# Patient Record
Sex: MALE | Race: WHITE | NOT HISPANIC OR LATINO | URBAN - METROPOLITAN AREA
[De-identification: names, ages, dates, MRNs, and addresses within clinical notes are randomized per-mention and may not be internally consistent; named-entity substitution may affect disease eponyms.]

---

## 2018-03-01 RX ORDER — METOPROLOL SUCCINATE 50 MG/1
50 TABLET, EXTENDED RELEASE ORAL NIGHTLY
COMMUNITY
Start: 2017-12-20

## 2018-03-01 RX ORDER — BUMETANIDE 0.5 MG/1
0.5 TABLET ORAL EVERY MORNING
COMMUNITY
Start: 2017-12-20 | End: 2025-03-11

## 2018-03-01 RX ORDER — LOPERAMIDE HCL 2 MG
2 TABLET ORAL AS NEEDED
COMMUNITY
Start: 2017-05-16 | End: 2019-12-17 | Stop reason: ALTCHOICE

## 2018-03-01 RX ORDER — IPRATROPIUM BROMIDE 21 UG/1
0.03 SPRAY, METERED NASAL AS NEEDED
COMMUNITY
Start: 2014-05-20

## 2018-03-01 RX ORDER — EZETIMIBE 10 MG/1
10 TABLET ORAL DAILY
COMMUNITY
Start: 2017-12-20 | End: 2018-11-26 | Stop reason: SDUPTHER

## 2018-03-01 RX ORDER — METFORMIN HYDROCHLORIDE 500 MG/1
500 TABLET ORAL 2 TIMES DAILY
COMMUNITY
Start: 2017-12-20 | End: 2018-04-16 | Stop reason: SDUPTHER

## 2018-03-01 RX ORDER — SILODOSIN 8 MG/1
8 CAPSULE ORAL NIGHTLY
COMMUNITY
Start: 2017-12-20

## 2018-03-01 RX ORDER — MUPIROCIN 20 MG/G
2 OINTMENT TOPICAL AS NEEDED
COMMUNITY
Start: 2017-03-21 | End: 2019-12-17 | Stop reason: ALTCHOICE

## 2018-03-01 RX ORDER — CHOLECALCIFEROL (VITAMIN D3) 25 MCG
1000 TABLET ORAL DAILY
COMMUNITY
Start: 2017-05-31

## 2018-03-01 RX ORDER — LISINOPRIL 10 MG/1
10 TABLET ORAL DAILY
COMMUNITY
Start: 2017-12-20 | End: 2019-02-11 | Stop reason: SDUPTHER

## 2018-03-01 RX ORDER — POTASSIUM CHLORIDE 750 MG/1
10 CAPSULE, EXTENDED RELEASE ORAL EVERY OTHER DAY
COMMUNITY
Start: 2017-12-20 | End: 2024-04-02 | Stop reason: ALTCHOICE

## 2018-03-20 ENCOUNTER — OFFICE VISIT (OUTPATIENT)
Dept: CARDIOLOGY | Facility: CLINIC | Age: 83
End: 2018-03-20
Attending: INTERNAL MEDICINE
Payer: MEDICARE

## 2018-03-20 VITALS
HEIGHT: 74 IN | DIASTOLIC BLOOD PRESSURE: 66 MMHG | SYSTOLIC BLOOD PRESSURE: 136 MMHG | WEIGHT: 220 LBS | BODY MASS INDEX: 28.23 KG/M2

## 2018-03-20 DIAGNOSIS — I48.19 PERSISTENT ATRIAL FIBRILLATION (CMS/HCC): Primary | ICD-10-CM

## 2018-03-20 DIAGNOSIS — I25.10 CORONARY ARTERY DISEASE INVOLVING NATIVE CORONARY ARTERY WITHOUT ANGINA PECTORIS, UNSPECIFIED WHETHER NATIVE OR TRANSPLANTED HEART: ICD-10-CM

## 2018-03-20 DIAGNOSIS — E78.5 DYSLIPIDEMIA: ICD-10-CM

## 2018-03-20 PROCEDURE — 99214 OFFICE O/P EST MOD 30 MIN: CPT | Performed by: INTERNAL MEDICINE

## 2018-03-20 RX ORDER — TAMSULOSIN HYDROCHLORIDE 0.4 MG/1
0.4 CAPSULE ORAL DAILY
COMMUNITY
End: 2019-07-12

## 2018-03-20 RX ORDER — FERROUS SULFATE 325(65) MG
65 TABLET ORAL EVERY OTHER DAY
COMMUNITY

## 2018-03-20 NOTE — PROGRESS NOTES
I had the privilege of seeing your patient today for his multiple cardio metabolic problems which are reviewed in previous correspondence most specifically my December 20 letter.    Since that visit Mr. Pruitt has done well and expresses no change in his status he become short of breath if he goes too quickly but at rest she is comfortable with no chest pain PND orthopnea dizziness syncope or edema.  His weight has been stable and he has not fallen although he is certainly at risk.  Pacemaker is in place and is being followed.    The past family social history reviewed in the EMR and in previous correspondence.    Laboratory values from December the creatinine is stable at 1.4, potassium 5.0 hemoglobin 12.1.    Lipid profile total cholesterol 183 LDL 91 HDL 42 triglycerides 248    Physical examination elderly gentleman he was in no distress walking carefully with his cane the blood pressure is 136/66 the pulse is regular rate and rhythm at 70 the weight is 220 which is stable eyes are normal no JVD carotid pulses normal upstroke no bruits lungs are clear to auscultation and percussion there is a grade 3 systolic murmur at the base radiating to the neck abdomen nontender extremities show trace edema skin is warm and dry with multiple sun damaged areas.  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

## 2018-03-20 NOTE — LETTER
March 20, 2018     Aguilar Phan [place]  [place] [id number]      Patient: Cain Pruitt   YOB: 1932   Date of Visit: 3/20/2018       Dear Dr. Argueta Recipients:    Thank you for referring Cain Pruitt to me for evaluation. Below are my notes for this consultation.    If you have questions, please do not hesitate to call me. I look forward to following your patient along with you.         Sincerely,        Chandrakant Bahena MD        CC: No Recipients  Chandrakant Bahena MD  3/20/2018  2:49 PM  Sign at close encounter  I had the privilege of seeing your patient today for his multiple cardio metabolic problems which are reviewed in previous correspondence most specifically my December 20 letter.    Since that visit Mr. Pruitt has done well and expresses no change in his status he become short of breath if he goes too quickly but at rest she is comfortable with no chest pain PND orthopnea dizziness syncope or edema.  His weight has been stable and he has not fallen although he is certainly at risk.  Pacemaker is in place and is being followed.    The past family social history reviewed in the EMR and in previous correspondence.    Laboratory values from December the creatinine is stable at 1.4, potassium 5.0 hemoglobin 12.1.    Lipid profile total cholesterol 183 LDL 91 HDL 42 triglycerides 248    Physical examination elderly gentleman he was in no distress walking carefully with his cane the blood pressure is 136/66 the pulse is regular rate and rhythm at 70 the weight is 220 which is stable eyes are normal no JVD carotid pulses normal upstroke no bruits lungs are clear to auscultation and percussion there is a grade 3 systolic murmur at the base radiating to the neck abdomen nontender extremities show trace edema skin is warm and dry with multiple sun damaged areas.  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 Coronary heart disease with previous dyspnea and angina with bypass surgery, Dr. Lei, , four way including two mammaries with a small non-Q wave myocardial infarction, 2011, with GI bleeding and cardiac catheterization showing patent bypasses and stents.  2. Chronic left ventricular systolic dysfunction with septal moderate global hypokinesis.  3. Hypertension.  4. Hyperlipidemia with statin intolerance because of myalgias, on Zetia.  5. Abdominal obesity.  6. Erectile dysfunction with failure to respond to PDE-5 inhibition.  7. Calcific aortic stenosis, echocardiogram, moderate to severe, 2016.  8. Family history of stroke, brother, late onset.  9. New onset of effort angina, 2007, cardiac cath catheterization demonstrating stenosis involving two sites of a vein graft to a small circumflex marginal with patent mammary grafts to LAD and distal right coronary artery with distal coronary artery stenoses, treated with three bare metal stents in vein graft, Dr. Gutierrez.  10. Carotid atherosclerosis, moderate, bilateral, asymptomatic.  11. Permanent atrial fibrillation, onset 2000, with initial cardioversion 2007  with early recurrence with episode of bradycardia during GI bleeding.  12. Chronic anticoagulation management with Eliquis.  13. 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  14. Syncope, 2008, 2011, with bradyarrhythmia and pacemaker placed, Dr. Meyer, September 2013.  15. Possible obstructive sleep apnea with history of snoring and daytime somnolence with reportedly negative workup.  16. Possible claudication but with normal EDEL's.  17. Peripheral neuropathy.  18. Tobacco use history - never.  19. History of orthostatic hypotension.  20. Vitamin-D deficiency.  21. Weakness and collapse secondary to GI bleeding, November 2011, and episode of syncope, November 2016, possibly secondary to dehydration or his aortic stenosis.  22. 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 Sher Denis, complicated by postoperative bleeding, requiring reoperation for control.  Postoperative bronchitis noted, treated with antibiotic.  23. Colonic polyps.  24. History of upper GI bleeding with AV malformations causing melena secondary to severe bleeding, June 2016, requiring GI intervention endoscopically.  25. Edema secondary to chronic diastolic heart failure, worsening in October 2016, hospitalized for decompensated CHF.  26. Osteomyelitis of the right great toe, requiring amputation for non-healing, 2017.  27. BPH symptoms requiring self-catheterization.    Current Medications:  Lisinopril 10 mg daily, Eliquis 5 mg b.i.d., Zetia 10 mg, metformin 500 mg once daily, vitamin-D3 2000 units daily, metoprolol succinate ER 50 mg once daily, Bumex 0.5 mg once daily, Rapaflo 8 mg daily.    History:  Since his last visit of May, Mr. Pruitt states that he is feeling somewhat more short of breath with ordinary activity.  He also has some tightness in his upper chest when he overdoes it.  At rest and with low levels of activity, he is comfortable.  He has complex valvular and  "coronary disease as described above and underwent an echo today.    Current Lab Studies:  The total cholesterol is 187, LDL 96, HDL 44, triglycerides 236.  Creatinine is 1.6, potassium 4.9, fasting sugar 146, A1C 6.8.  Liver function tests are normal.  Ferritin level is low at 28.  Hemoglobin is 12.2 with normal red blood cell indices.    Past, family and social histories are reviewed and updated.    Physical Examination:  Overweight, no distress.  The blood pressure is 130/66.  The pulse is 48 and regular.  The weight is 221, down eight pounds, at 6'2\".  The eyes are normal.  There is no jugular venous distention.  The carotid pulses are sclerotic with a normal upstroke and no bruits.  Lungs are clear.  LV apical impulse is indistinct.  Heart sounds are distant, Grade 3 systolic murmur at the base without radiation. Abdomen is obese, nontender, no masses or organomegaly.  Extremities show trace edema at each ankle.  There is a boot on his right foot.    Echocardiogram was supervised and interpreted with the patient and family.  This demonstrates mild decreased LV systolic function, EF in the 45-50% range, with distal septal hypokinesis.  The left atrium is mildly dilated to 4.8.  There is mild left ventricular hypertrophy, mitral annular calcification.  The aortic valve is calcified and reduced mobility.  The peak systolic gradient is 64 with a mean gradient is 34 and the calculated valve area is 0.7 cm sq.  Moderate aortic regurgitation is also present.    Assessment and Plan:    1. 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    2. Chronic left ventricular systolic and diastolic dysfunction with mildly reduced overall systolic function.    3. Hypertension.  Controlled.    4. Hyperlipidemia.  Relatively well controlled with some difficulty with statin therapy.    5. Calcific aortic stenosis which is now severe and mildly symptomatic.  We have discussed the timing of intervention.  At this point, we have " decided to defer pending worsening symptoms given his other comorbidities.    6. Carotid atherosclerosis which in the past was moderate, bilateral.  Currently asymptomatic.    7. History of atrial fibrillation.    8. Anticoagulation with Eliquis with no bleeding.    9. History of syncope with a pacemaker in place.    10. Possible obstructive sleep apnea.    11. Chronic kidney disease, Stage 3.  Creatinine is stable at 1.6.    12. History of GI bleeding in the past.  Hemoglobin is now normal at 12.2.  Ferritin level is low, suggesting ongoing iron deficiency.    13. Adenocarcinoma of the cecum.  That has been removed and with no recurrence.    14. 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  No recurrence.    15. BPH symptoms, requiring self-catheterization.  Ordinarily, he would be having surgery for this at this point but with his other problems, we have elected to see how Mr. Pruitt does over the next month or two and decide whether intervention on his valve would be wise at this point or not.    cc:

## 2018-03-20 NOTE — PROGRESS NOTES
2017    Re:  STEPHANI CHRISTIANSON  :  1932    1. Coronary heart disease with previous dyspnea and angina with bypass surgery, Dr. Lei, , four way including two mammaries with a small non-Q wave myocardial infarction, 2011, with GI bleeding and cardiac catheterization showing patent bypasses and stents.  2. Chronic left ventricular systolic dysfunction with septal moderate global hypokinesis.  3. Hypertension.  4. Hyperlipidemia with statin intolerance because of myalgias, on Zetia.  5. Abdominal obesity.  6. Erectile dysfunction with failure to respond to PDE-5 inhibition.  7. Calcific aortic stenosis, echocardiogram, moderate to severe, 2016.  8. Family history of stroke, brother, late onset.  9. New onset of effort angina, 2007, cardiac cath catheterization demonstrating stenosis involving two sites of a vein graft to a small circumflex marginal with patent mammary grafts to LAD and distal right coronary artery with distal coronary artery stenoses, treated with three bare metal stents in vein graft, Dr. Gutierrez.  10. Carotid atherosclerosis, moderate, bilateral, asymptomatic.  11. Permanent atrial fibrillation, onset 2000, with initial cardioversion 2007 with early recurrence with episode of bradycardia during GI bleeding.  12. Chronic anticoagulation management with Eliquis.  13. 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  14. Syncope, 2011, with bradyarrhythmia and pacemaker placed, Dr. Meyer, 2013.  15. Possible obstructive sleep apnea with history of snoring and daytime somnolence with reportedly negative workup.  16. Possible claudication but with normal EDEL's.  17. Peripheral neuropathy.  18. Tobacco use history - never.  19. History of orthostatic hypotension.  20. Vitamin-D deficiency.  21. Weakness and collapse secondary to GI bleeding, 2011, and episode of syncope, 2016,  "possibly secondary to dehydration or his aortic stenosis.  22. 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 Sher Denis, complicated by postoperative bleeding, requiring reoperation for control.  Postoperative bronchitis noted, treated with antibiotic.  23. Colonic polyps.  24. History of upper GI bleeding with AV malformations causing melena secondary to severe bleeding, June 2016, requiring GI intervention endoscopically.  25. Edema secondary to chronic diastolic heart failure, worsening in October 2016, hospitalized for decompensated CHF.  26. Osteomyelitis of the right great toe, requiring amputation for non-healing, 2017.  27. BPH symptoms requiring self-catheterization.    Current Medications:  Lisinopril 10 mg daily, Eliquis 5 mg b.i.d., Zetia 10 mg, metformin 500 mg once daily, vitamin-D3 2000 units daily, metoprolol succinate ER 50 mg once daily, Bumex 0.5 mg once daily, Rapaflo 8 mg daily.    History:  Since his last visit of May, Mr. Pruitt states that he is feeling somewhat more short of breath with ordinary activity.  He also has some tightness in his upper chest when he overdoes it.  At rest and with low levels of activity, he is comfortable.  He has complex valvular and coronary disease as described above and underwent an echo today.    Current Lab Studies:  The total cholesterol is 187, LDL 96, HDL 44, triglycerides 236.  Creatinine is 1.6, potassium 4.9, fasting sugar 146, A1C 6.8.  Liver function tests are normal.  Ferritin level is low at 28.  Hemoglobin is 12.2 with normal red blood cell indices.    Past, family and social histories are reviewed and updated.    Physical Examination:  Overweight, no distress.  The blood pressure is 130/66.  The pulse is 48 and regular.  The weight is 221, down eight pounds, at 6'2\".  The eyes are normal.  There is no jugular venous distention.  The carotid pulses are sclerotic with a normal upstroke and no bruits.  Lungs are " clear.  LV apical impulse is indistinct.  Heart sounds are distant, Grade 3 systolic murmur at the base without radiation. Abdomen is obese, nontender, no masses or organomegaly.  Extremities show trace edema at each ankle.  There is a boot on his right foot.    Echocardiogram was supervised and interpreted with the patient and family.  This demonstrates mild decreased LV systolic function, EF in the 45-50% range, with distal septal hypokinesis.  The left atrium is mildly dilated to 4.8.  There is mild left ventricular hypertrophy, mitral annular calcification.  The aortic valve is calcified and reduced mobility.  The peak systolic gradient is 64 with a mean gradient is 34 and the calculated valve area is 0.7 cm sq.  Moderate aortic regurgitation is also present.    Assessment and Plan:    1. 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    2. Chronic left ventricular systolic and diastolic dysfunction with mildly reduced overall systolic function.    3. Hypertension.  Controlled.    4. Hyperlipidemia.  Relatively well controlled with some difficulty with statin therapy.    5. Calcific aortic stenosis which is now severe and mildly symptomatic.  We have discussed the timing of intervention.  At this point, we have decided to defer pending worsening symptoms given his other comorbidities.    6. Carotid atherosclerosis which in the past was moderate, bilateral.  Currently asymptomatic.    7. History of atrial fibrillation.    8. Anticoagulation with Eliquis with no bleeding.    9. History of syncope with a pacemaker in place.    10. Possible obstructive sleep apnea.    11. Chronic kidney disease, Stage 3.  Creatinine is stable at 1.6.    12. History of GI bleeding in the past.  Hemoglobin is now normal at 12.2.  Ferritin level is low, suggesting ongoing iron deficiency.    13. Adenocarcinoma of the cecum.  That has been removed and with no recurrence.    14. 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  No recurrence.    15. BPH symptoms, requiring self-catheterization.  Ordinarily, he would be having surgery for this at this point but with his other problems, we have elected to see how Mr. Pruitt does over the next month or two and decide whether intervention on his valve would be wise at this point or not.    cc:

## 2018-04-13 ENCOUNTER — TELEPHONE (OUTPATIENT)
Dept: CARDIOLOGY | Facility: CLINIC | Age: 83
End: 2018-04-13

## 2018-04-16 RX ORDER — METFORMIN HYDROCHLORIDE 500 MG/1
500 TABLET ORAL 2 TIMES DAILY
Qty: 180 TABLET | Refills: 3 | Status: SHIPPED | OUTPATIENT
Start: 2018-04-16 | End: 2019-10-14

## 2018-06-18 ENCOUNTER — OFFICE VISIT (OUTPATIENT)
Dept: CARDIOLOGY | Facility: CLINIC | Age: 83
End: 2018-06-18
Payer: MEDICARE

## 2018-06-18 VITALS
BODY MASS INDEX: 28.04 KG/M2 | SYSTOLIC BLOOD PRESSURE: 140 MMHG | DIASTOLIC BLOOD PRESSURE: 72 MMHG | WEIGHT: 218.5 LBS | HEIGHT: 74 IN

## 2018-06-18 DIAGNOSIS — I48.21 PERMANENT ATRIAL FIBRILLATION (CMS/HCC): Primary | ICD-10-CM

## 2018-06-18 PROCEDURE — 99214 OFFICE O/P EST MOD 30 MIN: CPT | Performed by: INTERNAL MEDICINE

## 2018-06-18 RX ORDER — DUTASTERIDE 0.5 MG/1
0.5 CAPSULE, LIQUID FILLED ORAL NIGHTLY
Refills: 0 | COMMUNITY
Start: 2018-05-15

## 2018-06-18 NOTE — LETTER
2018     Aguilar Phan MD  [address] [facility] [place] [address]    Patient: Stephani Pruitt   YOB: 1932   Date of Visit: 2018       Dear Dr. Phan:    Thank you for referring Stephani Pruitt to me for evaluation. Below are my notes for this consultation.    If you have questions, please do not hesitate to call me. I look forward to following your patient along with you.         Sincerely,        Chandrakant Bahena MD        CC: No Recipients  Chandrakant Bahena MD  2018  3:08 PM  Sign at close encounter                 Chandrakant Bahena MD  2018  3:18 PM  Sign at close encounter  Patient MRN: [id number]  Date: 2017  Description: Prog Notes ([facility]) Cardiology  Category: Progress Notes ([facility])  Provider ID: [id number]  Practice ID: [id number]  [place] ID: [id number]  2017  Aguilar Phan M.D.  [address] [place] [address]  Re: STEPHNAI PRUITT  : 1932  Dear Dr. Phan:  I saw Mr. Pruitt in follow-up for his chronic medical problems includin. Coronary heart disease with previous dyspnea and angina with bypass surgery, Dr. Lei,  , four way including two mammaries with a small non-Q wave myocardial infarction,  2011, with GI bleeding and cardiac catheterization showing patent bypasses and  stents.  2. Chronic left ventricular systolic dysfunction with septal moderate global hypokinesis.  3. Hypertension.  4. Hyperlipidemia with statin intolerance because of myalgias, on Zetia.  5. Abdominal obesity.  6. Erectile dysfunction with failure to respond to PDE-5 inhibition.  7. Calcific aortic stenosis, echocardiogram, moderate to severe, 2016.  8. Family history of stroke, brother, late onset.  9. New onset of effort angina, 2007, cardiac cath catheterization demonstrating stenosis  involving two sites of a vein graft to a small circumflex marginal with patent mammary grafts to  LAD and distal  right coronary artery with distal coronary artery stenoses, treated with three bare  metal stents in vein graft, Dr. Gutierrez.  10. Carotid atherosclerosis, moderate, bilateral, asymptomatic.  11. Permanent atrial fibrillation, onset October 2000, with initial cardioversion December 2007  with early recurrence with episode of bradycardia during GI bleeding.  12. Chronic anticoagulation management with Eliquis.  13. 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  14. Syncope, 2008, 2011, with bradyarrhythmia and pacemaker placed, Dr. Meyer, September 2013.  15. Possible obstructive sleep apnea with history of snoring and daytime somnolence with  reportedly negative workup.  16. Possible claudication but with normal EDEL's.  17. Peripheral neuropathy.  18. Tobacco use history - never.  19. History of orthostatic hypotension.  20. Vitamin-D deficiency.  21. Weakness and collapse secondary to GI bleeding, November 2011, and episode of syncope,  November 2016, possibly secondary to dehydration or his aortic stenosis.  22. 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 Sher Denis, complicated by postoperative bleeding, requiring reoperation for control. Postoperative  bronchitis noted, treated with antibiotic.  23. Colonic polyps.  24. History of upper GI bleeding with AV malformations causing melena secondary to severe  bleeding, June 2016, requiring GI intervention endoscopically.  25. Edema secondary to chronic diastolic heart failure, worsening in October 2016, hospitalized for  decompensated CHF.  26. Osteomyelitis of the right great toe, requiring amputation for non-healing, 2017.  27. BPH symptoms requiring self-catheterization.    Current Medications: Lisinopril 10 mg daily, Eliquis 5 mg b.i.d., Zetia 10 mg, metformin 500 mg once  daily, vitamin-D3 2000 units daily, metoprolol succinate ER 50 mg once daily, Bumex 0.5 mg once daily,  Rapaflo 8 mg  daily    Since his last visit of March 20 he reports doing well is no longer short of breath not edematous weight is stable no chest pain PND orthopnea palpitations or bleeding.    His multiple medical problems are reviewed above and in the EMR    Physical examination walking with a cane mildly overweight no distress blood pressure 140/70 pulse 70 and regular weight 218 down 1 pound eyes are normal no JVD lungs are clear grade 3 systolic murmur at the base radiating to the neck extremities with stasis changes and a slight shallow ulceration at the right ankle.  No edema.  Balance and gait are supported adequately with a cane    Assessment and plan    1 chronic ischemic heart disease asymptomatic on medical therapy.  Continue    2 chronic left ventricular systolic dysfunction CHF is compensated continue present medication.  Currently he is euvolemic    3 hyperlipidemia difficulty with high intensity statin last LDL 91    4 permanent atrial fibrillation asymptomatic with history of bradycardia arrhythmia and pacemaker in place    5 anticoagulation with Eliquis with good compliance and no bleeding.  I told him there is a reversal agent available and trauma centers if necessary.    6 edema which is resolved there is a very slight skin breakdown at his right ankle and he is treating this with local topical medications and dressings that should be watched.

## 2018-06-18 NOTE — PROGRESS NOTES
Patient MRN: [id number]  Date: 2017  Description: Prog Notes ([facility]) Cardiology  Category: Progress Notes ([facility])  Provider ID: [id number]  Practice ID: [id number]  [place] ID: [id number]  2017  Aguilar Phan M.D.  [address] [place] [address]  Re: STEPHANI PRUITT  : 1932  Dear Dr. Phan:  I saw Mr. Pruitt in follow-up for his chronic medical problems includin. Coronary heart disease with previous dyspnea and angina with bypass surgery, Dr. Lei,  , four way including two mammaries with a small non-Q wave myocardial infarction,  2011, with GI bleeding and cardiac catheterization showing patent bypasses and  stents.  2. Chronic left ventricular systolic dysfunction with septal moderate global hypokinesis.  3. Hypertension.  4. Hyperlipidemia with statin intolerance because of myalgias, on Zetia.  5. Abdominal obesity.  6. Erectile dysfunction with failure to respond to PDE-5 inhibition.  7. Calcific aortic stenosis, echocardiogram, moderate to severe, 2016.  8. Family history of stroke, brother, late onset.  9. New onset of effort angina, 2007, cardiac cath catheterization demonstrating stenosis  involving two sites of a vein graft to a small circumflex marginal with patent mammary grafts to  LAD and distal right coronary artery with distal coronary artery stenoses, treated with three bare  metal stents in vein graft, Dr. Gutierrez.  10. Carotid atherosclerosis, moderate, bilateral, asymptomatic.  11. Permanent atrial fibrillation, onset 2000, with initial cardioversion 2007  with early recurrence with episode of bradycardia during GI bleeding.  12. Chronic anticoagulation management with Eliquis.  13. 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  14. Syncope, , , with bradyarrhythmia and pacemaker placed, Dr. Meyer, 2013.  15. Possible obstructive sleep apnea with history  of snoring and daytime somnolence with  reportedly negative workup.  16. Possible claudication but with normal EDEL's.  17. Peripheral neuropathy.  18. Tobacco use history - never.  19. History of orthostatic hypotension.  20. Vitamin-D deficiency.  21. Weakness and collapse secondary to GI bleeding, November 2011, and episode of syncope,  November 2016, possibly secondary to dehydration or his aortic stenosis.  22. 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 Sher Denis, complicated by postoperative bleeding, requiring reoperation for control. Postoperative  bronchitis noted, treated with antibiotic.  23. Colonic polyps.  24. History of upper GI bleeding with AV malformations causing melena secondary to severe  bleeding, June 2016, requiring GI intervention endoscopically.  25. Edema secondary to chronic diastolic heart failure, worsening in October 2016, hospitalized for  decompensated CHF.  26. Osteomyelitis of the right great toe, requiring amputation for non-healing, 2017.  27. BPH symptoms requiring self-catheterization.    Current Medications: Lisinopril 10 mg daily, Eliquis 5 mg b.i.d., Zetia 10 mg, metformin 500 mg once  daily, vitamin-D3 2000 units daily, metoprolol succinate ER 50 mg once daily, Bumex 0.5 mg once daily,  Rapaflo 8 mg daily    Since his last visit of March 20 he reports doing well is no longer short of breath not edematous weight is stable no chest pain PND orthopnea palpitations or bleeding.    His multiple medical problems are reviewed above and in the EMR    Physical examination walking with a cane mildly overweight no distress blood pressure 140/70 pulse 70 and regular weight 218 down 1 pound eyes are normal no JVD lungs are clear grade 3 systolic murmur at the base radiating to the neck extremities with stasis changes and a slight shallow ulceration at the right ankle.  No edema.  Balance and gait are supported adequately with a cane    Recent  lab studies total cholesterol 186 triglycerides 217 HDL 40  urine AC ratio 92 normal 30.  Hemoglobin 13.3 creatinine 1.3 potassium 4.8 glucose 151 liver function test normal.  A1c 6.3    Assessment and plan    1 chronic ischemic heart disease asymptomatic on medical therapy.  Continue    2 chronic left ventricular systolic dysfunction CHF is compensated continue present medication.  Currently he is euvolemic    3 hyperlipidemia difficulty with high intensity statin.  Lipids are under fair control triglycerides a bit elevated for which hygienic lifestyle continues to be encouraged    4 permanent atrial fibrillation asymptomatic with history of bradycardia arrhythmia and pacemaker in place    5 anticoagulation with Eliquis with good compliance and no bleeding.  I told him there is a reversal agent available and trauma centers if necessary.    6 edema which is resolved there is a very slight skin breakdown at his right ankle and he is treating this with local topical medications and dressings that should be watched.    7 type 2 diabetes A1c relatively well controlled with no episodes of hypoglycemia.

## 2018-11-14 ENCOUNTER — OFFICE VISIT (OUTPATIENT)
Dept: CARDIOLOGY | Facility: CLINIC | Age: 83
End: 2018-11-14
Attending: INTERNAL MEDICINE
Payer: MEDICARE

## 2018-11-14 VITALS
WEIGHT: 225 LBS | DIASTOLIC BLOOD PRESSURE: 66 MMHG | SYSTOLIC BLOOD PRESSURE: 126 MMHG | HEIGHT: 74 IN | BODY MASS INDEX: 28.88 KG/M2

## 2018-11-14 DIAGNOSIS — I25.10 CORONARY ARTERY DISEASE INVOLVING NATIVE CORONARY ARTERY OF NATIVE HEART WITHOUT ANGINA PECTORIS: ICD-10-CM

## 2018-11-14 DIAGNOSIS — I10 ESSENTIAL HYPERTENSION: ICD-10-CM

## 2018-11-14 DIAGNOSIS — I35.0 NONRHEUMATIC AORTIC VALVE STENOSIS: Primary | ICD-10-CM

## 2018-11-14 DIAGNOSIS — I48.20 CHRONIC ATRIAL FIBRILLATION (CMS/HCC): ICD-10-CM

## 2018-11-14 DIAGNOSIS — E78.5 DYSLIPIDEMIA: ICD-10-CM

## 2018-11-14 PROCEDURE — 93000 ELECTROCARDIOGRAM COMPLETE: CPT | Performed by: INTERNAL MEDICINE

## 2018-11-14 PROCEDURE — 99215 OFFICE O/P EST HI 40 MIN: CPT | Performed by: INTERNAL MEDICINE

## 2018-11-14 RX ORDER — LANOLIN ALCOHOL/MO/W.PET/CERES
1000 CREAM (GRAM) TOPICAL DAILY
COMMUNITY

## 2018-11-14 NOTE — PROGRESS NOTES
1. Coronary heart disease with previous dyspnea and angina with bypass surgery, Dr. Lei,  1991, four way including two mammaries with a small non-Q wave myocardial infarction,  November 2011, with GI bleeding and cardiac catheterization showing patent bypasses and  stents.  2. Chronic left ventricular systolic dysfunction with septal moderate global hypokinesis.  3. Hypertension.  4. Hyperlipidemia with statin intolerance because of myalgias, on ezetimibe  5. Abdominal obesity.  6. Erectile dysfunction with failure to respond to PDE-5 inhibition.  7. Calcific aortic stenosis, echocardiogram, moderate to severe, November 2016.  8. Family history of stroke, brother, late onset.  9. New onset of effort angina, October 2007, cardiac cath catheterization demonstrating stenosis  involving two sites of a vein graft to a small circumflex marginal with patent mammary grafts to  LAD and distal right coronary artery with distal coronary artery stenoses, treated with three bare  metal stents in vein graft, Dr. Gutierrez.  10. Carotid atherosclerosis, moderate, bilateral, asymptomatic.  11. Permanent atrial fibrillation, onset October 2000, with initial cardioversion December 2007  with early recurrence with episode of bradycardia during GI bleeding.  12. Chronic anticoagulation management with Eliquis.  13. 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 Syncope, 2008, 2011, with bradyarrhythmia and pacemaker placed, Dr. Meyer, September 2013.  15. Possible obstructive sleep apnea with history of snoring and daytime somnolence with  reportedly negative workup.  16. Possible claudication but with normal EDEL's.  17. Peripheral neuropathy.  With paresthesias  18. Tobacco use history - never.  19. History of orthostatic hypotension.  20. Vitamin-D deficiency.  21. Weakness and collapse secondary to GI bleeding, November 2011, and episode of syncope,  November 2016, possibly secondary to dehydration or his aortic  stenosis.  22. 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 Sher Denis, complicated by postoperative bleeding, requiring reoperation for control. Postoperative  bronchitis noted, treated with antibiotic.  23. Colonic polyps.  24. History of upper GI bleeding with AV malformations causing melena secondary to severe  bleeding, June 2016, requiring GI intervention endoscopically.  25. Edema secondary to chronic diastolic heart failure, worsening in October 2016, hospitalized for  decompensated CHF.  26. Osteomyelitis of the right great toe, requiring amputation for non-healing, 2017.  27. BPH symptoms requiring self-catheterization.     Current Medications: Lisinopril 10 mg daily, Eliquis 5 mg b.i.d., Zetia 10 mg, metformin 500 mg once  daily, vitamin-D3 2000 units daily, metoprolol succinate ER 50 mg once daily, Bumex 0.5 mg once daily,  Rapaflo 8 mg daily    Interim history: Since his last visit of June your patient reports being about the same and states he feels actually fine most days.  Some days he seems to lose power is not able to exercise as well as before other days he can cruise around [employer] without much difficulty.  No angina dizziness syncope.  A pacemaker is in place with normal function.    His medical history is reviewed above.    Physical examination he is normal-appearing walking carefully with a cane blood pressure is 126/66 pulse is 70 and regular the weight is 225 up 7pounds.  The eyes show pink conjunctiva no JVD normal carotid pulses lungs are clear there is a grade 3 systolic murmur at the base.  Abdomen no masses organomegaly extremities no edema skin warm and dry.  Balance and gait are supported appropriately with his cane.    EKG is atrial fibrillation with ventricular pacemaker.    Current labs the total cholesterol is 169 LDL cholesterol 82 HDL 42 triglycerides 224 fasting blood sugar 142 and A1c 7.1.  The remainder of the chemistry profile is  normal.      Assessment    1.  Coronary artery disease asymptomatic path of anatomy treatments outlined above.  Continue present program.    2.  Chronic left ventricular systolic dysfunction with compensated CHF on present therapy.  Continue.    3.  Hypertension controlled continue present therapy.    4.  Hyperlipidemia statin intolerant.  On present program his cholesterol is relatively well controlled.  The triglycerides are modestly elevated and therapeutic lifestyle changes are recommended.    5.  Calcific aortic stenosis moderate to severe by last echocardiogram December 2017.  His symptoms now are fatigue which is multifactorial.  There is no angina no unusual shortness of breath no congestive heart failure no dizziness or syncope.  We will repeat an echocardiogram in 6 months.    7.  Permanent atrial fibrillation asymptomatic with a history of bradycardia and a pacemaker is in place.    8 chronic anticoagulation management with Eliquis with no bleeding and a normal CBC.    The only issue is whether he might be a candidate someday for a transaortic valve replacement.  He would be at higher risk of complications given the above comorbidities at this point I do not think he is symptomatic enough

## 2018-11-14 NOTE — LETTER
November 14, 2018     Aguilar Phan MD  [address] [facility] [place] [address]    Patient: Cain Pruitt   YOB: 1932   Date of Visit: 11/14/2018       Dear Dr. Phan:    Thank you for referring Cain Pruitt to me for evaluation. Below are my notes for this consultation.    If you have questions, please do not hesitate to call me. I look forward to following your patient along with you.         Sincerely,        Chandrakant Bahena MD        CC: No Recipients  Chandrakant Bahena MD  11/14/2018  2:28 PM  Sign at close encounter     1. Coronary heart disease with previous dyspnea and angina with bypass surgery, Dr. Lei,  1991, four way including two mammaries with a small non-Q wave myocardial infarction,  November 2011, with GI bleeding and cardiac catheterization showing patent bypasses and  stents.  2. Chronic left ventricular systolic dysfunction with septal moderate global hypokinesis.  3. Hypertension.  4. Hyperlipidemia with statin intolerance because of myalgias, on ezetimibe  5. Abdominal obesity.  6. Erectile dysfunction with failure to respond to PDE-5 inhibition.  7. Calcific aortic stenosis, echocardiogram, moderate to severe, November 2016.  8. Family history of stroke, brother, late onset.  9. New onset of effort angina, October 2007, cardiac cath catheterization demonstrating stenosis  involving two sites of a vein graft to a small circumflex marginal with patent mammary grafts to  LAD and distal right coronary artery with distal coronary artery stenoses, treated with three bare  metal stents in vein graft, Dr. Gutierrez.  10. Carotid atherosclerosis, moderate, bilateral, asymptomatic.  11. Permanent atrial fibrillation, onset October 2000, with initial cardioversion December 2007  with early recurrence with episode of bradycardia during GI bleeding.  12. Chronic anticoagulation management with Eliquis.  13. 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 Syncope,  2008, 2011, with bradyarrhythmia and pacemaker placed, Dr. Meyer, September 2013.  15. Possible obstructive sleep apnea with history of snoring and daytime somnolence with  reportedly negative workup.  16. Possible claudication but with normal EDEL's.  17. Peripheral neuropathy.  With paresthesias  18. Tobacco use history - never.  19. History of orthostatic hypotension.  20. Vitamin-D deficiency.  21. Weakness and collapse secondary to GI bleeding, November 2011, and episode of syncope,  November 2016, possibly secondary to dehydration or his aortic stenosis.  22. 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 Sher Denis, complicated by postoperative bleeding, requiring reoperation for control. Postoperative  bronchitis noted, treated with antibiotic.  23. Colonic polyps.  24. History of upper GI bleeding with AV malformations causing melena secondary to severe  bleeding, June 2016, requiring GI intervention endoscopically.  25. Edema secondary to chronic diastolic heart failure, worsening in October 2016, hospitalized for  decompensated CHF.  26. Osteomyelitis of the right great toe, requiring amputation for non-healing, 2017.  27. BPH symptoms requiring self-catheterization.     Current Medications: Lisinopril 10 mg daily, Eliquis 5 mg b.i.d., Zetia 10 mg, metformin 500 mg once  daily, vitamin-D3 2000 units daily, metoprolol succinate ER 50 mg once daily, Bumex 0.5 mg once daily,  Rapaflo 8 mg daily    Interim history: Since his last visit of June your patient reports being about the same and states he feels actually fine most days.  Some days he seems to lose power is not able to exercise as well as before other days he can cruise around [employer] without much difficulty.  No angina dizziness syncope.  A pacemaker is in place with normal function.    His medical history is reviewed above.    Physical examination he is normal-appearing walking carefully with a cane blood  pressure is 126/66 pulse is 70 and regular the weight is 225 up 7pounds.  The eyes show pink conjunctiva no JVD normal carotid pulses lungs are clear there is a grade 3 systolic murmur at the base.  Abdomen no masses organomegaly extremities no edema skin warm and dry.  Balance and gait are supported appropriately with his cane.    EKG is atrial fibrillation with ventricular pacemaker.    Current labs the total cholesterol is 169 LDL cholesterol 82 HDL 42 triglycerides 224 fasting blood sugar 142 and A1c 7.1.  The remainder of the chemistry profile is normal.      Assessment    1.  Coronary artery disease asymptomatic path of anatomy treatments outlined above.  Continue present program.    2.  Chronic left ventricular systolic dysfunction with compensated CHF on present therapy.  Continue.    3.  Hypertension controlled continue present therapy.    4.  Hyperlipidemia statin intolerant.  On present program his cholesterol is relatively well controlled.  The triglycerides are modestly elevated and therapeutic lifestyle changes are recommended.    5.  Calcific aortic stenosis moderate to severe by last echocardiogram December 2017.  His symptoms now are fatigue which is multifactorial.  There is no angina no unusual shortness of breath no congestive heart failure no dizziness or syncope.  We will repeat an echocardiogram in 6 months.    7.  Permanent atrial fibrillation asymptomatic with a history of bradycardia and a pacemaker is in place.    8 chronic anticoagulation management with Eliquis with no bleeding and a normal CBC.    The only issue is whether he might be a candidate someday for a transaortic valve replacement.  He would be at higher risk of complications given the above comorbidities at this point I do not think he is symptomatic enough

## 2018-11-26 RX ORDER — EZETIMIBE 10 MG/1
10 TABLET ORAL DAILY
Qty: 90 TABLET | Refills: 3 | Status: SHIPPED | OUTPATIENT
Start: 2018-11-26 | End: 2019-11-26 | Stop reason: SDUPTHER

## 2019-01-22 NOTE — PROGRESS NOTES
Electrophysiology  Outpatient Progress Note       Reason for visit:   Chief Complaint   Patient presents with   • Follow-up       HPI   Cain Pruitt is a 86 y.o. male who is seen today for follow up of chronic permanent atrial fibrillation. The patient's arrhythmia is asymptomatic. The patient is currently on Eliquis 5 mg tablet. The current primary treatment strategy for his arrhythmia is RATE CONTROL. For rate control, he is on the following: metoprolol succinate ER 50 mg table. He is currently not in sinus rhythm.     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   • Colon polyps    • Coronary artery disease    • Decreased cardiac ejection fraction 1/24/2019   • Edema    • Erectile dysfunction    • GI (gastrointestinal bleed)     6/2016   • Heart block 1/24/2019   • History of BPH    • Hyperlipidemia    • Hypertension    • Hypotension    • Lipid disorder    • Osteomyelitis of toe of right foot (CMS/[facility]) ([facility])    • Pacemaker 1/24/2019   • Peripheral neuropathy    • Sleep apnea    • Syncope     2008,2011   • Type 2 diabetes mellitus (CMS/[facility]) ([facility])    • Vitamin D deficiency      Past Surgical History:   Procedure Laterality Date   • AMPUTATION FOOT / TOE Right    • BOWEL RESECTION     • CARDIAC PACEMAKER PLACEMENT      2013   • CARDIAC SURGERY      1991   • CORONARY STENT PLACEMENT      3 stents   • KNEE SURGERY      1951       Social History   Substance Use Topics   • Smoking status: Never Smoker   • Smokeless tobacco: Never Used   • Alcohol use Yes      Comment: occassionally/social     Family History   Problem Relation Age of Onset   • Clotting disorder Mother    • Leukemia Father        ALLERGY:  No known allergies    Current Outpatient Prescriptions   Medication Sig Dispense Refill   • apixaban (ELIQUIS) 5 mg tablet Take 5 mg by mouth 2 (two) times a day.     • bumetanide (BUMEX) 0.5 mg tablet Take 0.5 mg by mouth daily.     •  cholecalciferol, vitamin D3, (cholecalciferol) 1,000 unit tablet Take by mouth daily.     • cyanocobalamin (vitamin B-12) 1,000 mcg tablet Take 1,000 mcg by mouth daily.     • ezetimibe (ZETIA) 10 mg tablet Take 1 tablet (10 mg total) by mouth daily. 90 tablet 3   • ferrous sulfate 325 mg (65 mg iron) tablet Take 65 mg by mouth daily with breakfast.     • LACTOBACILLUS ACIDOPHILUS (PROBIOTIC ORAL) Take by mouth daily.     • lisinopril (PRINIVIL) 10 mg tablet Take 10 mg by mouth daily.     • loperamide (IMODIUM A-D) 2 mg tablet Take 2 mg by mouth as needed.     • metFORMIN (GLUCOPHAGE) 500 mg tablet Take 1 tablet (500 mg total) by mouth 2 (two) times a day. (Patient taking differently: Take 500 mg by mouth daily with breakfast.  ) 180 tablet 3   • metoprolol succinate XL (TOPROL-XL) 50 mg 24 hr tablet Take 50 mg by mouth daily.     • potassium chloride (MICRO-K) 10 mEq CR capsule Take 10 mEq by mouth 2 (two) times a day.     • silodosin (RAPAFLO) 8 mg capsule Take 8 mg by mouth daily.     • tamsulosin (FLOMAX) 0.4 mg capsule Take 0.4 mg by mouth daily.       • dutasteride (AVODART) 0.5 mg capsule   0   • ipratropium (ATROVENT) 0.03 % nasal spray Administer 0.03 % into affected nostril(s). 2 sprays by intranasal route 2-3 times every day in each nostril     • mupirocin (BACTROBAN) 2 % ointment Apply 2 % topically 3 (three) times a day.       No current facility-administered medications for this visit.        Review of Systems   Constitution: Negative for weakness and malaise/fatigue.   HENT: Negative for hearing loss.    Eyes: Negative for visual disturbance.   Cardiovascular: Negative for chest pain, dyspnea on exertion, irregular heartbeat, leg swelling, near-syncope, orthopnea, palpitations, paroxysmal nocturnal dyspnea and syncope.   Respiratory: Negative for cough and shortness of breath.    Hematologic/Lymphatic: Negative for bleeding problem.   Skin: Negative for rash.   Musculoskeletal: Negative for joint pain  and muscle weakness.   Gastrointestinal: Negative for abdominal pain.   Genitourinary: Negative for hematuria.   Neurological: Negative for focal weakness, paresthesias and tremors.   Psychiatric/Behavioral: Negative for altered mental status.       Objective   Vitals:    01/24/19 1351   BP: (!) 154/80   Pulse: 79   Resp: 16   SpO2: 99%     BP Readings from Last 3 Encounters:   01/24/19 (!) 154/80   11/14/18 126/66   06/18/18 140/72       Physical Exam   Constitutional: He is oriented to person, place, and time. He appears well-developed and well-nourished.   HENT:   Head: Normocephalic and atraumatic.   Eyes: Conjunctivae are normal. Pupils are equal, round, and reactive to light.   Neck: Normal range of motion.   Cardiovascular: Normal rate and regular rhythm.    Murmur heard.   Harsh midsystolic murmur is present with a grade of 3/6  at the upper right sternal border radiating to the neck  Pulmonary/Chest: Breath sounds normal. He has no wheezes. He has no rales.   Abdominal: Soft. Bowel sounds are normal. He exhibits no mass. There is no tenderness.   Musculoskeletal: Normal range of motion. He exhibits no edema or deformity.   Neurological: He is alert and oriented to person, place, and time. No cranial nerve deficit.   Skin: Skin is warm and dry. No rash noted.   Psychiatric: He has a normal mood and affect. His behavior is normal.       Lab Results   Component Value Date    WBC 9.51 12/20/2017    HGB 12.1 (L) 12/20/2017     12/20/2017    CHOL 183 12/20/2017    TRIG 248 (H) 12/20/2017    HDL 42 (L) 12/20/2017    ALT 20 12/20/2017    AST 23 12/20/2017     12/20/2017    K 5.0 12/20/2017    CREATININE 1.4 (H) 12/20/2017    TSH 2.00 07/23/2016    INR 1.2 07/25/2016       Cardiovascular Procedures:    CATH LAB:  Cath - 11/1/2007    ECHO/MUGA:  Echo - 7/15/2010  Echo (Scanned Transthoracic Echo. EF 50%.) - 11/1/2016  Echo (scanned Transthoracic Echocardiography Report EF 45 %) - 12/20/2017.Technically  difficult and suboptimal echocardiogram secondary to patient characteristics.Mild concentric left ventricular hypertrophy with normal cavity size and mildly decreased systolic function. Ejection fraction 45%.Unable to evaluate for wall motion abnormalities given difficulty visualizing endocardial borders.Severe aortic stenosis with peak velocity of 4.1 m/s (Peak instantaneous gradient of 66mmHg) and mean gradient of 34 mmHg.Calculated aortic valve area 0.77cm2. Mild aortic insufficiency.Mild mitral regurgitation. Moderately dilated left atrium.Moderately dilated right ventricular size with preserved systolic function. Mild tricuspid regurgitation with estimated right ventricular systolic pressure of 46 mmHg. Compared to the prior study from 11/2/16, left ventricular function has mildly decreased frrom 50% to 45%. Aortic valve peak gradient has increased from 54mmHg to 66mHg and mean gradient has increased from 24mmHg to 34 mmHg.    ELECTROPHYSIOLOGY:  Devices (Single Chamber PPM (Biotronik-Evia SRT), serial # [id number], ventricular lead(9/16/13)biotronik S60) -9/16/2013    STRESS TESTS:  SEH - 5/20/2009    VASCULAR:  Carotid Duplex (scanned Vascular Carotid) - 12/22/2014    CT/MRI:  Abd/Pelvis CT - 2/7/2012    OTHER:  CXR - 12/3/2011  Other (ABDOMINAL AORTIC US) - 1/20/2011  Other (CAROTID US) - 1/20/2011      ECG Electronic ventricular pacemaker   Pacemaker ECG,      Assessment   Problem List Items Addressed This Visit        Circulatory    Atrial fibrillation (CMS/HCC) - Primary     Chronic asymptomatic. Anticoagulated.          Relevant Orders    ECG 12 LEAD-OFFICE PERFORMED (Completed)    Coronary artery disease involving native coronary artery without angina pectoris     Followed by Dr. Bahena.  No anginal type symptoms.         Relevant Orders    ECG 12 LEAD-OFFICE PERFORMED (Completed)    Nonrheumatic aortic valve stenosis     December 20, 2017 Aortic valve peak gradient has increased from 54mmHg to 66mHg and  mean gradient has increased from 24mmHg to 34 mmHg.  He is scheduled for repeat echocardiogram in April.  He will see Dr. Bahena in follow-up for recommendations in regards to TAVR.           Heart block     Chronic atrial fibrillation with heart block.          Decreased cardiac ejection fraction     12/20/2017 ECHO-Technically difficult and suboptimal echocardiogram secondary to patient characteristics.Mild concentric left ventricular hypertrophy with normal cavity size and mildly decreased systolic function. Ejection fraction 45%.Unable to evaluate for wall motion abnormalities given difficulty visualizing endocardial borders.Severe aortic stenosis with peak velocity of 4.1 m/s (Peak instantaneous gradient of 66mmHg) and mean gradient of 34 mmHg.Calculated aortic valve area 0.77cm2. Mild aortic insufficiency.Mild mitral regurgitation. Moderately dilated left atrium.Moderately dilated right ventricular size with preserved systolic function. Mild tricuspid regurgitation with estimated right ventricular systolic pressure of 46 mmHg. Compared to the prior study from 11/2/16, left ventricular function has mildly decreased frrom 50% to 45%.   Repeat echo scheduled for April. We discussed addition of LV lead if needed.             Endocrine/Metabolic    Dyslipidemia     On Zetia.  Lipids are managed by another physician.            Other    Pacemaker     Biotronik E via single-chamber pacemaker implanted September 16, 2013.  Remaining battery longevity is 5 years 11 months.  Parameter summary is VVI-CLS 60 bpm.  R wave is 25.9 mV, threshold 1.3 V at 0.4 ms and lead impedance 546 ohms.  90% ventricular pacing.  The patient complained of a rapid heartbeat 1 day after walking up a flight of steps that registered 150 bpm on his apple watch.  He was able to sit down and rest and this gradually went away.  A small adjustment was made to the detection of arrhythmias in the pacemaker in order to account for this episode.  We  will continue to monitor him closely.                    Stevo Meyer MD  2/3/2019

## 2019-01-24 ENCOUNTER — OFFICE VISIT (OUTPATIENT)
Dept: CARDIOLOGY | Facility: CLINIC | Age: 84
End: 2019-01-24
Attending: INTERNAL MEDICINE
Payer: MEDICARE

## 2019-01-24 VITALS
HEART RATE: 79 BPM | BODY MASS INDEX: 28.83 KG/M2 | HEIGHT: 74 IN | OXYGEN SATURATION: 99 % | DIASTOLIC BLOOD PRESSURE: 80 MMHG | RESPIRATION RATE: 16 BRPM | SYSTOLIC BLOOD PRESSURE: 154 MMHG | WEIGHT: 224.6 LBS

## 2019-01-24 DIAGNOSIS — I25.10 CORONARY ARTERY DISEASE INVOLVING NATIVE CORONARY ARTERY OF NATIVE HEART WITHOUT ANGINA PECTORIS: ICD-10-CM

## 2019-01-24 DIAGNOSIS — E78.5 DYSLIPIDEMIA: ICD-10-CM

## 2019-01-24 DIAGNOSIS — I48.91 ATRIAL FIBRILLATION, UNSPECIFIED TYPE (CMS/HCC): Primary | ICD-10-CM

## 2019-01-24 DIAGNOSIS — I35.0 NONRHEUMATIC AORTIC VALVE STENOSIS: ICD-10-CM

## 2019-01-24 DIAGNOSIS — Z95.0 PACEMAKER: ICD-10-CM

## 2019-01-24 DIAGNOSIS — I45.9 HEART BLOCK: ICD-10-CM

## 2019-01-24 DIAGNOSIS — R93.1 DECREASED CARDIAC EJECTION FRACTION: ICD-10-CM

## 2019-01-24 PROCEDURE — 99214 OFFICE O/P EST MOD 30 MIN: CPT | Performed by: INTERNAL MEDICINE

## 2019-01-24 PROCEDURE — 93000 ELECTROCARDIOGRAM COMPLETE: CPT | Performed by: INTERNAL MEDICINE

## 2019-01-24 ASSESSMENT — PAIN SCALES - GENERAL: PAINLEVEL: 0-NO PAIN

## 2019-01-24 NOTE — ASSESSMENT & PLAN NOTE
Biotronik E via single-chamber pacemaker implanted September 16, 2013.  Remaining battery longevity is 5 years 11 months.  Parameter summary is VVI-CLS 60 bpm.  R wave is 25.9 mV, threshold 1.3 V at 0.4 ms and lead impedance 546 ohms.  90% ventricular pacing.  The patient complained of a rapid heartbeat 1 day after walking up a flight of steps that registered 150 bpm on his apple watch.  He was able to sit down and rest and this gradually went away.  A small adjustment was made to the detection of arrhythmias in the pacemaker in order to account for this episode.  We will continue to monitor him closely.

## 2019-01-24 NOTE — ASSESSMENT & PLAN NOTE
12/20/2017 ECHO-Technically difficult and suboptimal echocardiogram secondary to patient characteristics.Mild concentric left ventricular hypertrophy with normal cavity size and mildly decreased systolic function. Ejection fraction 45%.Unable to evaluate for wall motion abnormalities given difficulty visualizing endocardial borders.Severe aortic stenosis with peak velocity of 4.1 m/s (Peak instantaneous gradient of 66mmHg) and mean gradient of 34 mmHg.Calculated aortic valve area 0.77cm2. Mild aortic insufficiency.Mild mitral regurgitation. Moderately dilated left atrium.Moderately dilated right ventricular size with preserved systolic function. Mild tricuspid regurgitation with estimated right ventricular systolic pressure of 46 mmHg. Compared to the prior study from 11/2/16, left ventricular function has mildly decreased frrom 50% to 45%.   Repeat echo scheduled for April. We discussed addition of LV lead if needed.

## 2019-01-24 NOTE — LETTER
February 3, 2019     Aguilar Phan MD  [address] [facility] [id number]    Patient: Cain Pruitt   YOB: 1932   Date of Visit: 1/24/2019       Dear Dr. Phan:    Thank you for referring Cain Pruitt to me for evaluation. Below are my notes for this consultation.    If you have questions, please do not hesitate to call me. I look forward to following your patient along with you.         Sincerely,        Stevo Meyer MD        CC: No Recipients  Stevo Meyer MD  2/3/2019  8:22 AM  Signed       Electrophysiology  Outpatient Progress Note       Reason for visit:   Chief Complaint   Patient presents with   • Follow-up       HPI   Cain Pruitt is a 86 y.o. male who is seen today for follow up of chronic permanent atrial fibrillation. The patient's arrhythmia is asymptomatic. The patient is currently on Eliquis 5 mg tablet. The current primary treatment strategy for his arrhythmia is RATE CONTROL. For rate control, he is on the following: metoprolol succinate ER 50 mg table. He is currently not in sinus rhythm.     Past Medical History:   Diagnosis Date   • A-fib (CMS/[facility]) ([facility])    • Cecum cancer (CMS/[facility]CC) ([facility])     12/2/2011   • Colon polyps    • Coronary artery disease    • Decreased cardiac ejection fraction 1/24/2019   • Edema    • Erectile dysfunction    • GI (gastrointestinal bleed)     6/2016   • Heart block 1/24/2019   • History of BPH    • Hyperlipidemia    • Hypertension    • Hypotension    • Lipid disorder    • Osteomyelitis of toe of right foot (CMS/HCC) ([facility])    • Pacemaker 1/24/2019   • Peripheral neuropathy    • Sleep apnea    • Syncope     2008,2011   • Type 2 diabetes mellitus (CMS/HCC) ([facility])    • Vitamin D deficiency      Past Surgical History:   Procedure Laterality Date   • AMPUTATION FOOT / TOE Right    • BOWEL RESECTION     • CARDIAC PACEMAKER PLACEMENT      2013   • CARDIAC SURGERY      1991   • CORONARY STENT PLACEMENT      3 stents   • KNEE SURGERY       1951       Social History   Substance Use Topics   • Smoking status: Never Smoker   • Smokeless tobacco: Never Used   • Alcohol use Yes      Comment: occassionally/social     Family History   Problem Relation Age of Onset   • Clotting disorder Mother    • Leukemia Father        ALLERGY:  No known allergies    Current Outpatient Prescriptions   Medication Sig Dispense Refill   • apixaban (ELIQUIS) 5 mg tablet Take 5 mg by mouth 2 (two) times a day.     • bumetanide (BUMEX) 0.5 mg tablet Take 0.5 mg by mouth daily.     • cholecalciferol, vitamin D3, (cholecalciferol) 1,000 unit tablet Take by mouth daily.     • cyanocobalamin (vitamin B-12) 1,000 mcg tablet Take 1,000 mcg by mouth daily.     • ezetimibe (ZETIA) 10 mg tablet Take 1 tablet (10 mg total) by mouth daily. 90 tablet 3   • ferrous sulfate 325 mg (65 mg iron) tablet Take 65 mg by mouth daily with breakfast.     • LACTOBACILLUS ACIDOPHILUS (PROBIOTIC ORAL) Take by mouth daily.     • lisinopril (PRINIVIL) 10 mg tablet Take 10 mg by mouth daily.     • loperamide (IMODIUM A-D) 2 mg tablet Take 2 mg by mouth as needed.     • metFORMIN (GLUCOPHAGE) 500 mg tablet Take 1 tablet (500 mg total) by mouth 2 (two) times a day. (Patient taking differently: Take 500 mg by mouth daily with breakfast.  ) 180 tablet 3   • metoprolol succinate XL (TOPROL-XL) 50 mg 24 hr tablet Take 50 mg by mouth daily.     • potassium chloride (MICRO-K) 10 mEq CR capsule Take 10 mEq by mouth 2 (two) times a day.     • silodosin (RAPAFLO) 8 mg capsule Take 8 mg by mouth daily.     • tamsulosin (FLOMAX) 0.4 mg capsule Take 0.4 mg by mouth daily.       • dutasteride (AVODART) 0.5 mg capsule   0   • ipratropium (ATROVENT) 0.03 % nasal spray Administer 0.03 % into affected nostril(s). 2 sprays by intranasal route 2-3 times every day in each nostril     • mupirocin (BACTROBAN) 2 % ointment Apply 2 % topically 3 (three) times a day.       No current facility-administered medications for this  visit.        Review of Systems   Constitution: Negative for weakness and malaise/fatigue.   HENT: Negative for hearing loss.    Eyes: Negative for visual disturbance.   Cardiovascular: Negative for chest pain, dyspnea on exertion, irregular heartbeat, leg swelling, near-syncope, orthopnea, palpitations, paroxysmal nocturnal dyspnea and syncope.   Respiratory: Negative for cough and shortness of breath.    Hematologic/Lymphatic: Negative for bleeding problem.   Skin: Negative for rash.   Musculoskeletal: Negative for joint pain and muscle weakness.   Gastrointestinal: Negative for abdominal pain.   Genitourinary: Negative for hematuria.   Neurological: Negative for focal weakness, paresthesias and tremors.   Psychiatric/Behavioral: Negative for altered mental status.       Objective   Vitals:    01/24/19 1351   BP: (!) 154/80   Pulse: 79   Resp: 16   SpO2: 99%     BP Readings from Last 3 Encounters:   01/24/19 (!) 154/80   11/14/18 126/66   06/18/18 140/72       Physical Exam   Constitutional: He is oriented to person, place, and time. He appears well-developed and well-nourished.   HENT:   Head: Normocephalic and atraumatic.   Eyes: Conjunctivae are normal. Pupils are equal, round, and reactive to light.   Neck: Normal range of motion.   Cardiovascular: Normal rate and regular rhythm.    Murmur heard.   Harsh midsystolic murmur is present with a grade of 3/6  at the upper right sternal border radiating to the neck  Pulmonary/Chest: Breath sounds normal. He has no wheezes. He has no rales.   Abdominal: Soft. Bowel sounds are normal. He exhibits no mass. There is no tenderness.   Musculoskeletal: Normal range of motion. He exhibits no edema or deformity.   Neurological: He is alert and oriented to person, place, and time. No cranial nerve deficit.   Skin: Skin is warm and dry. No rash noted.   Psychiatric: He has a normal mood and affect. His behavior is normal.       Lab Results   Component Value Date    WBC 9.51  12/20/2017    HGB 12.1 (L) 12/20/2017     12/20/2017    CHOL 183 12/20/2017    TRIG 248 (H) 12/20/2017    HDL 42 (L) 12/20/2017    ALT 20 12/20/2017    AST 23 12/20/2017     12/20/2017    K 5.0 12/20/2017    CREATININE 1.4 (H) 12/20/2017    TSH 2.00 07/23/2016    INR 1.2 07/25/2016       Cardiovascular Procedures:    CATH LAB:  Cath - 11/1/2007    ECHO/MUGA:  Echo - 7/15/2010  Echo (Scanned Transthoracic Echo. EF 50%.) - 11/1/2016  Echo (scanned Transthoracic Echocardiography Report EF 45 %) - 12/20/2017.Technically difficult and suboptimal echocardiogram secondary to patient characteristics.Mild concentric left ventricular hypertrophy with normal cavity size and mildly decreased systolic function. Ejection fraction 45%.Unable to evaluate for wall motion abnormalities given difficulty visualizing endocardial borders.Severe aortic stenosis with peak velocity of 4.1 m/s (Peak instantaneous gradient of 66mmHg) and mean gradient of 34 mmHg.Calculated aortic valve area 0.77cm2. Mild aortic insufficiency.Mild mitral regurgitation. Moderately dilated left atrium.Moderately dilated right ventricular size with preserved systolic function. Mild tricuspid regurgitation with estimated right ventricular systolic pressure of 46 mmHg. Compared to the prior study from 11/2/16, left ventricular function has mildly decreased frrom 50% to 45%. Aortic valve peak gradient has increased from 54mmHg to 66mHg and mean gradient has increased from 24mmHg to 34 mmHg.    ELECTROPHYSIOLOGY:  Devices (Single Chamber PPM (Biotronik-Evia SRT), serial # [id number], ventricular lead(9/16/13)biotronik S60) -9/16/2013    STRESS TESTS:  SEH - 5/20/2009    VASCULAR:  Carotid Duplex (scanned Vascular Carotid) - 12/22/2014    CT/MRI:  Abd/Pelvis CT - 2/7/2012    OTHER:  CXR - 12/3/2011  Other (ABDOMINAL AORTIC US) - 1/20/2011  Other (CAROTID US) - 1/20/2011      ECG Electronic ventricular pacemaker   Pacemaker ECG,      Assessment   Problem  List Items Addressed This Visit        Circulatory    Atrial fibrillation (CMS/HCC) - Primary     Chronic asymptomatic. Anticoagulated.          Relevant Orders    ECG 12 LEAD-OFFICE PERFORMED (Completed)    Coronary artery disease involving native coronary artery without angina pectoris     Followed by Dr. Bahena.  No anginal type symptoms.         Relevant Orders    ECG 12 LEAD-OFFICE PERFORMED (Completed)    Nonrheumatic aortic valve stenosis     December 20, 2017 Aortic valve peak gradient has increased from 54mmHg to 66mHg and mean gradient has increased from 24mmHg to 34 mmHg.  He is scheduled for repeat echocardiogram in April.  He will see Dr. Bahena in follow-up for recommendations in regards to TAVR.           Heart block     Chronic atrial fibrillation with heart block.          Decreased cardiac ejection fraction     12/20/2017 ECHO-Technically difficult and suboptimal echocardiogram secondary to patient characteristics.Mild concentric left ventricular hypertrophy with normal cavity size and mildly decreased systolic function. Ejection fraction 45%.Unable to evaluate for wall motion abnormalities given difficulty visualizing endocardial borders.Severe aortic stenosis with peak velocity of 4.1 m/s (Peak instantaneous gradient of 66mmHg) and mean gradient of 34 mmHg.Calculated aortic valve area 0.77cm2. Mild aortic insufficiency.Mild mitral regurgitation. Moderately dilated left atrium.Moderately dilated right ventricular size with preserved systolic function. Mild tricuspid regurgitation with estimated right ventricular systolic pressure of 46 mmHg. Compared to the prior study from 11/2/16, left ventricular function has mildly decreased frrom 50% to 45%.   Repeat echo scheduled for April. We discussed addition of LV lead if needed.             Endocrine/Metabolic    Dyslipidemia     On Zetia.  Lipids are managed by another physician.            Other    Pacemaker     Biotronik E via single-chamber pacemaker  implanted September 16, 2013.  Remaining battery longevity is 5 years 11 months.  Parameter summary is VVI-CLS 60 bpm.  R wave is 25.9 mV, threshold 1.3 V at 0.4 ms and lead impedance 546 ohms.  90% ventricular pacing.  The patient complained of a rapid heartbeat 1 day after walking up a flight of steps that registered 150 bpm on his apple watch.  He was able to sit down and rest and this gradually went away.  A small adjustment was made to the detection of arrhythmias in the pacemaker in order to account for this episode.  We will continue to monitor him closely.                    Stevo Meyer MD  2/3/2019

## 2019-01-24 NOTE — ASSESSMENT & PLAN NOTE
December 20, 2017 Aortic valve peak gradient has increased from 54mmHg to 66mHg and mean gradient has increased from 24mmHg to 34 mmHg.  He is scheduled for repeat echocardiogram in April.  He will see Dr. Bahena in follow-up for recommendations in regards to TAVR.

## 2019-02-03 ASSESSMENT — ENCOUNTER SYMPTOMS
PARESTHESIAS: 0
DYSPNEA ON EXERTION: 0
WEAKNESS: 0
SHORTNESS OF BREATH: 0
ORTHOPNEA: 0
PALPITATIONS: 0
IRREGULAR HEARTBEAT: 0
ABDOMINAL PAIN: 0
COUGH: 0
ALTERED MENTAL STATUS: 0
HEMATURIA: 0
PND: 0
FOCAL WEAKNESS: 0
SYNCOPE: 0
NEAR-SYNCOPE: 0
TREMORS: 0

## 2019-02-11 RX ORDER — LISINOPRIL 10 MG/1
10 TABLET ORAL DAILY
Qty: 90 TABLET | Refills: 3 | Status: SHIPPED | OUTPATIENT
Start: 2019-02-11 | End: 2020-02-12 | Stop reason: SDUPTHER

## 2019-04-23 NOTE — PROGRESS NOTES
1. Coronary heart disease with previous dyspnea and angina with bypass surgery, Dr. Lei,  1991, four way including two mammaries , small non-Q wave myocardial infarction,  November 2011, with GI bleeding and cardiac catheterization showing patent bypasses and  stents.  2. Chronic left ventricular systolic dysfunction with septal  And moderate global hypokinesis.  3. Hypertension.  4. Hyperlipidemia combined type with statin intolerance because of myalgias  5. Abdominal obesity.  6. Erectile dysfunction  7. Calcific aortic stenosis, echocardiogram severe, November 2016.  Last echo April 2019 aortic valve area estimated 0.6 with pulmonary hypertension approximately 70  8. Family history of stroke, brother, late onset.  9. New onset of effort angina, October 2007, cardiac cath catheterization demonstrating stenosis  involving two sites of a vein graft to a small circumflex marginal with patent mammary grafts to  LAD and distal right coronary artery with distal coronary artery stenoses, treated with three bare  metal stents in vein graft, Dr. Gutierrez.  10. Carotid atherosclerosis, moderate, bilateral  11. Permanent atrial fibrillation, onset October 2000, with initial cardioversion December 2007  with early recurrence with episode of bradycardia during GI bleeding.   12. Chronic anticoagulation management with Eliquis.  13. 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 Syncope, 2008, 2011, with bradyarrhythmia , pacemaker placed, Dr. Meyer 2013.  15. Possible obstructive sleep apnea with history of snoring and daytime somnolence with  reportedly negative workup.  16. Possible claudication but with normal EDEL's.  17. Peripheral neuropathy.  With paresthesias  18. Tobacco use history - never.  19. History of orthostatic hypotension.  20. Vitamin-D deficiency.  21. Weakness and collapse secondary to GI bleeding, November 2011, and episode of syncope,  November 2016, possibly secondary to  dehydration or his aortic stenosis.  22. 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 Sher Denis, complicated by postoperative bleeding, requiring reoperation for control. Postoperative  bronchitis.  23. Colonic polyps.  24. History of upper GI bleeding with AV malformations causing melena secondary to severe  bleeding, June 2016, requiring GI intervention endoscopically.  25. Edema secondary to chronic diastolic heart failure, worsening in October 2016, hospitalized for  decompensated CHF.  26. Osteomyelitis of the right great toe, requiring amputation, 2017.  27. BPH symptoms requiring self-catheterization.  28.  Chronic kidney disease stage III          Interim history since his last visit Mr. Pruitt reports being about the same.  At rest and with slow activity he is getting along fairly well.  When he goes too quickly he is short of breath and is legs tire easily.  He has occasional chest pressure with overactivity.  At rest and with light activity he is asymptomatic.  No PND orthopnea no dizziness syncope.  The edema has been controlled with a diuretic    Mr. Pruitt's multiple medical problems, pertinent past family and social history are reviewed and updated above.  He has an permanent atrial fibrillation the pacemaker is in place.  He has had no bleeding on Eliquis but has had bleeding in the past from AV malformations..  There is been no recurrence of colon cancer now 8 years since resection.    The examination show him to be a bit overweight but otherwise normal appearing blood pressure 140/72 pulse 70 and regular the weight is 226 which is stable.  His eyes show pink conjunctiva but there is no neck vein distention the carotid pulses are low.  Lungs are clear there is a grade 3 systolic murmur at the base radiating to the neck.  Extremities no edema skin is warm and dry normal color.  Mood and medical engagement are appropriate and normal.    An echocardiogram was  performed which shows mild to moderate decreased LV systolic function, ejection fraction approximately 40 to 45% as before.  There is global hypokinesis.  The aortic valve is heavily calcified with restricted leaflet motion.  The peak gradient is approximately 65-70.  And the calculated aortic valve area is reduced estimated 0.6 cm².  The mitral valve shows some calcification of the posterior leaflet.  There is no gradient and there is mild mitral and tricuspid regurgitation.  There is pulmonary hypertension estimated at approximately 70.    Assessment and plan    1.  Coronary artery disease as described above now 27 years since his bypass surgery and 8 years since his stents were placed.  He has mild angina if he does more than light activity which appears to be stable.  For now we will continue the above medical program and consider coronary angiography only if things deteriorate and become refractory to medical therapy.    2.  History of hypertension which is borderline today.  We need to avoid hypotension given his aortic stenosis so that additional medical therapies will not be given at this point.    3.  Chronic left ventricular systolic dysfunction with global hypokinesis which is stable and no periods of cardiac decompensation have occurred on his present medical therapy.  He will continue cautiously with daily Bumex.  Volume status appears to be optimal.  There is however moderate pulmonary hypertension.    4.  Type 2 diabetes on metformin his creatinine is in the range of 1.4 so metformin can be used cautiously.  He tells me his blood sugars have been running in the normal range.    5.  Permanent atrial fibrillation asymptomatic.  Previous bradycardia arrhythmia was treated with a pacemaker as being monitored with satisfactory pacemaker function with Dr. Onel Meyer.    6.  Severe calcific aortic stenosis which is moderately symptomatic.  At this point we have discussed percutaneous intervention.  At  this point at age 87 with diabetes some kidney problem coronary issues and decreased LV systolic function I believe that we should wait at this point.  If he becomes more symptomatic we would considerTAVR..    7.  Anticoagulation management with Eliquis 5 mg twice daily is the proper dose.'s been no clinical bleeding.  He has had upper GI bleeding in the past but not currently.  His last hemoglobin was stable.    At his next visit Mr. Pruitt will meet with TAVR group and make a decision whether now is the right time to proceed or not.  He knows if he becomes more symptomatic we would proceed

## 2019-04-24 ENCOUNTER — OFFICE VISIT (OUTPATIENT)
Dept: CARDIOLOGY | Facility: CLINIC | Age: 84
End: 2019-04-24
Payer: MEDICARE

## 2019-04-24 ENCOUNTER — HOSPITAL ENCOUNTER (OUTPATIENT)
Dept: CARDIOLOGY | Facility: CLINIC | Age: 84
Discharge: HOME | End: 2019-04-24
Attending: INTERNAL MEDICINE
Payer: MEDICARE

## 2019-04-24 VITALS
WEIGHT: 226 LBS | HEIGHT: 74 IN | DIASTOLIC BLOOD PRESSURE: 72 MMHG | SYSTOLIC BLOOD PRESSURE: 140 MMHG | BODY MASS INDEX: 29 KG/M2

## 2019-04-24 VITALS — BODY MASS INDEX: 28.49 KG/M2 | HEIGHT: 74 IN | WEIGHT: 222 LBS

## 2019-04-24 DIAGNOSIS — I48.20 CHRONIC ATRIAL FIBRILLATION (CMS/HCC): ICD-10-CM

## 2019-04-24 DIAGNOSIS — I35.0 NONRHEUMATIC AORTIC VALVE STENOSIS: ICD-10-CM

## 2019-04-24 DIAGNOSIS — R93.1 DECREASED CARDIAC EJECTION FRACTION: ICD-10-CM

## 2019-04-24 DIAGNOSIS — I25.10 CORONARY ARTERY DISEASE INVOLVING NATIVE CORONARY ARTERY OF NATIVE HEART WITHOUT ANGINA PECTORIS: Primary | ICD-10-CM

## 2019-04-24 PROCEDURE — 93306 TTE W/DOPPLER COMPLETE: CPT | Mod: 26 | Performed by: INTERNAL MEDICINE

## 2019-04-24 PROCEDURE — 93306 TTE W/DOPPLER COMPLETE: CPT

## 2019-04-24 PROCEDURE — 99214 OFFICE O/P EST MOD 30 MIN: CPT | Performed by: INTERNAL MEDICINE

## 2019-04-24 NOTE — LETTER
April 24, 2019     Aguilar Phan MD  [address]  [facility] [address]    Patient: Cain Pruitt   YOB: 1932   Date of Visit: 4/24/2019       Dear Dr. Phan:    Thank you for referring Cain Pruitt to me for evaluation. Below are my notes for this consultation.    If you have questions, please do not hesitate to call me. I look forward to following your patient along with you.         Sincerely,        Chandrakant Bahena MD        CC: No Recipients  Chandrakant Bahena MD  4/24/2019  2:41 PM  Sign at close encounter         1. Coronary heart disease with previous dyspnea and angina with bypass surgery, Dr. Lei,  1991, four way including two mammaries , small non-Q wave myocardial infarction,  November 2011, with GI bleeding and cardiac catheterization showing patent bypasses and  stents.  2. Chronic left ventricular systolic dysfunction with septal  And moderate global hypokinesis.  3. Hypertension.  4. Hyperlipidemia combined type with statin intolerance because of myalgias  5. Abdominal obesity.  6. Erectile dysfunction  7. Calcific aortic stenosis, echocardiogram severe, November 2016.  Last echo April 2019 aortic valve area estimated 0.6 with pulmonary hypertension approximately 70  8. Family history of stroke, brother, late onset.  9. New onset of effort angina, October 2007, cardiac cath catheterization demonstrating stenosis  involving two sites of a vein graft to a small circumflex marginal with patent mammary grafts to  LAD and distal right coronary artery with distal coronary artery stenoses, treated with three bare  metal stents in vein graft, Dr. Gutierrez.  10. Carotid atherosclerosis, moderate, bilateral  11. Permanent atrial fibrillation, onset October 2000, with initial cardioversion December 2007  with early recurrence with episode of bradycardia during GI bleeding.   12. Chronic anticoagulation management with Eliquis.  13. Type 2 diabetes with metabolic syndrome and  obesity,  14. Syncope, 2008, 2011, with bradyarrhythmia , pacemaker placed, Dr. Meyer 2013.  15. Possible obstructive sleep apnea with history of snoring and daytime somnolence with  reportedly negative workup.  16. Possible claudication but with normal EDEL's.  17. Peripheral neuropathy.  With paresthesias  18. Tobacco use history - never.  19. History of orthostatic hypotension.  20. Vitamin-D deficiency.  21. Weakness and collapse secondary to GI bleeding, November 2011, and episode of syncope,  November 2016, possibly secondary to dehydration or his aortic stenosis.  22. 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 Sher Denis, complicated by postoperative bleeding, requiring reoperation for control. Postoperative  bronchitis.  23. Colonic polyps.  24. History of upper GI bleeding with AV malformations causing melena secondary to severe  bleeding, June 2016, requiring GI intervention endoscopically.  25. Edema secondary to chronic diastolic heart failure, worsening in October 2016, hospitalized for  decompensated CHF.  26. Osteomyelitis of the right great toe, requiring amputation, 2017.  27. BPH symptoms requiring self-catheterization.  28.  Chronic kidney disease stage III          Interim history since his last visit Mr. Pruitt reports being about the same.  At rest and with slow activity he is getting along fairly well.  When he goes too quickly he is short of breath and is legs tire easily.  He has occasional chest pressure with overactivity.  At rest and with light activity he is asymptomatic.  No PND orthopnea no dizziness syncope.  The edema has been controlled with a diuretic    Mr. Pruitt's multiple medical problems, pertinent past family and social history are reviewed and updated above.  He has an permanent atrial fibrillation the pacemaker is in place.  He has had no bleeding on Eliquis but has had bleeding in the past from AV malformations..  There is been no  recurrence of colon cancer now 8 years since resection.    The examination show him to be a bit overweight but otherwise normal appearing blood pressure 140/72 pulse 70 and regular the weight is 226 which is stable.  His eyes show pink conjunctiva but there is no neck vein distention the carotid pulses are low.  Lungs are clear there is a grade 3 systolic murmur at the base radiating to the neck.  Extremities no edema skin is warm and dry normal color.  Mood and medical engagement are appropriate and normal.    An echocardiogram was performed which shows mild to moderate decreased LV systolic function, ejection fraction approximately 40 to 45% as before.  There is global hypokinesis.  The aortic valve is heavily calcified with restricted leaflet motion.  The peak gradient is approximately 65-70.  And the calculated aortic valve area is reduced estimated 0.6 cm².  The mitral valve shows some calcification of the posterior leaflet.  There is no gradient and there is mild mitral and tricuspid regurgitation.  There is pulmonary hypertension estimated at approximately 70.    Assessment and plan    1.  Coronary artery disease as described above now 27 years since his bypass surgery and 8 years since his stents were placed.  He has mild angina if he does more than light activity which appears to be stable.  For now we will continue the above medical program and consider coronary angiography only if things deteriorate and become refractory to medical therapy.    2.  History of hypertension which is borderline today.  We need to avoid hypotension given his aortic stenosis so that additional medical therapies will not be given at this point.    3.  Chronic left ventricular systolic dysfunction with global hypokinesis which is stable and no periods of cardiac decompensation have occurred on his present medical therapy.  He will continue cautiously with daily Bumex.  Volume status appears to be optimal.  There is however  moderate pulmonary hypertension.    4.  Type 2 diabetes on metformin his creatinine is in the range of 1.4 so metformin can be used cautiously.  He tells me his blood sugars have been running in the normal range.    5.  Permanent atrial fibrillation asymptomatic.  Previous bradycardia arrhythmia was treated with a pacemaker as being monitored with satisfactory pacemaker function with Dr. Onel Meyer.    6.  Severe calcific aortic stenosis which is moderately symptomatic.  At this point we have discussed percutaneous intervention.  At this point at age 87 with diabetes some kidney problem coronary issues and decreased LV systolic function I believe that we should wait at this point.  If he becomes more symptomatic we would considerTAVR..    7.  Anticoagulation management with Eliquis 5 mg twice daily is the proper dose.'s been no clinical bleeding.  He has had upper GI bleeding in the past but not currently.  His last hemoglobin was stable.    At his next visit Mr. Pruitt will meet with TAVR group and make a decision whether now is the right time to proceed or not.  He knows if he becomes more symptomatic we would proceed

## 2019-04-26 LAB
AORTIC ROOT ANNULUS - M-MODE: 4.1 CM
AORTIC VALVE MEAN VELOCITY: 2.71 M/S
AORTIC VALVE VELOCITY TIME INTEGRAL: 94.7 CM
AV MEAN GRADIENT: 40 MMHG
AV PEAK GRADIENT: 72 MMHG
AV PEAK VELOCITY-S: 4.23 M/S
AV REG PEAK VEL: 3.78 M/S
AV REGURGITATION PRESSURE HALF TIME: 795 MS
AV VALVE AREA: 0.73 CM2
BSA FOR ECHO PROCEDURE: 2.29 M2
DOP CALC LVOT STROKE VOLUME: 69.39 ML
EDV (MM-TEICH): 177 CM3
EF (MM-TEICH): 73 %
EST RIGHT VENT SYSTOLIC PRESSURE BY TRICUSPID REGURGITATION JET: 63 MMHG
ESV (MM-TEICH): 47.8 CM3
LA/AORTA RATIO: 1.2
LAD 2D - M-MODE: 4.9 CM
LVOT 2D: 2 CM
LVOT A: 3.14 CM2
LVOT MG: 2 MMHG
LVOT MV: 0.62 M/S
LVOT PEAK VELOCITY: 0.91 M/S
LVOT PG: 3 MMHG
LVOT VTI: 22.1 CM
M MODE - INTERVENTRICULAR SEPTUM IN END DIASTOLE: 1.38 CM
M MODE - LEFT INTERNAL DIMENSION IN SYSTOLE: 3.41 CM
M MODE - LEFT VENTRICULAR INTERNAL DIMENSION IN DIASTOLE: 5.95 CM
M MODE - LEFT VENTRICULAR POSTERIOR WALL IN END DIASTOLE: 1.47 CM
MV PEAK E VEL: 1.18 M/S
TR MAX PG: 53 MMHG
TRICUSPID VALVE PEAK REGURGITATION VELOCITY: 3.65 M/S

## 2019-07-12 ENCOUNTER — OFFICE VISIT (OUTPATIENT)
Dept: CARDIOLOGY | Facility: CLINIC | Age: 84
End: 2019-07-12
Payer: MEDICARE

## 2019-07-12 VITALS
BODY MASS INDEX: 28.36 KG/M2 | HEART RATE: 68 BPM | DIASTOLIC BLOOD PRESSURE: 70 MMHG | HEIGHT: 74 IN | OXYGEN SATURATION: 98 % | WEIGHT: 221 LBS | SYSTOLIC BLOOD PRESSURE: 140 MMHG

## 2019-07-12 DIAGNOSIS — E78.5 DYSLIPIDEMIA: ICD-10-CM

## 2019-07-12 DIAGNOSIS — I25.10 CORONARY ARTERY DISEASE INVOLVING NATIVE CORONARY ARTERY OF NATIVE HEART WITHOUT ANGINA PECTORIS: ICD-10-CM

## 2019-07-12 DIAGNOSIS — I48.20 CHRONIC ATRIAL FIBRILLATION (CMS/HCC): ICD-10-CM

## 2019-07-12 DIAGNOSIS — I35.0 NONRHEUMATIC AORTIC VALVE STENOSIS: Primary | ICD-10-CM

## 2019-07-12 PROCEDURE — 99205 OFFICE O/P NEW HI 60 MIN: CPT | Performed by: INTERNAL MEDICINE

## 2019-07-12 ASSESSMENT — ENCOUNTER SYMPTOMS
ABDOMINAL PAIN: 0
SYNCOPE: 0
HEMATOLOGIC/LYMPHATIC NEGATIVE: 1
DYSPNEA ON EXERTION: 1
IRREGULAR HEARTBEAT: 1
DYSURIA: 0
DIARRHEA: 0
BACK PAIN: 1
PSYCHIATRIC NEGATIVE: 1
NEAR-SYNCOPE: 0
ENDOCRINE NEGATIVE: 1
HEADACHES: 0
WEIGHT GAIN: 0
ALLERGIC/IMMUNOLOGIC NEGATIVE: 1
DOUBLE VISION: 0
DECREASED APPETITE: 0
BLURRED VISION: 0
FLANK PAIN: 0
DIZZINESS: 0
CONSTIPATION: 0
POLYDIPSIA: 0
SHORTNESS OF BREATH: 1

## 2019-07-12 NOTE — PROGRESS NOTES
Cardiology Consult     Reason for visit: Consultation for aortic valve stenosis transcatheter therapy.    Chief Complaint   Patient presents with   • Atrial Fibrillation   • Hypertension   • Congestive Heart Failure   Ischemic cardiomyopathy  Status post coronary bypass graft surgery  Status post percutaneous coronary intervention  Status post permanent pacemaker implantation  Critical calcific aortic stenosis  Peripheral vascular disease status post lower extremity digit amputation.     QUENTIN Pruitt is a 87 y.o. male  with a history of coronary heart disease status post percutaneous coronary intervention and bypass graft surgery.  He has a long history of a cardiac murmur of known aortic stenosis, followed by clinical exam and echocardiography.  In addition he has a history of diminished left ventricular function, mild, with chronic congestive heart failure.    Recent echocardiographic assessment in April revealed ejection fraction of 40 to 45% with an aortic valve velocity exceeding 4 m/s.  Clinically, he has experienced a decline in his exercise tolerance with exertional dyspnea and fatigue.  He has not had symptoms of syncope, angina or paroxysmal nocturnal dyspnea or orthopnea.    He denies neurologic symptoms.  He has had no fever or chills.  Appetite is good weight stable.  Mild chronic low-grade ankle edema.  Bowel habits regular.  No recent changes in medications.  No recent hospitalizations.    Childhood history noncontributory.  Family history noncontributory.    All medications and allergies were reviewed in detail.    Full medical history reviewed in detail.  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   • Colon polyps    • Coronary artery disease    • Decreased cardiac ejection fraction 1/24/2019   • Edema    • Erectile dysfunction    • GI (gastrointestinal bleed)     6/2016   • Heart block 1/24/2019   • History of BPH     • Hyperlipidemia    • Hypertension    • Hypotension    • Lipid disorder    • Osteomyelitis of toe of right foot (CMS/HCC) ([facility])    • Pacemaker 1/24/2019   • Peripheral neuropathy    • Sleep apnea    • Syncope     2008,2011   • Type 2 diabetes mellitus (CMS/HCC) ([facility])    • Vitamin D deficiency      Past Surgical History:   Procedure Laterality Date   • AMPUTATION FOOT / TOE Right    • BOWEL RESECTION     • CARDIAC PACEMAKER PLACEMENT      2013   • CARDIAC SURGERY      1991   • CORONARY STENT PLACEMENT      3 stents   • KNEE SURGERY      1951     No known allergies  Current Outpatient Prescriptions   Medication Sig Dispense Refill   • apixaban (ELIQUIS) 5 mg tablet Take 5 mg by mouth 2 (two) times a day.     • bumetanide (BUMEX) 0.5 mg tablet Take 0.5 mg by mouth daily.     • cholecalciferol, vitamin D3, (cholecalciferol) 1,000 unit tablet Take by mouth daily.     • cyanocobalamin (vitamin B-12) 1,000 mcg tablet Take 1,000 mcg by mouth daily.     • dutasteride (AVODART) 0.5 mg capsule   0   • ezetimibe (ZETIA) 10 mg tablet Take 1 tablet (10 mg total) by mouth daily. 90 tablet 3   • ferrous sulfate 325 mg (65 mg iron) tablet Take 65 mg by mouth daily with breakfast.     • ipratropium (ATROVENT) 0.03 % nasal spray Administer 0.03 % into affected nostril(s). 2 sprays by intranasal route 2-3 times every day in each nostril     • LACTOBACILLUS ACIDOPHILUS (PROBIOTIC ORAL) Take by mouth daily.     • lisinopril (PRINIVIL) 10 mg tablet Take 1 tablet (10 mg total) by mouth daily. 90 tablet 3   • loperamide (IMODIUM A-D) 2 mg tablet Take 2 mg by mouth as needed.     • metFORMIN (GLUCOPHAGE) 500 mg tablet Take 1 tablet (500 mg total) by mouth 2 (two) times a day. (Patient taking differently: Take 500 mg by mouth daily with breakfast.  ) 180 tablet 3   • metoprolol succinate XL (TOPROL-XL) 50 mg 24 hr tablet Take 50 mg by mouth daily.     • mupirocin (BACTROBAN) 2 % ointment Apply 2 % topically 3 (three) times a day.     •  potassium chloride (MICRO-K) 10 mEq CR capsule Take 10 mEq by mouth 2 (two) times a day.     • silodosin (RAPAFLO) 8 mg capsule Take 8 mg by mouth daily.       No current facility-administered medications for this visit.      Social History     Social History   • Marital status:      Spouse name: N/A   • Number of children: N/A   • Years of education: N/A     Social History Main Topics   • Smoking status: Never Smoker   • Smokeless tobacco: Never Used   • Alcohol use Yes      Comment: occassionally/social   • Drug use: Unknown   • Sexual activity: Not Asked     Other Topics Concern   • None     Social History Narrative   • None     Family History   Problem Relation Age of Onset   • Clotting disorder Mother    • Leukemia Father         Review of Systems   Constitution: Negative for decreased appetite and weight gain.   Eyes: Negative for blurred vision and double vision.   Cardiovascular: Positive for dyspnea on exertion, irregular heartbeat and leg swelling. Negative for chest pain, near-syncope and syncope.   Respiratory: Positive for shortness of breath.    Endocrine: Negative.  Negative for polydipsia.   Hematologic/Lymphatic: Negative.    Skin: Negative for flushing and itching.   Musculoskeletal: Positive for back pain and muscle weakness.   Gastrointestinal: Negative for abdominal pain, constipation and diarrhea.   Genitourinary: Negative for dysuria and flank pain.   Neurological: Negative for dizziness and headaches.   Psychiatric/Behavioral: Negative.    Allergic/Immunologic: Negative.    All other systems reviewed and are negative.     Objective   Vitals:    07/12/19 1200   BP: 140/70   Pulse: 68   SpO2: 98%        Physical Exam   Constitutional: He is oriented to person, place, and time. He appears well-nourished. No distress.   Utilizes cane for stability of gait   HENT:   Head: Atraumatic.   Eyes: Conjunctivae are normal. No scleral icterus.   Neck: No JVD present.   Cardiovascular: Normal rate.     Murmur (Harsh high-pitched systolic murmur lower sternal border in the right to the clavicle.) heard.  Irregular rhythm   Pulmonary/Chest: Effort normal and breath sounds normal. He has no wheezes.   Abdominal: There is no tenderness. There is no rebound.   Musculoskeletal: He exhibits edema. He exhibits no tenderness.   Neurological: He is oriented to person, place, and time.   Skin: Skin is dry. No rash noted.   Psychiatric: He has a normal mood and affect. His behavior is normal. Judgment and thought content normal.   Nursing note and vitals reviewed.      Labs   Lab Results   Component Value Date    WBC 9.51 12/20/2017    HGB 12.1 (L) 12/20/2017    HCT 37.7 (L) 12/20/2017     12/20/2017    CHOL 183 12/20/2017    TRIG 248 (H) 12/20/2017    HDL 42 (L) 12/20/2017    ALT 20 12/20/2017    AST 23 12/20/2017     12/20/2017    K 5.0 12/20/2017     12/20/2017    CREATININE 1.4 (H) 12/20/2017    BUN 24 (H) 12/20/2017    CO2 24 12/20/2017    TSH 2.00 07/23/2016    INR 1.2 07/25/2016     Imaging: Echocardiogram of April 24, 2019  Interpretation Summary     · Critical calcific aortic valve stenosis, aortic valve area 0.66 cm².  · Mildly dilated left ventricular cavity. Moderate concentric left ventricular hypertrophy. Mildly decreased systolic function. Estimated EF = 40- 45%. Diffuse hypokinesis.  · Normal-sized right ventricular cavity with preserved systolic function.  · Mildly dilated right atrium.  · Moderately dilated left atrium.  · Severe mitral valve calcification of the posterior leaflet. Sclerotic mitral valve. Mild mitral valve regurgitation.  · Mild tricuspid valve regurgitation with RVSP of 63 mmHg.  · Aortic root calcificied. The sinuses of Valsalva calcified. Effacement of the sinotubular junction.  · IVC not well visualized.  · Normal pericardium. No evidence of pericardial effusion.      ECG   Electronic ventricular pacemaker        Assessment plan:    1.  Critical calcific aortic  stenosis with progressive symptoms of systolic and diastolic congestive heart failure.  I had an extended in-depth discussion lasting 60 minutes with the patient and family.  The etiology of aortic valve stenosis, the associated symptoms of concern and management of the condition were discussed in complete detail.  We discussed options of medical therapy, surgical aortic valve replacement and transcatheter therapy.  Risk and benefits pros and cons of all approaches were discussed in detail.  We discussed the process of evaluation and all of the testing required to properly  plan a valve implant.  I discussed the 2 commercially available devices and how they differ in their indications.  The risk of stroke the possible indication for cerebral protection device was reviewed.  All the patient's questions and those of his son were answered in detail.  He will consider all of his options and if he wishes to proceed with transcatheter therapy, which I think is an appropriate therapy and indicated in his circumstance based on his symptoms, exam and echo, we will initiate that process.    2.  Ischemic cardiomyopathy.  Status post percutaneous coronary intervention as well as coronary bypass graft surgery.  I discussed the concept of diminished LV function with patient and family.  My suspicion that his a depressed ejection fraction is a combination of ischemic heart disease as well as his valvular condition.  I reviewed with him all the symptoms to be mindful of both from a valvular and coronary perspective and to bring promptly to attention of physicians should they occur.    3.  Sick sinus syndrome.  History of paroxysmal atrial fibrillation status post pacemaker therapy.  Remains anticoagulated.  By history he has a Biotronix pacemaking device in place.  We will coordinate with his electrophysiologist management of the pacemaker at the time of his valve implantation.    4.  Systemic hypertension.  Blood pressure  adequately controlled at this time.    5.  History of colon resection for cecal cancer..  Additional history of colonic polyposis which is followed clinically.    6.  Diabetes mellitus.  Managed by his family physician.    7.  Peripheral vascular disease status post lower extremity digit amputation.    I indicated to patient and family that it is my impression that mechanical intervention into his valvular heart disease with transcatheter therapy is not indicated and potentially very safe approach to treatment of his current complaints referable to his valve stenosis.  I indicated to them that this is predominantly therapy aimed at quality of life improvement rather than any substantial prolongation of life or longevity.  Nonetheless it is my opinion that he is a good candidate for transcatheter aortic valve therapy and likely would be significantly benefited in terms of improved exercise tolerance and diminished risk for cardiac decompensation in the future.    He wishes to further discuss this option with additional members of the family.  He will contact me if he wishes to proceed.  For now, no changes were made medications and no further testing advised.      He and family were encouraged to call with any additional questions.     Efe Mejia MD  7/12/2019

## 2019-07-12 NOTE — LETTER
July 12, 2019     Chandrakant Bahena MD  [address]  [place] [id number]    Patient: Cain Pruitt  YOB: 1932  Date of Visit: 7/12/2019      Dear Dr. Bahena:    Thank you for referring Cani Pruitt to me for evaluation. Below are my notes for this consultation.    If you have questions, please do not hesitate to call me. I look forward to following your patient along with you.         Sincerely,        Efe Mejia MD        CC: MD Aguilar Kimble MD Coady, Paul M, MD  7/12/2019  1:03 PM  Sign at close encounter      Cardiology Consult     Reason for visit: Consultation for aortic valve stenosis transcatheter therapy.    Chief Complaint   Patient presents with   • Atrial Fibrillation   • Hypertension   • Congestive Heart Failure   Ischemic cardiomyopathy  Status post coronary bypass graft surgery  Status post percutaneous coronary intervention  Status post permanent pacemaker implantation  Critical calcific aortic stenosis  Peripheral vascular disease status post lower extremity digit amputation.     HPI     Cain Pruitt is a 87 y.o. male  with a history of coronary heart disease status post percutaneous coronary intervention and bypass graft surgery.  He has a long history of a cardiac murmur of known aortic stenosis, followed by clinical exam and echocardiography.  In addition he has a history of diminished left ventricular function, mild, with chronic congestive heart failure.    Recent echocardiographic assessment in April revealed ejection fraction of 40 to 45% with an aortic valve velocity exceeding 4 m/s.  Clinically, he has experienced a decline in his exercise tolerance with exertional dyspnea and fatigue.  He has not had symptoms of syncope, angina or paroxysmal nocturnal dyspnea or orthopnea.    He denies neurologic symptoms.  He has had no fever or chills.  Appetite is good weight stable.  Mild chronic low-grade ankle edema.  Bowel  habits regular.  No recent changes in medications.  No recent hospitalizations.    Childhood history noncontributory.  Family history noncontributory.    All medications and allergies were reviewed in detail.    Full medical history reviewed in detail.  Past Medical History:   Diagnosis Date   • A-fib (CMS/HCC) ([facility])    • Cecum cancer (CMS/[facility]) ([facility])     12/2/2011   • Colon polyps    • Coronary artery disease    • Decreased cardiac ejection fraction 1/24/2019   • Edema    • Erectile dysfunction    • GI (gastrointestinal bleed)     6/2016   • Heart block 1/24/2019   • History of BPH    • Hyperlipidemia    • Hypertension    • Hypotension    • Lipid disorder    • Osteomyelitis of toe of right foot (CMS/[facility]) ([facility])    • Pacemaker 1/24/2019   • Peripheral neuropathy    • Sleep apnea    • Syncope     2008,2011   • Type 2 diabetes mellitus (CMS/[facility]) ([facility])    • Vitamin D deficiency      Past Surgical History:   Procedure Laterality Date   • AMPUTATION FOOT / TOE Right    • BOWEL RESECTION     • CARDIAC PACEMAKER PLACEMENT      2013   • CARDIAC SURGERY      1991   • CORONARY STENT PLACEMENT      3 stents   • KNEE SURGERY      1951     No known allergies  Current Outpatient Prescriptions   Medication Sig Dispense Refill   • apixaban (ELIQUIS) 5 mg tablet Take 5 mg by mouth 2 (two) times a day.     • bumetanide (BUMEX) 0.5 mg tablet Take 0.5 mg by mouth daily.     • cholecalciferol, vitamin D3, (cholecalciferol) 1,000 unit tablet Take by mouth daily.     • cyanocobalamin (vitamin B-12) 1,000 mcg tablet Take 1,000 mcg by mouth daily.     • dutasteride (AVODART) 0.5 mg capsule   0   • ezetimibe (ZETIA) 10 mg tablet Take 1 tablet (10 mg total) by mouth daily. 90 tablet 3   • ferrous sulfate 325 mg (65 mg iron) tablet Take 65 mg by mouth daily with breakfast.     • ipratropium (ATROVENT) 0.03 % nasal spray Administer 0.03 % into affected nostril(s). 2 sprays by intranasal route 2-3 times every day in each nostril     •  LACTOBACILLUS ACIDOPHILUS (PROBIOTIC ORAL) Take by mouth daily.     • lisinopril (PRINIVIL) 10 mg tablet Take 1 tablet (10 mg total) by mouth daily. 90 tablet 3   • loperamide (IMODIUM A-D) 2 mg tablet Take 2 mg by mouth as needed.     • metFORMIN (GLUCOPHAGE) 500 mg tablet Take 1 tablet (500 mg total) by mouth 2 (two) times a day. (Patient taking differently: Take 500 mg by mouth daily with breakfast.  ) 180 tablet 3   • metoprolol succinate XL (TOPROL-XL) 50 mg 24 hr tablet Take 50 mg by mouth daily.     • mupirocin (BACTROBAN) 2 % ointment Apply 2 % topically 3 (three) times a day.     • potassium chloride (MICRO-K) 10 mEq CR capsule Take 10 mEq by mouth 2 (two) times a day.     • silodosin (RAPAFLO) 8 mg capsule Take 8 mg by mouth daily.       No current facility-administered medications for this visit.      Social History     Social History   • Marital status:      Spouse name: N/A   • Number of children: N/A   • Years of education: N/A     Social History Main Topics   • Smoking status: Never Smoker   • Smokeless tobacco: Never Used   • Alcohol use Yes      Comment: occassionally/social   • Drug use: Unknown   • Sexual activity: Not Asked     Other Topics Concern   • None     Social History Narrative   • None     Family History   Problem Relation Age of Onset   • Clotting disorder Mother    • Leukemia Father         Review of Systems   Constitution: Negative for decreased appetite and weight gain.   Eyes: Negative for blurred vision and double vision.   Cardiovascular: Positive for dyspnea on exertion, irregular heartbeat and leg swelling. Negative for chest pain, near-syncope and syncope.   Respiratory: Positive for shortness of breath.    Endocrine: Negative.  Negative for polydipsia.   Hematologic/Lymphatic: Negative.    Skin: Negative for flushing and itching.   Musculoskeletal: Positive for back pain and muscle weakness.   Gastrointestinal: Negative for abdominal pain, constipation and diarrhea.    Genitourinary: Negative for dysuria and flank pain.   Neurological: Negative for dizziness and headaches.   Psychiatric/Behavioral: Negative.    Allergic/Immunologic: Negative.    All other systems reviewed and are negative.     Objective   Vitals:    07/12/19 1200   BP: 140/70   Pulse: 68   SpO2: 98%        Physical Exam   Constitutional: He is oriented to person, place, and time. He appears well-nourished. No distress.   Utilizes cane for stability of gait   HENT:   Head: Atraumatic.   Eyes: Conjunctivae are normal. No scleral icterus.   Neck: No JVD present.   Cardiovascular: Normal rate.    Murmur (Harsh high-pitched systolic murmur lower sternal border in the right to the clavicle.) heard.  Irregular rhythm   Pulmonary/Chest: Effort normal and breath sounds normal. He has no wheezes.   Abdominal: There is no tenderness. There is no rebound.   Musculoskeletal: He exhibits edema. He exhibits no tenderness.   Neurological: He is oriented to person, place, and time.   Skin: Skin is dry. No rash noted.   Psychiatric: He has a normal mood and affect. His behavior is normal. Judgment and thought content normal.   Nursing note and vitals reviewed.      Labs   Lab Results   Component Value Date    WBC 9.51 12/20/2017    HGB 12.1 (L) 12/20/2017    HCT 37.7 (L) 12/20/2017     12/20/2017    CHOL 183 12/20/2017    TRIG 248 (H) 12/20/2017    HDL 42 (L) 12/20/2017    ALT 20 12/20/2017    AST 23 12/20/2017     12/20/2017    K 5.0 12/20/2017     12/20/2017    CREATININE 1.4 (H) 12/20/2017    BUN 24 (H) 12/20/2017    CO2 24 12/20/2017    TSH 2.00 07/23/2016    INR 1.2 07/25/2016     Imaging: Echocardiogram of April 24, 2019  Interpretation Summary     · Critical calcific aortic valve stenosis, aortic valve area 0.66 cm².  · Mildly dilated left ventricular cavity. Moderate concentric left ventricular hypertrophy. Mildly decreased systolic function. Estimated EF = 40- 45%. Diffuse hypokinesis.  · Normal-sized  right ventricular cavity with preserved systolic function.  · Mildly dilated right atrium.  · Moderately dilated left atrium.  · Severe mitral valve calcification of the posterior leaflet. Sclerotic mitral valve. Mild mitral valve regurgitation.  · Mild tricuspid valve regurgitation with RVSP of 63 mmHg.  · Aortic root calcificied. The sinuses of Valsalva calcified. Effacement of the sinotubular junction.  · IVC not well visualized.  · Normal pericardium. No evidence of pericardial effusion.      ECG   Electronic ventricular pacemaker        Assessment plan:    1.  Critical calcific aortic stenosis with progressive symptoms of systolic and diastolic congestive heart failure.  I had an extended in-depth discussion lasting 60 minutes with the patient and family.  The etiology of aortic valve stenosis, the associated symptoms of concern and management of the condition were discussed in complete detail.  We discussed options of medical therapy, surgical aortic valve replacement and transcatheter therapy.  Risk and benefits pros and cons of all approaches were discussed in detail.  We discussed the process of evaluation and all of the testing required to properly  plan a valve implant.  I discussed the 2 commercially available devices and how they differ in their indications.  The risk of stroke the possible indication for cerebral protection device was reviewed.  All the patient's questions and those of his son were answered in detail.  He will consider all of his options and if he wishes to proceed with transcatheter therapy, which I think is an appropriate therapy and indicated in his circumstance based on his symptoms, exam and echo, we will initiate that process.    2.  Ischemic cardiomyopathy.  Status post percutaneous coronary intervention as well as coronary bypass graft surgery.  I discussed the concept of diminished LV function with patient and family.  My suspicion that his a depressed ejection fraction is a  combination of ischemic heart disease as well as his valvular condition.  I reviewed with him all the symptoms to be mindful of both from a valvular and coronary perspective and to bring promptly to attention of physicians should they occur.    3.  Sick sinus syndrome.  History of paroxysmal atrial fibrillation status post pacemaker therapy.  Remains anticoagulated.  By history he has a Biotronix pacemaking device in place.  We will coordinate with his electrophysiologist management of the pacemaker at the time of his valve implantation.    4.  Systemic hypertension.  Blood pressure adequately controlled at this time.    5.  History of colon resection for cecal cancer..  Additional history of colonic polyposis which is followed clinically.    6.  Diabetes mellitus.  Managed by his family physician.    7.  Peripheral vascular disease status post lower extremity digit amputation.    I indicated to patient and family that it is my impression that mechanical intervention into his valvular heart disease with transcatheter therapy is not indicated and potentially very safe approach to treatment of his current complaints referable to his valve stenosis.  I indicated to them that this is predominantly therapy aimed at quality of life improvement rather than any substantial prolongation of life or longevity.  Nonetheless it is my opinion that he is a good candidate for transcatheter aortic valve therapy and likely would be significantly benefited in terms of improved exercise tolerance and diminished risk for cardiac decompensation in the future.    He wishes to further discuss this option with additional members of the family.  He will contact me if he wishes to proceed.  For now, no changes were made medications and no further testing advised.      He and family were encouraged to call with any additional questions.     Efe Mejia MD  7/12/2019

## 2019-07-25 ENCOUNTER — OFFICE VISIT (OUTPATIENT)
Dept: CARDIOLOGY | Facility: CLINIC | Age: 84
End: 2019-07-25
Payer: MEDICARE

## 2019-07-25 VITALS
WEIGHT: 223 LBS | HEART RATE: 94 BPM | BODY MASS INDEX: 28.62 KG/M2 | HEIGHT: 74 IN | OXYGEN SATURATION: 98 % | DIASTOLIC BLOOD PRESSURE: 70 MMHG | SYSTOLIC BLOOD PRESSURE: 110 MMHG

## 2019-07-25 DIAGNOSIS — I25.10 CORONARY ARTERY DISEASE INVOLVING NATIVE CORONARY ARTERY OF NATIVE HEART WITHOUT ANGINA PECTORIS: ICD-10-CM

## 2019-07-25 DIAGNOSIS — I48.20 CHRONIC ATRIAL FIBRILLATION (CMS/HCC): ICD-10-CM

## 2019-07-25 DIAGNOSIS — Z95.0 PACEMAKER: Primary | ICD-10-CM

## 2019-07-25 DIAGNOSIS — I35.0 NONRHEUMATIC AORTIC VALVE STENOSIS: ICD-10-CM

## 2019-07-25 PROCEDURE — 99214 OFFICE O/P EST MOD 30 MIN: CPT | Performed by: INTERNAL MEDICINE

## 2019-07-25 PROCEDURE — 93000 ELECTROCARDIOGRAM COMPLETE: CPT | Performed by: INTERNAL MEDICINE

## 2019-07-25 ASSESSMENT — ENCOUNTER SYMPTOMS
ORTHOPNEA: 0
TREMORS: 0
DYSPNEA ON EXERTION: 0
ABDOMINAL PAIN: 0
PARESTHESIAS: 0
SYNCOPE: 0
PALPITATIONS: 0
SHORTNESS OF BREATH: 0
COUGH: 0
PND: 0
NEAR-SYNCOPE: 0
FOCAL WEAKNESS: 0
WEAKNESS: 0
IRREGULAR HEARTBEAT: 0
HEMATURIA: 0
ALTERED MENTAL STATUS: 0

## 2019-07-25 NOTE — ASSESSMENT & PLAN NOTE
[employer] Evia single-chamber pacemaker implanted September 16, 2013.  Remaining battery longevity is 6 years 3 months.  Parameter summary is VVI-CLS 60 bpm.  R wave is 25.2 mV, threshold 1.8 V at 0.4 ms and lead impedance 526 ohms.  95% ventricular pacing.  We will continue to monitor him closely.

## 2019-07-25 NOTE — ASSESSMENT & PLAN NOTE
He has a history of coronary heart disease status post percutaneous coronary intervention and bypass graft surgery.  Followed by Dr. Mejia and Dr. Bahena.

## 2019-07-25 NOTE — ASSESSMENT & PLAN NOTE
Chronic and asymptomatic.  He is anticoagulated on Eliquis 5 mg twice daily with no evidence of thromboembolic or hemorrhagic events.

## 2019-07-25 NOTE — ASSESSMENT & PLAN NOTE
Echo 4/24/2019 Critical calcific aortic valve stenosis, aortic valve area 0.66 cm².  He saw Dr. eMjia and they reviewed the TAVR procedure.  Patient has decided to go forth with the procedure.

## 2019-07-25 NOTE — PROGRESS NOTES
Electrophysiology  Outpatient Progress Note       Reason for visit:   No chief complaint on file.      HPI   Cain Pruitt is a 87 y.o. male who is seen today in this office for follow up of chronic permanent atrial fibrillation and sick sinus syndrome.  He has a permanent pacemaker.  He describes limitations in his exercise endurance with increasing dyspnea on exertion.    He has a history of coronary heart disease status post percutaneous coronary intervention and bypass graft surgery.    He recently saw Dr. Efe Barrett to discuss the need for TAVR in the setting of critical calcific aortic valve stenosis, aortic valve area 0.66 cm².     Past Medical History:   Diagnosis Date   • A-fib (CMS/[facility]) (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) ([facility])    • Pacemaker 1/24/2019   • Peripheral neuropathy    • Sleep apnea    • Syncope     2008,2011   • Type 2 diabetes mellitus (CMS/[facility]) ([facility])    • Vitamin D deficiency      Past Surgical History:   Procedure Laterality Date   • AMPUTATION FOOT / TOE Right    • BOWEL RESECTION     • CARDIAC PACEMAKER PLACEMENT      2013   • CARDIAC SURGERY      1991   • CORONARY STENT PLACEMENT      3 stents   • KNEE SURGERY      1951       Social History   Substance Use Topics   • Smoking status: Never Smoker   • Smokeless tobacco: Never Used   • Alcohol use Yes      Comment: occassionally/social     Family History   Problem Relation Age of Onset   • Clotting disorder Mother    • Leukemia Father        ALLERGY:  No known allergies    Current Outpatient Prescriptions   Medication Sig Dispense Refill   • apixaban (ELIQUIS) 5 mg tablet Take  5 mg by mouth 2 (two) times a day.     • bumetanide (BUMEX) 0.5 mg tablet Take 0.5 mg by mouth daily.     • cholecalciferol, vitamin D3, (cholecalciferol) 1,000 unit tablet Take by mouth daily.     • cyanocobalamin (vitamin B-12) 1,000 mcg tablet Take 1,000 mcg by mouth daily.     • dutasteride (AVODART) 0.5 mg capsule   0   • ezetimibe (ZETIA) 10 mg tablet Take 1 tablet (10 mg total) by mouth daily. 90 tablet 3   • ferrous sulfate 325 mg (65 mg iron) tablet Take 65 mg by mouth daily with breakfast.     • ipratropium (ATROVENT) 0.03 % nasal spray Administer 0.03 % into affected nostril(s). 2 sprays by intranasal route 2-3 times every day in each nostril     • LACTOBACILLUS ACIDOPHILUS (PROBIOTIC ORAL) Take by mouth daily.     • lisinopril (PRINIVIL) 10 mg tablet Take 1 tablet (10 mg total) by mouth daily. 90 tablet 3   • loperamide (IMODIUM A-D) 2 mg tablet Take 2 mg by mouth as needed.     • metoprolol succinate XL (TOPROL-XL) 50 mg 24 hr tablet Take 50 mg by mouth daily.     • mupirocin (BACTROBAN) 2 % ointment Apply 2 % topically 3 (three) times a day.     • potassium chloride (MICRO-K) 10 mEq CR capsule Take 10 mEq by mouth 2 (two) times a day.     • silodosin (RAPAFLO) 8 mg capsule Take 8 mg by mouth daily.       No current facility-administered medications for this visit.        Review of Systems   Constitution: Negative for weakness and malaise/fatigue.   HENT: Negative for hearing loss.    Eyes: Negative for visual disturbance.   Cardiovascular: Negative for chest pain, dyspnea on exertion, irregular heartbeat, leg swelling, near-syncope, orthopnea, palpitations, paroxysmal nocturnal dyspnea and syncope.   Respiratory: Negative for cough and shortness of breath.    Hematologic/Lymphatic: Negative for bleeding problem.   Skin: Negative for rash.   Musculoskeletal: Negative for joint pain and muscle weakness.   Gastrointestinal: Negative for abdominal pain.   Genitourinary: Negative for hematuria.    Neurological: Negative for focal weakness, paresthesias and tremors.   Psychiatric/Behavioral: Negative for altered mental status.       Objective   Vitals:    07/25/19 1322   BP: 110/70   Pulse: 94   SpO2: 98%     BP Readings from Last 3 Encounters:   07/25/19 110/70   07/12/19 140/70   04/24/19 140/72       Physical Exam   Constitutional: He is oriented to person, place, and time. He appears well-developed and well-nourished.   HENT:   Head: Normocephalic and atraumatic.   Eyes: Pupils are equal, round, and reactive to light. Conjunctivae are normal.   Neck: Normal range of motion.   Cardiovascular: Normal rate.  An irregular rhythm present.   Murmur heard.   Harsh midsystolic murmur is present with a grade of 3/6  at the upper right sternal border radiating to the neck  Pulmonary/Chest: Breath sounds normal. He has no wheezes. He has no rales.   Abdominal: Soft. Bowel sounds are normal. He exhibits no mass. There is no tenderness.   Musculoskeletal: Normal range of motion. He exhibits no edema or deformity.   Neurological: He is alert and oriented to person, place, and time. No cranial nerve deficit.   Skin: Skin is warm and dry. No rash noted.   Psychiatric: He has a normal mood and affect. His behavior is normal.       Lab Results   Component Value Date    WBC 9.51 12/20/2017    HGB 12.1 (L) 12/20/2017     12/20/2017    CHOL 183 12/20/2017    TRIG 248 (H) 12/20/2017    HDL 42 (L) 12/20/2017    ALT 20 12/20/2017    AST 23 12/20/2017     12/20/2017    K 5.0 12/20/2017    CREATININE 1.4 (H) 12/20/2017    TSH 2.00 07/23/2016    INR 1.2 07/25/2016       Cardiovascular Procedures:    CATH LAB:  Cath - 11/1/2007    ECHO/MUGA:  Echo - 7/15/2010  Echo (Scanned Transthoracic Echo. EF 50%.) - 11/1/2016  Echo (scanned Transthoracic Echocardiography Report EF 45 %) - 12/20/2017.Technically difficult and suboptimal echocardiogram secondary to patient characteristics.Mild concentric left ventricular hypertrophy  with normal cavity size and mildly decreased systolic function. Ejection fraction 45%.Unable to evaluate for wall motion abnormalities given difficulty visualizing endocardial borders.Severe aortic stenosis with peak velocity of 4.1 m/s (Peak instantaneous gradient of 66mmHg) and mean gradient of 34 mmHg.Calculated aortic valve area 0.77cm2. Mild aortic insufficiency.Mild mitral regurgitation. Moderately dilated left atrium.Moderately dilated right ventricular size with preserved systolic function. Mild tricuspid regurgitation with estimated right ventricular systolic pressure of 46 mmHg. Compared to the prior study from 11/2/16, left ventricular function has mildly decreased frrom 50% to 45%. Aortic valve peak gradient has increased from 54mmHg to 66mHg and mean gradient has increased from 24mmHg to 34 mmHg.  Echo 4/24/2019 Critical calcific aortic valve stenosis, aortic valve area 0.66 cm².  · Mildly dilated left ventricular cavity. Moderate concentric left ventricular hypertrophy. Mildly decreased systolic function. Estimated EF = 40- 45%. Diffuse hypokinesis.  · Normal-sized right ventricular cavity with preserved systolic function.  · Mildly dilated right atrium.  · Moderately dilated left atrium.  · Severe mitral valve calcification of the posterior leaflet. Sclerotic mitral valve. Mild mitral valve regurgitation.  · Mild tricuspid valve regurgitation with RVSP of 63 mmHg.  · Aortic root calcificied. The sinuses of Valsalva calcified. Effacement of the sinotubular junction.  · IVC not well visualized.  Normal pericardium. No evidence of pericardial effusion.    ELECTROPHYSIOLOGY:  Devices (Single Chamber PPM (Biotronik-Evia SRT), serial # [id number], ventricular lead(9/16/13)biotronik S60) -9/16/2013    STRESS TESTS:  SEH - 5/20/2009    VASCULAR:  Carotid Duplex (scanned Vascular Carotid) - 12/22/2014    CT/MRI:  Abd/Pelvis CT - 2/7/2012    OTHER:  CXR - 12/3/2011  Other (ABDOMINAL AORTIC US) - 1/20/2011  Other  (CAROTID US) - 1/20/2011    ECG Electronic ventricular pacemaker   Pacemaker ECG,    Assessment   Problem List Items Addressed This Visit        Circulatory    Atrial fibrillation (CMS/HCC)     Chronic and asymptomatic.  He is anticoagulated on Eliquis 5 mg twice daily with no evidence of thromboembolic or hemorrhagic events.         Relevant Orders    ECG 12 lead (Completed)    Coronary artery disease involving native coronary artery without angina pectoris     He has a history of coronary heart disease status post percutaneous coronary intervention and bypass graft surgery.  Followed by Dr. Mejia and Dr. Bahena.         Nonrheumatic aortic valve stenosis     Echo 4/24/2019 Critical calcific aortic valve stenosis, aortic valve area 0.66 cm².   He saw Dr. Mejia and they reviewed the TAVR procedure.  Patient has decided to go forth with the procedure.            Other    Pacemaker - Primary     Biotronik Evia single-chamber pacemaker implanted September 16, 2013.  Remaining battery longevity is 6 years 3 months.  Parameter summary is VVI-CLS 60 bpm.  R wave is 25.2 mV, threshold 1.8 V at 0.4 ms and lead impedance 526 ohms.  95% ventricular pacing.  We will continue to monitor him closely.                    Stevo Meyer MD  8/11/2019

## 2019-07-26 ENCOUNTER — TELEPHONE (OUTPATIENT)
Dept: CARDIOLOGY | Facility: CLINIC | Age: 84
End: 2019-07-26

## 2019-07-26 NOTE — TELEPHONE ENCOUNTER
Hospitalist Hospitalist Hospitalist Hospitalist Hospitalist Orthopedics Orthopedics Orthopedics Orthopedics Orthopedics Orthopedics Orthopedics Orthopedics Orthopedics Pain Medicine Rehab Medicine Will start the process   Hospitalist

## 2019-07-26 NOTE — TELEPHONE ENCOUNTER
Can you schedule for either Dr Lei or Dr Goldstein, who ever is first available. Echo is in PACS TY

## 2019-08-12 PROBLEM — K92.2 GI (GASTROINTESTINAL BLEED): Status: ACTIVE | Noted: 2019-08-12

## 2019-08-12 PROBLEM — I73.9 PAD (PERIPHERAL ARTERY DISEASE) (CMS/HCC): Status: ACTIVE | Noted: 2019-08-12

## 2019-08-12 PROBLEM — C18.0: Status: ACTIVE | Noted: 2019-08-12

## 2019-08-12 PROBLEM — Z98.61 HISTORY OF PERCUTANEOUS CORONARY INTERVENTION: Status: ACTIVE | Noted: 2019-08-12

## 2019-08-12 PROBLEM — E11.9 DIABETES MELLITUS (CMS/HCC): Status: ACTIVE | Noted: 2019-08-12

## 2019-08-12 PROBLEM — G47.33 OSA (OBSTRUCTIVE SLEEP APNEA): Status: ACTIVE | Noted: 2019-08-12

## 2019-08-12 PROBLEM — Z95.1 S/P CABG (CORONARY ARTERY BYPASS GRAFT): Status: ACTIVE | Noted: 2019-08-12

## 2019-08-27 ENCOUNTER — OFFICE VISIT (OUTPATIENT)
Dept: CARDIOTHORACIC SURGERY | Facility: CLINIC | Age: 84
End: 2019-08-27
Payer: MEDICARE

## 2019-08-27 VITALS
BODY MASS INDEX: 28.88 KG/M2 | HEIGHT: 74 IN | TEMPERATURE: 97.7 F | SYSTOLIC BLOOD PRESSURE: 112 MMHG | WEIGHT: 225 LBS | RESPIRATION RATE: 16 BRPM | HEART RATE: 67 BPM | DIASTOLIC BLOOD PRESSURE: 62 MMHG | OXYGEN SATURATION: 96 %

## 2019-08-27 DIAGNOSIS — I35.0 NONRHEUMATIC AORTIC VALVE STENOSIS: Primary | ICD-10-CM

## 2019-08-27 PROCEDURE — 99214 OFFICE O/P EST MOD 30 MIN: CPT | Performed by: THORACIC SURGERY (CARDIOTHORACIC VASCULAR SURGERY)

## 2019-08-27 ASSESSMENT — ENCOUNTER SYMPTOMS
SHORTNESS OF BREATH: 1
DIZZINESS: 0
BACK PAIN: 0
FATIGUE: 1
CONFUSION: 0
ARTHRALGIAS: 1
AGITATION: 0
LIGHT-HEADEDNESS: 0

## 2019-08-27 NOTE — LETTER
August 29, 2019     Efe Mejia MD  [address] [facility]  [address]  [place] [address]    Patient: Cain Pruitt  YOB: 1932  Date of Visit: 8/27/2019      Dear Dr. Mejia:    Thank you for referring Cain Pruitt to me for evaluation. Below are my notes for this consultation.    If you have questions, please do not hesitate to call me. I look forward to following your patient along with you.         Sincerely,        Bryant Lei MD        CC: MD Do Young Scott M, MD  8/29/2019 10:23 AM  Signed  I, Bryant Lei MD, personally performed the services described in this documentation as scribed by ROSALIO Ricci in my presence, and it is both accurate and complete.    8/29/2019 10:23 AM      Bryant Lei MD  8/29/2019 10:23 AM  Signed    Cardiac Surgery    Reason for consultation: Aortic stenosis    HPI  Patient is a 87 y.o. male here for a surgical evaluation of his aortic stenosis.  H ehas been followed by Dr. Bahena for aortic stenosis for years.  He was told he would know when he needed his valve replaced and he states now is that time. He currently complains of not being able to walk further than several feet in the AM and that increases to several yards through out the day.  When pressed he and his son state he experiences JENKINS, SOB with this.  He states he usually stops walking prior to having the symptoms occur.  He rates this as moderate and it is relieved by rest.  Associated symptoms of fatigue, LE edema.  Pertinent negatives include chest pain, dizziness.  PMH:  CABG 1991, Afib, PPM, s/p cecal resection for CA, osteomyelitis with amputated toes in 2017 & 2018, NIDDM    Medical History:   Past Medical History:   Diagnosis Date   • A-fib (CMS/HCC) (HCC)    • Cancer of cecum (CMS/HCC) (HCC) 8/12/2019    S/p resection 2011   • Cecum cancer (CMS/HCC) (HCC)     12/2/2011   • Colon polyps    • Coronary artery disease    • Decreased  cardiac ejection fraction 1/24/2019   • Diabetes mellitus (CMS/[facility]) ([facility]) 8/12/2019   • Edema    • Erectile dysfunction    • GI (gastrointestinal bleed)     6/2016   • GI (gastrointestinal bleed) 8/12/2019   • Heart block 1/24/2019   • History of BPH    • History of percutaneous coronary intervention 8/12/2019    BMS to SVG to OM 2007   • Hyperlipidemia    • Hypertension    • Hypotension    • Lipid disorder    • SIENA (obstructive sleep apnea) 8/12/2019   • Osteomyelitis of toe of right foot (CMS/HCC) ([facility])    • Pacemaker 1/24/2019   • PAD (peripheral artery disease) (CMS/[facility]) ([facility]) 8/12/2019   • Peripheral neuropathy    • S/P CABG (coronary artery bypass graft) 8/12/2019 1991 LIMA to LAD, ARTURO to RDA, SVG to OM   • Sleep apnea    • Syncope     2008,2011   • Type 2 diabetes mellitus (CMS/HCC) ([facility])    • Vitamin D deficiency        Surgical History:   Past Surgical History:   Procedure Laterality Date   • AMPUTATION FOOT / TOE Right    • BOWEL RESECTION     • CARDIAC PACEMAKER PLACEMENT      2013   • CARDIAC SURGERY      1991   • CORONARY STENT PLACEMENT      3 stents   • KNEE SURGERY      1951       Allergies: No known allergies    Current Outpatient Prescriptions   Medication Sig Dispense Refill   • apixaban (ELIQUIS) 5 mg tablet Take 5 mg by mouth 2 (two) times a day.     • bumetanide (BUMEX) 0.5 mg tablet Take 0.5 mg by mouth daily.     • cholecalciferol, vitamin D3, (cholecalciferol) 1,000 unit tablet Take by mouth daily.     • cyanocobalamin (vitamin B-12) 1,000 mcg tablet Take 1,000 mcg by mouth daily.     • dutasteride (AVODART) 0.5 mg capsule   0   • ezetimibe (ZETIA) 10 mg tablet Take 1 tablet (10 mg total) by mouth daily. 90 tablet 3   • ferrous sulfate 325 mg (65 mg iron) tablet Take 65 mg by mouth daily with breakfast.     • ipratropium (ATROVENT) 0.03 % nasal spray Administer 0.03 % into affected nostril(s) as needed. 2 sprays by intranasal route 2-3 times every day in each nostril      • LACTOBACILLUS  ACIDOPHILUS (PROBIOTIC ORAL) Take by mouth daily.     • lisinopril (PRINIVIL) 10 mg tablet Take 1 tablet (10 mg total) by mouth daily. 90 tablet 3   • loperamide (IMODIUM A-D) 2 mg tablet Take 2 mg by mouth as needed.     • metoprolol succinate XL (TOPROL-XL) 50 mg 24 hr tablet Take 50 mg by mouth daily.     • mupirocin (BACTROBAN) 2 % ointment Apply 2 % topically as needed.       • potassium chloride (MICRO-K) 10 mEq CR capsule Take 10 mEq by mouth daily.       • silodosin (RAPAFLO) 8 mg capsule Take 8 mg by mouth daily.       No current facility-administered medications for this visit.        Social History:   Social History     Social History   • Marital status:      Spouse name: N/A   • Number of children: N/A   • Years of education: N/A     Social History Main Topics   • Smoking status: Never Smoker   • Smokeless tobacco: Never Used   • Alcohol use Yes      Comment: occassionally/social   • Drug use: Unknown   • Sexual activity: Defer     Other Topics Concern   • None     Social History Narrative   • None       Family History:   Family History   Problem Relation Age of Onset   • Clotting disorder Mother    • Leukemia Father        Review of Systems  Review of Systems   Constitutional: Positive for fatigue.   Respiratory: Positive for shortness of breath.         No orthopnea   Cardiovascular: Positive for leg swelling. Negative for chest pain.   Genitourinary:        No nocturia.  Straight cath's 3 x's a day for the last year   Musculoskeletal: Positive for arthralgias and gait problem. Negative for back pain.   Neurological: Negative for dizziness and light-headedness.   Psychiatric/Behavioral: Negative for agitation, behavioral problems and confusion.   All other systems reviewed and are negative.      Objective     Vital Signs for the last 24 hours:  Temp:  [36.5 °C (97.7 °F)] 36.5 °C (97.7 °F)  Heart Rate:  [67] 67  Resp:  [16] 16  BP: (112)/(62) 112/62    Physical Exam   Constitutional: He is  oriented to person, place, and time. He appears well-developed and well-nourished.   HENT:   Head: Normocephalic and atraumatic.   Eyes: Pupils are equal, round, and reactive to light. EOM are normal.   Neck: Normal range of motion. Neck supple.   Cardiovascular: Regular rhythm, S1 normal, S2 normal and intact distal pulses.    Murmur heard.   Systolic murmur is present with a grade of 3/6   Pulmonary/Chest: Effort normal and breath sounds normal.   Abdominal: Soft. Bowel sounds are normal.   Musculoskeletal: He exhibits edema.   + 2 LE edema   Neurological: He is alert and oriented to person, place, and time.   Skin: Skin is warm and dry. Capillary refill takes 2 to 3 seconds.   Psychiatric: He has a normal mood and affect. His behavior is normal. Judgment and thought content normal.   Vitals reviewed.          Assessment/Plan     Assessment: Aortic stenosis  Echo reviewed by Dr. Lei.  Severe symptomatic calcified aortic stenosis with symptoms of JENKINS, LE edema and fatigue.  COURT 0.66, mean gradient 40mmHG, peak velocity 4.64m/s,  EF45%.  No recent hospitalization for CHF.   [facility] III.   Patient managed with daily diuretics.  Patient denies chest pain and syncope. They are on chronic medical therapy.  Plan:  STS risk for surgical intervention is 4%.  Patient is frail appearing and failed frailty exam.  Recommend proceeding with TAVR workup. Will need a cardiac catheterization, CTA chest abdomen and pelvis followed by a second surgical opinion with Dr. Goldstein.  Patient verbalized understanding and is agreeable to this plan    Afib   He is medicated with toprol and Eliquis.  Rhythm seems regular at time of exam.  Plan:  Will stop Eliquis 2 days before surgery    PPM  Placed in 2013.  Plan:  Will benefit him post operatively.     BPH  He has straight cath'd 3 x's a day for the last year.  He is on Rapaflo and Avodart.  Plan:  Expect he has a colonized UTI at this point.  Will treat once sensitivities are back if  so as he is having a valve replacement.      Left toe wound  Recently finished a course of Keflex for a infection of the second toe on the left.  He follows with Dr. Moore for his wound issues.    Plan:  Will donya a clearance letter stating no active infection prior to TAVR    I, ROSALIO Ricci am scribing for and in the presence of ROSALIO Cohen

## 2019-08-29 NOTE — PROGRESS NOTES
I, Bryant Lei MD, personally performed the services described in this documentation as scribed by ROSALIO Ricci in my presence, and it is both accurate and complete.    8/29/2019 10:23 AM

## 2019-09-16 ENCOUNTER — TELEPHONE (OUTPATIENT)
Dept: SCHEDULING | Facility: CLINIC | Age: 84
End: 2019-09-16

## 2019-09-24 ENCOUNTER — HOSPITAL ENCOUNTER (OUTPATIENT)
Facility: HOSPITAL | Age: 84
Setting detail: HOSPITAL OUTPATIENT SURGERY
Discharge: HOME | End: 2019-09-24
Attending: INTERNAL MEDICINE | Admitting: INTERNAL MEDICINE
Payer: MEDICARE

## 2019-09-24 VITALS
OXYGEN SATURATION: 99 % | DIASTOLIC BLOOD PRESSURE: 70 MMHG | BODY MASS INDEX: 28.23 KG/M2 | TEMPERATURE: 97.5 F | HEART RATE: 66 BPM | HEIGHT: 74 IN | WEIGHT: 220 LBS | SYSTOLIC BLOOD PRESSURE: 155 MMHG

## 2019-09-24 DIAGNOSIS — I35.0 NONRHEUMATIC AORTIC VALVE STENOSIS: ICD-10-CM

## 2019-09-24 LAB
GLUCOSE BLD-MCNC: 130 MG/DL (ref 70–99)
GLUCOSE BLD-MCNC: 138 MG/DL (ref 70–99)
POCT TEST: ABNORMAL
POCT TEST: ABNORMAL

## 2019-09-24 PROCEDURE — C1769 GUIDE WIRE: HCPCS | Performed by: INTERNAL MEDICINE

## 2019-09-24 PROCEDURE — 93454 CORONARY ARTERY ANGIO S&I: CPT | Performed by: INTERNAL MEDICINE

## 2019-09-24 PROCEDURE — C1887 CATHETER, GUIDING: HCPCS | Performed by: INTERNAL MEDICINE

## 2019-09-24 PROCEDURE — B2121ZZ FLUOROSCOPY OF SINGLE CORONARY ARTERY BYPASS GRAFT USING LOW OSMOLAR CONTRAST: ICD-10-PCS | Performed by: INTERNAL MEDICINE

## 2019-09-24 PROCEDURE — B2181ZZ FLUOROSCOPY OF LEFT INTERNAL MAMMARY BYPASS GRAFT USING LOW OSMOLAR CONTRAST: ICD-10-PCS | Performed by: INTERNAL MEDICINE

## 2019-09-24 PROCEDURE — 93455 CORONARY ART/GRFT ANGIO S&I: CPT

## 2019-09-24 PROCEDURE — 63700000 HC SELF-ADMINISTRABLE DRUG: Performed by: INTERNAL MEDICINE

## 2019-09-24 PROCEDURE — 93455 CORONARY ART/GRFT ANGIO S&I: CPT | Mod: 26 | Performed by: INTERNAL MEDICINE

## 2019-09-24 PROCEDURE — 63600000 HC DRUGS/DETAIL CODE: Performed by: INTERNAL MEDICINE

## 2019-09-24 PROCEDURE — 99153 MOD SED SAME PHYS/QHP EA: CPT | Performed by: INTERNAL MEDICINE

## 2019-09-24 PROCEDURE — 71000001 HC PACU PHASE 1 INITIAL 30MIN: Performed by: INTERNAL MEDICINE

## 2019-09-24 PROCEDURE — 99152 MOD SED SAME PHYS/QHP 5/>YRS: CPT | Performed by: INTERNAL MEDICINE

## 2019-09-24 PROCEDURE — B2171ZZ FLUOROSCOPY OF RIGHT INTERNAL MAMMARY BYPASS GRAFT USING LOW OSMOLAR CONTRAST: ICD-10-PCS | Performed by: INTERNAL MEDICINE

## 2019-09-24 PROCEDURE — 27200000 HC STERILE SUPPLY: Performed by: INTERNAL MEDICINE

## 2019-09-24 PROCEDURE — C1894 INTRO/SHEATH, NON-LASER: HCPCS | Performed by: INTERNAL MEDICINE

## 2019-09-24 PROCEDURE — B2111ZZ FLUOROSCOPY OF MULTIPLE CORONARY ARTERIES USING LOW OSMOLAR CONTRAST: ICD-10-PCS | Performed by: INTERNAL MEDICINE

## 2019-09-24 PROCEDURE — 71000011 HC PACU PHASE 1 EA ADDL MIN: Performed by: INTERNAL MEDICINE

## 2019-09-24 PROCEDURE — 63600105 HC IODINE BASED CONTRAST: Performed by: INTERNAL MEDICINE

## 2019-09-24 RX ORDER — FENTANYL CITRATE 50 UG/ML
INJECTION, SOLUTION INTRAMUSCULAR; INTRAVENOUS AS NEEDED
Status: DISCONTINUED | OUTPATIENT
Start: 2019-09-24 | End: 2019-09-24 | Stop reason: HOSPADM

## 2019-09-24 RX ORDER — NAPROXEN SODIUM 220 MG/1
81 TABLET, FILM COATED ORAL DAILY
Status: DISCONTINUED | OUTPATIENT
Start: 2019-09-24 | End: 2019-09-24 | Stop reason: HOSPADM

## 2019-09-24 RX ORDER — MIDAZOLAM HYDROCHLORIDE 2 MG/2ML
INJECTION, SOLUTION INTRAMUSCULAR; INTRAVENOUS AS NEEDED
Status: DISCONTINUED | OUTPATIENT
Start: 2019-09-24 | End: 2019-09-24 | Stop reason: HOSPADM

## 2019-09-24 RX ORDER — HEPARIN SODIUM 1000 [USP'U]/ML
INJECTION, SOLUTION INTRAVENOUS; SUBCUTANEOUS AS NEEDED
Status: DISCONTINUED | OUTPATIENT
Start: 2019-09-24 | End: 2019-09-24 | Stop reason: HOSPADM

## 2019-09-24 RX ADMIN — ASPIRIN 81 MG 81 MG: 81 TABLET ORAL at 09:57

## 2019-09-24 NOTE — Clinical Note
Patient placed on procedure table in prone position. Positioning devices: all pressure points padded.

## 2019-09-24 NOTE — POST-PROCEDURE NOTE
Patient ambulated in the nurses station with steady gait.  Getting dressed.  Med lock removed.  Watching wrists for 15 minutes then to send home with son driving.

## 2019-09-24 NOTE — POST-PROCEDURE NOTE
Interventional Cardiology    The patient was seen and examined post procedure.  Vital signs are stable.  No complaints to suggest periprocedural complication.  Bilateral radial artery access sites intact without hematoma.    All information reviewed with the patient in detail.  Stable findings of his coronary bypass graft circulation and previous coronary stents.  Minimal progression of atherosclerotic findings in the native circumflex without critical lesion.    Plan will be to continue pursuit of medical management of his coronary disease with further work-up for planned transcatheter aortic valve replacement.    Efe Mejia MD

## 2019-09-24 NOTE — PRE-PROCEDURE NOTE
Cardiac Cath Lab Pre-procedure Note    - Patient was seen and examined at bedside.  - The patient's chart and all data was reviewed.  - The procedure, treatment alternatives, risks and benefits were explained with specific risks discussed.  - Patient was consented for cardiac cath procedure and possible PCI.  - Patient's case was found appropriate for dual antiplatelet therapy.    Patient's clinical presentation to the cardiac cath lab: pre-TAVR.     Patient is at risk for renal failure potentially requiring dialysis due to the presence of pre-procedural abnormal renal function.   Patient appears to be vulnerable.         Total anti-anginal medications: 1.     chronic systolic heart failure.    Pre-sedation assessment  ASA 2   Mallampati class: III - soft palate, base of uvula visible.

## 2019-09-24 NOTE — DISCHARGE INSTRUCTIONS
· Post cardiac catheterization instructions:  · NO driving or operating heavy machinery for 24 hours.  This is because of sedation you received for your procedure.  · On day of discharge, limit activities.  · No heavy lifting greater than 10 lbs for the next 2 days after procedure.    · Remove dressing next day. Keep site clean and dry.  · May shower next day of procedure. Do not submerge catherization site in pool or tub baths for 5 days  · Call if drainage, swelling, bleeding, fever or drainage from catheterization site          Medications:  Please take medications as directed. Any questions or concerns, please contact your physician's office.    Do not take Metformin for 48 hours. May resume at usual dose on 9/27/19    Follow up:Dr. Goldstein  As scheduled  [phone]

## 2019-09-24 NOTE — Clinical Note
Closure device placed for the right radial artery. Closure device used: radial compression system. Closure pressure manually applied. Hemostasis achieved.

## 2019-09-24 NOTE — Clinical Note
The right groin and bilateral radials was clipped, marked  and prepped with ChloraPrep. The patient was draped in a sterile fashion after allowing for the recommended dry time.

## 2019-09-24 NOTE — H&P (VIEW-ONLY)
Cardiac Surgery    Reason for consultation: Aortic stenosis    HPI  Patient is a 87 y.o. male here for a surgical evaluation of his aortic stenosis.  H nalinias been followed by Dr. Bahena for aortic stenosis for years.  He was told he would know when he needed his valve replaced and he states now is that time. He currently complains of not being able to walk further than several feet in the AM and that increases to several yards through out the day.  When pressed he and his son state he experiences JENKINS, SOB with this.  He states he usually stops walking prior to having the symptoms occur.  He rates this as moderate and it is relieved by rest.  Associated symptoms of fatigue, LE edema.  Pertinent negatives include chest pain, dizziness.  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/2011   • Colon polyps    • Coronary artery disease    • Decreased cardiac ejection fraction 1/24/2019   • Diabetes mellitus (CMS/[facility]) ([facility]) 8/12/2019   • Edema    • Erectile dysfunction    • GI (gastrointestinal bleed)     6/2016   • GI (gastrointestinal bleed) 8/12/2019   • Heart block 1/24/2019   • History of BPH    • History of percutaneous coronary intervention 8/12/2019    BMS to SVG to OM 2007   • Hyperlipidemia    • Hypertension    • Hypotension    • Lipid disorder    • SIENA (obstructive sleep apnea) 8/12/2019   • Osteomyelitis of toe of right foot (CMS/[facility]) ([facility])    • Pacemaker 1/24/2019   • PAD (peripheral artery disease) (CMS/[facility]) ([facility]) 8/12/2019   • Peripheral neuropathy    • S/P CABG (coronary artery bypass graft) 8/12/2019    1991 LIMA to LAD, ARTURO to RDA, SVG to OM   • Sleep apnea    • Syncope     2008,2011   • Type 2 diabetes mellitus (CMS/[facility]) ([facility])    • Vitamin D  deficiency        Surgical History:   Past Surgical History:   Procedure Laterality Date   • AMPUTATION FOOT / TOE Right    • BOWEL RESECTION     • CARDIAC PACEMAKER PLACEMENT      2013   • CARDIAC SURGERY      1991   • CORONARY STENT PLACEMENT      3 stents   • KNEE SURGERY      1951       Allergies: No known allergies    Current Outpatient Prescriptions   Medication Sig Dispense Refill   • apixaban (ELIQUIS) 5 mg tablet Take 5 mg by mouth 2 (two) times a day.     • bumetanide (BUMEX) 0.5 mg tablet Take 0.5 mg by mouth daily.     • cholecalciferol, vitamin D3, (cholecalciferol) 1,000 unit tablet Take by mouth daily.     • cyanocobalamin (vitamin B-12) 1,000 mcg tablet Take 1,000 mcg by mouth daily.     • dutasteride (AVODART) 0.5 mg capsule   0   • ezetimibe (ZETIA) 10 mg tablet Take 1 tablet (10 mg total) by mouth daily. 90 tablet 3   • ferrous sulfate 325 mg (65 mg iron) tablet Take 65 mg by mouth daily with breakfast.     • ipratropium (ATROVENT) 0.03 % nasal spray Administer 0.03 % into affected nostril(s) as needed. 2 sprays by intranasal route 2-3 times every day in each nostril      • LACTOBACILLUS ACIDOPHILUS (PROBIOTIC ORAL) Take by mouth daily.     • lisinopril (PRINIVIL) 10 mg tablet Take 1 tablet (10 mg total) by mouth daily. 90 tablet 3   • loperamide (IMODIUM A-D) 2 mg tablet Take 2 mg by mouth as needed.     • metoprolol succinate XL (TOPROL-XL) 50 mg 24 hr tablet Take 50 mg by mouth daily.     • mupirocin (BACTROBAN) 2 % ointment Apply 2 % topically as needed.       • potassium chloride (MICRO-K) 10 mEq CR capsule Take 10 mEq by mouth daily.       • silodosin (RAPAFLO) 8 mg capsule Take 8 mg by mouth daily.       No current facility-administered medications for this visit.        Social History:   Social History     Social History   • Marital status:      Spouse name: N/A   • Number of children: N/A   • Years of education: N/A     Social History Main Topics   • Smoking status: Never Smoker    • Smokeless tobacco: Never Used   • Alcohol use Yes      Comment: occassionally/social   • Drug use: Unknown   • Sexual activity: Defer     Other Topics Concern   • None     Social History Narrative   • None       Family History:   Family History   Problem Relation Age of Onset   • Clotting disorder Mother    • Leukemia Father        Review of Systems  Review of Systems   Constitutional: Positive for fatigue.   Respiratory: Positive for shortness of breath.         No orthopnea   Cardiovascular: Positive for leg swelling. Negative for chest pain.   Genitourinary:        No nocturia.  Straight cath's 3 x's a day for the last year   Musculoskeletal: Positive for arthralgias and gait problem. Negative for back pain.   Neurological: Negative for dizziness and light-headedness.   Psychiatric/Behavioral: Negative for agitation, behavioral problems and confusion.   All other systems reviewed and are negative.      Objective     Vital Signs for the last 24 hours:  Temp:  [36.5 °C (97.7 °F)] 36.5 °C (97.7 °F)  Heart Rate:  [67] 67  Resp:  [16] 16  BP: (112)/(62) 112/62    Physical Exam   Constitutional: He is oriented to person, place, and time. He appears well-developed and well-nourished.   HENT:   Head: Normocephalic and atraumatic.   Eyes: Pupils are equal, round, and reactive to light. EOM are normal.   Neck: Normal range of motion. Neck supple.   Cardiovascular: Regular rhythm, S1 normal, S2 normal and intact distal pulses.    Murmur heard.   Systolic murmur is present with a grade of 3/6   Pulmonary/Chest: Effort normal and breath sounds normal.   Abdominal: Soft. Bowel sounds are normal.   Musculoskeletal: He exhibits edema.   + 2 LE edema   Neurological: He is alert and oriented to person, place, and time.   Skin: Skin is warm and dry. Capillary refill takes 2 to 3 seconds.   Psychiatric: He has a normal mood and affect. His behavior is normal. Judgment and thought content normal.   Vitals  reviewed.          Assessment/Plan     Assessment: Aortic stenosis  Echo reviewed by Dr. Lei.  Severe symptomatic calcified aortic stenosis with symptoms of JENKINS, LE edema and fatigue.  COURT 0.66, mean gradient 40mmHG, peak velocity 4.64m/s,  EF45%.  No recent hospitalization for CHF.   [facility] III.   Patient managed with daily diuretics.  Patient denies chest pain and syncope. They are on chronic medical therapy.  Plan:  STS risk for surgical intervention is 4%.  Patient is frail appearing and failed frailty exam.  Recommend proceeding with TAVR workup. Will need a cardiac catheterization, CTA chest abdomen and pelvis followed by a second surgical opinion with Dr. Goldstein.  Patient verbalized understanding and is agreeable to this plan    Afib   He is medicated with toprol and Eliquis.  Rhythm seems regular at time of exam.  Plan:  Will stop Eliquis 2 days before surgery    PPM  Placed in 2013.  Plan:  Will benefit him post operatively.     BPH  He has straight cath'd 3 x's a day for the last year.  He is on Rapaflo and Avodart.  Plan:  Expect he has a colonized UTI at this point.  Will treat once sensitivities are back if so as he is having a valve replacement.      Left toe wound  Recently finished a course of Keflex for a infection of the second toe on the left.  He follows with Dr. Moore for his wound issues.    Plan:  Will donya a clearance letter stating no active infection prior to TAVR    I, ROSALIO Ricci am scribing for and in the presence of ROSALIO Cohen

## 2019-09-24 NOTE — Clinical Note
clipped, marked The patient was draped in a sterile fashion after allowing for the recommended dry time.

## 2019-10-14 ENCOUNTER — HOSPITAL ENCOUNTER (OUTPATIENT)
Dept: RADIOLOGY | Facility: HOSPITAL | Age: 84
Discharge: HOME | End: 2019-10-14
Attending: NURSE PRACTITIONER
Payer: MEDICARE

## 2019-10-14 ENCOUNTER — OFFICE VISIT (OUTPATIENT)
Dept: CARDIOTHORACIC SURGERY | Facility: CLINIC | Age: 84
End: 2019-10-14
Payer: MEDICARE

## 2019-10-14 VITALS
SYSTOLIC BLOOD PRESSURE: 106 MMHG | RESPIRATION RATE: 16 BRPM | HEIGHT: 74 IN | HEART RATE: 88 BPM | OXYGEN SATURATION: 99 % | WEIGHT: 225 LBS | DIASTOLIC BLOOD PRESSURE: 60 MMHG | BODY MASS INDEX: 28.88 KG/M2 | TEMPERATURE: 97.4 F

## 2019-10-14 DIAGNOSIS — I35.0 NONRHEUMATIC AORTIC VALVE STENOSIS: Primary | ICD-10-CM

## 2019-10-14 DIAGNOSIS — I35.0 NONRHEUMATIC AORTIC VALVE STENOSIS: ICD-10-CM

## 2019-10-14 PROCEDURE — 99213 OFFICE O/P EST LOW 20 MIN: CPT | Performed by: THORACIC SURGERY (CARDIOTHORACIC VASCULAR SURGERY)

## 2019-10-14 PROCEDURE — 71275 CT ANGIOGRAPHY CHEST: CPT

## 2019-10-14 PROCEDURE — 63600105 HC IODINE BASED CONTRAST: Performed by: NURSE PRACTITIONER

## 2019-10-14 RX ORDER — MUPIROCIN 20 MG/G
1 OINTMENT TOPICAL 2 TIMES DAILY
Qty: 22 G | Refills: 0 | Status: SHIPPED | OUTPATIENT
Start: 2019-10-14 | End: 2019-12-17 | Stop reason: ALTCHOICE

## 2019-10-14 RX ADMIN — IOHEXOL 100 ML: 350 INJECTION, SOLUTION INTRAVENOUS at 09:54

## 2019-10-14 ASSESSMENT — ENCOUNTER SYMPTOMS
SHORTNESS OF BREATH: 1
CHEST TIGHTNESS: 1
AGITATION: 0
ARTHRALGIAS: 1
CONFUSION: 0
FATIGUE: 1
DIZZINESS: 0

## 2019-10-14 NOTE — PROGRESS NOTES
Cardiac Surgery    Reason for consultation: Aortic stenosis  HPI  Patient is a 87 y.o. malehere for a second surgical opinion of his aortic stenosis.   He has been followed by Dr. Bahena for aortic stenosis for years.  He now follows with Dr. Mejia.  He was told he would know when he needed his valve replaced and he states now is that time. He currently complains of not being able to walk further than several feet in the AM and that increases to several yards through out the day.  When pressed he states he does experience JENKINS, SOB with this.  He has had some chest tightness with exertion as well.  He states he usually stops walking prior to having these symptoms.  He rates this as moderate and it is relieved by rest.  Associated symptoms of fatigue.  Pertinent negatives include lLE edema and dizziness.  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[facility])     12/2/2011   • Colon polyps    • Coronary artery disease    • Decreased cardiac ejection fraction 1/24/2019   • Diabetes mellitus (CMS/[facility]) 8/12/2019   • Edema    • Erectile dysfunction    • GI (gastrointestinal bleed)     6/2016   • GI (gastrointestinal bleed) 8/12/2019   • Heart block 1/24/2019   • History of BPH    • History of percutaneous coronary intervention 8/12/2019    BMS to SVG to OM 2007   • Hyperlipidemia    • Hypertension    • Hypotension    • Lipid disorder    • SIENA (obstructive sleep apnea) 8/12/2019   • Osteomyelitis of toe of right foot (CMS/[facility])    • Pacemaker 1/24/2019   • PAD (peripheral artery disease) (CMS/[facility]) 8/12/2019   • Peripheral neuropathy    • S/P CABG (coronary artery bypass graft) 8/12/2019    1991 LIMA to LAD, ARTURO to RDA, SVG to OM   • Sleep apnea    • Syncope     2008,2011   • Type 2 diabetes  mellitus (CMS/[facility])    • Vitamin D deficiency        Surgical History:   Past Surgical History:   Procedure Laterality Date   • AMPUTATION FOOT / TOE Right    • BOWEL RESECTION     • CARDIAC PACEMAKER PLACEMENT      2013   • CARDIAC SURGERY      1991   • CORONARY STENT PLACEMENT      3 stents   • KNEE SURGERY      1951       Allergies: No known allergies    Current Outpatient Medications   Medication Sig Dispense Refill   • apixaban (ELIQUIS) 5 mg tablet Take 5 mg by mouth 2 (two) times a day.     • bumetanide (BUMEX) 0.5 mg tablet Take 0.5 mg by mouth daily.     • cholecalciferol, vitamin D3, (cholecalciferol) 1,000 unit tablet Take 1,000 Units by mouth daily.       • cyanocobalamin (vitamin B-12) 1,000 mcg tablet Take 1,000 mcg by mouth daily.     • dutasteride (AVODART) 0.5 mg capsule Take 0.5 mg by mouth daily.    0   • ezetimibe (ZETIA) 10 mg tablet Take 1 tablet (10 mg total) by mouth daily. 90 tablet 3   • ferrous sulfate 325 mg (65 mg iron) tablet Take 65 mg by mouth daily with breakfast.     • ipratropium (ATROVENT) 0.03 % nasal spray Administer 0.03 % into affected nostril(s) as needed. 2 sprays by intranasal route 2-3 times every day in each nostril      • LACTOBACILLUS ACIDOPHILUS (PROBIOTIC ORAL) Take by mouth daily.     • lisinopril (PRINIVIL) 10 mg tablet Take 1 tablet (10 mg total) by mouth daily. 90 tablet 3   • loperamide (IMODIUM A-D) 2 mg tablet Take 2 mg by mouth as needed.     • metFORMIN (GLUCOPHAGE) 500 mg tablet Take 500 mg by mouth daily.       • metoprolol succinate XL (TOPROL-XL) 50 mg 24 hr tablet Take 50 mg by mouth daily.     • mupirocin (BACTROBAN) 2 % ointment Apply 2 % topically as needed.       • potassium chloride (MICRO-K) 10 mEq CR capsule Take 10 mEq by mouth every other day.       • silodosin (RAPAFLO) 8 mg capsule Take 8 mg by mouth daily.     • mupirocin (BACTROBAN) 2 % ointment Apply 1 application topically 2 (two) times a day for 5 days. Nasal application, starting 5 days  before surgery 22 g 0     No current facility-administered medications for this visit.        Social History:   Social History     Socioeconomic History   • Marital status:      Spouse name: None   • Number of children: None   • Years of education: None   • Highest education level: None   Occupational History   • None   Social Needs   • Financial resource strain: None   • Food insecurity:     Worry: None     Inability: None   • Transportation needs:     Medical: None     Non-medical: None   Tobacco Use   • Smoking status: Never Smoker   • Smokeless tobacco: Never Used   Substance and Sexual Activity   • Alcohol use: Yes     Comment: occassionally/social   • Drug use: Defer   • Sexual activity: Defer   Lifestyle   • Physical activity:     Days per week: None     Minutes per session: None   • Stress: None   Relationships   • Social connections:     Talks on phone: None     Gets together: None     Attends Christianity service: None     Active member of club or organization: None     Attends meetings of clubs or organizations: None     Relationship status: None   • Intimate partner violence:     Fear of current or ex partner: None     Emotionally abused: None     Physically abused: None     Forced sexual activity: None   Other Topics Concern   • None   Social History Narrative   • None       Family History:   Family History   Problem Relation Age of Onset   • Clotting disorder Biological Mother    • Leukemia Biological Father        Review of Systems  Review of Systems   Constitutional: Positive for fatigue.   Respiratory: Positive for chest tightness and shortness of breath.         No orthopnea   Cardiovascular: Negative for chest pain and leg swelling.   Genitourinary:        + nocturia   Musculoskeletal: Positive for arthralgias and gait problem.   Neurological: Negative for dizziness.   Psychiatric/Behavioral: Negative for agitation, behavioral problems and confusion.   All other systems reviewed and are  "negative.      Objective     Vital Signs for the last 24 hours:  Visit Vitals  /60 (BP Location: Left upper arm, Patient Position: Sitting)   Pulse 88   Temp 36.3 °C (97.4 °F)   Resp 16   Ht 1.88 m (6' 2\")   Wt 102 kg (225 lb)   SpO2 99%   BMI 28.89 kg/m²       Physical Exam   Constitutional: He is oriented to person, place, and time. He appears well-developed and well-nourished.   HENT:   Head: Normocephalic and atraumatic.   Eyes: Pupils are equal, round, and reactive to light. EOM are normal.   Neck: Normal range of motion. Neck supple.   Cardiovascular: S1 normal and S2 normal.   Murmur heard.   Systolic murmur is present with a grade of 3/6.  Pulmonary/Chest: Effort normal and breath sounds normal.   Abdominal: Soft. Bowel sounds are normal.   Musculoskeletal: Normal range of motion. He exhibits no edema.   Neurological: He is alert and oriented to person, place, and time.   Skin: Skin is warm and dry. Capillary refill takes 2 to 3 seconds.   Psychiatric: He has a normal mood and affect. His behavior is normal. Thought content normal.   Vitals reviewed.          Assessment/Plan   Assessment: Aortic stenosis  Echo, cath and CTA reviewed by Dr. Goldstein.  Severe calcific aortic stenosis symptomatic with occasional lightheadedness, JENKINS.  COURT 0.66, mean gradient 4.64m/sec , velocity 40mmHg, LVOT 0.9m/sec, Mild MR/TR, Severe mitral annular calcification.  EF45%.  Symptoms not managed with daily diuretics.  [facility] II. No chest discomfort. No syncopal or presyncopal episodes.  The patient is tolerating medical therapy.  Nonobstructive CAD.  Patent femoral iliac vessels.  Plan:  STS risk for surgical intervention is 4%.  Patient is frail and failed frailty exam.  Recommend proceeding with a TF TAVR.  Will need to stop lisinopril 5 days prior to surgery.  Also needs carotid ultrasound and PATs prior to TAVR.  I have discussed with the patient and family the rationale for procedures as well as possible alternatives. "  We discussed potential risks and complications associated with this procedure. The risks include, but are not limited to: bleeding, infection, arrhythmias, myocardial infarction, annita-valvular insufficiency,  stroke, death, renal as well as pulmonary insufficiency and the possibility of blood transfusions,  permanent pacemaker placement intra-aortic balloon placement,  assist device placement, cardiac wire perforation, and requirement of CPB.    The patient and family understand and agree to proceed.    CAfib  Managed with toprol and Eliquis.  Plan: Will need to stop ELiquis 2 days before surgery.    Permanent pacemaker   Plan:  This will benefit him post op TAVR        IChelsy CRNP am scribing for and in the presence of MD MARIA ESTHER Huerta Roberto Rodriguez, MD, personally performed the services described in this documentation as scribed by ROSALIO Ricci in my presence, and it is both accurate and complete.    10/15/2019 1:47 PM

## 2019-10-14 NOTE — LETTER
October 15, 2019     Aguilar Phan MD  [address] [facility] [address]    Patient: Cain Pruitt  YOB: 1932  Date of Visit: 10/14/2019      Dear Dr. Phan:    Thank you for referring Cain Pruitt to me for evaluation. Below are my notes for this consultation.    If you have questions, please do not hesitate to call me. I look forward to following your patient along with you.         Sincerely,        Josue Goldstein MD        CC: MD Triston Wesley Roberto, MD  10/15/2019  1:48 PM  Signed    Cardiac Surgery    Reason for consultation: Aortic stenosis  HPI  Patient is a 87 y.o. malehere for a second surgical opinion of his aortic stenosis.   BROOKE charles has been followed by Dr. Bahena for aortic stenosis for years.   He now follows with Dr. Mejia.  He was told he would know when he needed his valve replaced and he states now is that time. He currently complains of not being able to walk further than several feet in the AM and that increases to several yards through out the day.  When pressed he states he does experience JENKINS, SOB with this.   He has had some chest tightness with exertion as well.  He states he usually stops walking prior to having these symptoms.  He rates this as moderate and it is relieved by rest.  Associated symptoms of fatigue.  Pertinent negatives include lLE edema and dizziness.  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/2011   • Colon polyps    • Coronary artery disease    • Decreased cardiac ejection fraction 1/24/2019   • Diabetes mellitus (CMS/HCC) 8/12/2019   • Edema    • Erectile dysfunction    • GI (gastrointestinal bleed)     6/2016   • GI (gastrointestinal bleed) 8/12/2019   • Heart block 1/24/2019    • History of BPH    • History of percutaneous coronary intervention 8/12/2019    BMS to SVG to OM 2007   • Hyperlipidemia    • Hypertension    • Hypotension    • Lipid disorder    • SIENA (obstructive sleep apnea) 8/12/2019   • Osteomyelitis of toe of right foot (CMS/HCC)    • Pacemaker 1/24/2019   • PAD (peripheral artery disease) (CMS/[facility]) 8/12/2019   • Peripheral neuropathy    • S/P CABG (coronary artery bypass graft) 8/12/2019    1991 LIMA to LAD, ARTURO to RDA, SVG to OM   • Sleep apnea    • Syncope     2008,2011   • Type 2 diabetes mellitus (CMS/[facility])    • Vitamin D deficiency        Surgical History:   Past Surgical History:   Procedure Laterality Date   • AMPUTATION FOOT / TOE Right    • BOWEL RESECTION     • CARDIAC PACEMAKER PLACEMENT      2013   • CARDIAC SURGERY      1991   • CORONARY STENT PLACEMENT      3 stents   • KNEE SURGERY      1951       Allergies: No known allergies    Current Outpatient Medications   Medication Sig Dispense Refill   • apixaban (ELIQUIS) 5 mg tablet Take 5 mg by mouth 2 (two) times a day.     • bumetanide (BUMEX) 0.5 mg tablet Take 0.5 mg by mouth daily.     • cholecalciferol, vitamin D3, (cholecalciferol) 1,000 unit tablet Take 1,000 Units by mouth daily.       • cyanocobalamin (vitamin B-12) 1,000 mcg tablet Take 1,000 mcg by mouth daily.     • dutasteride (AVODART) 0.5 mg capsule Take 0.5 mg by mouth daily.    0   • ezetimibe (ZETIA) 10 mg tablet Take 1 tablet (10 mg total) by mouth daily. 90 tablet 3   • ferrous sulfate 325 mg (65 mg iron) tablet Take 65 mg by mouth daily with breakfast.     • ipratropium (ATROVENT) 0.03 % nasal spray Administer 0.03 % into affected nostril(s) as needed. 2 sprays by intranasal route 2-3 times every day in each nostril      • LACTOBACILLUS ACIDOPHILUS (PROBIOTIC ORAL) Take by mouth daily.     • lisinopril (PRINIVIL) 10 mg tablet Take 1 tablet (10 mg total) by mouth daily. 90 tablet 3   • loperamide (IMODIUM A-D) 2 mg tablet Take 2 mg by mouth  as needed.     • metFORMIN (GLUCOPHAGE) 500 mg tablet Take 500 mg by mouth daily.       • metoprolol succinate XL (TOPROL-XL) 50 mg 24 hr tablet Take 50 mg by mouth daily.     • mupirocin (BACTROBAN) 2 % ointment Apply 2 % topically as needed.       • potassium chloride (MICRO-K) 10 mEq CR capsule Take 10 mEq by mouth every other day.       • silodosin (RAPAFLO) 8 mg capsule Take 8 mg by mouth daily.     • mupirocin (BACTROBAN) 2 % ointment Apply 1 application topically 2 (two) times a day for 5 days. Nasal application, starting 5 days before surgery 22 g 0     No current facility-administered medications for this visit.        Social History:   Social History     Socioeconomic History   • Marital status:      Spouse name: None   • Number of children: None   • Years of education: None   • Highest education level: None   Occupational History   • None   Social Needs   • Financial resource strain: None   • Food insecurity:     Worry: None     Inability: None   • Transportation needs:     Medical: None     Non-medical: None   Tobacco Use   • Smoking status: Never Smoker   • Smokeless tobacco: Never Used   Substance and Sexual Activity   • Alcohol use: Yes     Comment: occassionally/social   • Drug use: Defer   • Sexual activity: Defer   Lifestyle   • Physical activity:     Days per week: None     Minutes per session: None   • Stress: None   Relationships   • Social connections:     Talks on phone: None     Gets together: None     Attends Scientologist service: None     Active member of club or organization: None     Attends meetings of clubs or organizations: None     Relationship status: None   • Intimate partner violence:     Fear of current or ex partner: None     Emotionally abused: None     Physically abused: None     Forced sexual activity: None   Other Topics Concern   • None   Social History Narrative   • None       Family History:   Family History   Problem Relation Age of Onset   • Clotting disorder  "Biological Mother    • Leukemia Biological Father        Review of Systems  Review of Systems   Constitutional: Positive for fatigue.   Respiratory: Positive for chest tightness and shortness of breath.         No orthopnea   Cardiovascular: Negative for chest pain and leg swelling.   Genitourinary:        + nocturia   Musculoskeletal: Positive for arthralgias and gait problem.   Neurological: Negative for dizziness.   Psychiatric/Behavioral: Negative for agitation, behavioral problems and confusion.   All other systems reviewed and are negative.      Objective     Vital Signs for the last 24 hours:  Visit Vitals  /60 (BP Location: Left upper arm, Patient Position: Sitting)   Pulse 88   Temp 36.3 °C (97.4 °F)   Resp 16   Ht 1.88 m (6' 2\")   Wt 102 kg (225 lb)   SpO2 99%   BMI 28.89 kg/m²       Physical Exam   Constitutional: He is oriented to person, place, and time. He appears well-developed and well-nourished.   HENT:   Head: Normocephalic and atraumatic.   Eyes: Pupils are equal, round, and reactive to light. EOM are normal.   Neck: Normal range of motion. Neck supple.   Cardiovascular: S1 normal and S2 normal.   Murmur heard.   Systolic murmur is present with a grade of 3/6.  Pulmonary/Chest: Effort normal and breath sounds normal.   Abdominal: Soft. Bowel sounds are normal.   Musculoskeletal: Normal range of motion. He exhibits no edema.   Neurological: He is alert and oriented to person, place, and time.   Skin: Skin is warm and dry. Capillary refill takes 2 to 3 seconds.   Psychiatric: He has a normal mood and affect. His behavior is normal. Thought content normal.   Vitals reviewed.          Assessment/Plan   Assessment: Aortic stenosis  Echo, cath and CTA reviewed by Dr. Goldstein.  Severe calcific aortic stenosis symptomatic with occasional lightheadedness, JENKINS.  COURT 0.66, mean gradient 4.64m/sec , velocity 40mmHg, LVOT 0.9m/sec, Mild MR/TR, Severe mitral annular calcification.  EF45%.  Symptoms not " managed with daily diuretics.  [employer] II. No chest discomfort. No syncopal or presyncopal episodes.  The patient is tolerating medical therapy.  Nonobstructive CAD.  Patent femoral iliac vessels.  Plan:  STS risk for surgical intervention is 4%.  Patient is frail and failed frailty exam.  Recommend proceeding with a TF TAVR.  Will need to stop lisinopril 5 days prior to surgery.  Also needs carotid ultrasound and PATs prior to TAVR.  I have discussed with the patient and family the rationale for procedures as well as possible alternatives.  We discussed potential risks and complications associated with this procedure. The risks include, but are not limited to: bleeding, infection, arrhythmias, myocardial infarction, annita-valvular insufficiency,  stroke, death, renal as well as pulmonary insufficiency and the possibility of blood transfusions,  permanent pacemaker placement intra-aortic balloon placement,  assist device placement, cardiac wire perforation, and requirement of CPB.    The patient and family understand and agree to proceed.    CAfib  Managed with toprol and Eliquis.  Plan: Will need to stop ELiquis 2 days before surgery.    Permanent pacemaker   Plan:  This will benefit him post op TAVR        IChelsy CRNP am scribing for and in the presence of MD MARIA ESTHER Huerta Roberto Rodriguez, MD, personally performed the services described in this documentation as scribed by ROSALIO Ricci in my presence, and it is both accurate and complete.    10/15/2019 1:47 PM

## 2019-11-13 ENCOUNTER — ANESTHESIA EVENT (INPATIENT)
Dept: OPERATING ROOM | Facility: HOSPITAL | Age: 84
DRG: 267 | End: 2019-11-13
Payer: MEDICARE

## 2019-11-13 ENCOUNTER — HOSPITAL ENCOUNTER (OUTPATIENT)
Dept: RADIOLOGY | Facility: HOSPITAL | Age: 84
Discharge: HOME | End: 2019-11-13
Attending: NURSE PRACTITIONER
Payer: MEDICARE

## 2019-11-13 ENCOUNTER — APPOINTMENT (OUTPATIENT)
Dept: LAB | Facility: HOSPITAL | Age: 84
End: 2019-11-13
Attending: NURSE PRACTITIONER
Payer: MEDICARE

## 2019-11-13 ENCOUNTER — APPOINTMENT (OUTPATIENT)
Dept: PREADMISSION TESTING | Facility: HOSPITAL | Age: 84
End: 2019-11-13
Attending: THORACIC SURGERY (CARDIOTHORACIC VASCULAR SURGERY)
Payer: MEDICARE

## 2019-11-13 VITALS
HEART RATE: 68 BPM | BODY MASS INDEX: 28.75 KG/M2 | SYSTOLIC BLOOD PRESSURE: 157 MMHG | DIASTOLIC BLOOD PRESSURE: 73 MMHG | HEIGHT: 74 IN | WEIGHT: 224 LBS | RESPIRATION RATE: 20 BRPM | TEMPERATURE: 97.1 F | OXYGEN SATURATION: 96 %

## 2019-11-13 DIAGNOSIS — I35.0 NONRHEUMATIC AORTIC VALVE STENOSIS: ICD-10-CM

## 2019-11-13 DIAGNOSIS — Z01.818 ENCOUNTER FOR PREADMISSION TESTING: Primary | ICD-10-CM

## 2019-11-13 LAB
ABO + RH BLD: NORMAL
ALBUMIN SERPL-MCNC: 3.8 G/DL (ref 3.4–5)
ALP SERPL-CCNC: 68 IU/L (ref 35–126)
ALT SERPL-CCNC: 24 IU/L (ref 16–63)
ANION GAP SERPL CALC-SCNC: 10 MEQ/L (ref 3–15)
AST SERPL-CCNC: 20 IU/L (ref 15–41)
ATRIAL RATE: 70
BACTERIA URNS QL MICRO: 2 /HPF
BILIRUB SERPL-MCNC: 0.9 MG/DL (ref 0.3–1.2)
BILIRUB UR QL STRIP.AUTO: NEGATIVE MG/DL
BLD GP AB SCN SERPL QL: NEGATIVE
BLOOD BANK CMNT PATIENT-IMP: NORMAL
BNP SERPL-MCNC: 385 PG/ML
BUN SERPL-MCNC: 29 MG/DL (ref 8–20)
CALCIUM SERPL-MCNC: 9.4 MG/DL (ref 8.9–10.3)
CHLORIDE SERPL-SCNC: 102 MEQ/L (ref 98–109)
CLARITY UR REFRACT.AUTO: ABNORMAL
CO2 SERPL-SCNC: 25 MEQ/L (ref 22–32)
COLOR UR AUTO: YELLOW
CREAT SERPL-MCNC: 1.5 MG/DL
D AG BLD QL: POSITIVE
ERYTHROCYTE [DISTWIDTH] IN BLOOD BY AUTOMATED COUNT: 13.7 % (ref 11.6–14.4)
GFR SERPL CREATININE-BSD FRML MDRD: 44.4 ML/MIN/1.73M*2
GLUCOSE SERPL-MCNC: 141 MG/DL (ref 70–99)
GLUCOSE UR STRIP.AUTO-MCNC: NEGATIVE MG/DL
HCT VFR BLDCO AUTO: 42.4 % (ref 40.1–51)
HGB BLD-MCNC: 14.2 G/DL
HGB UR QL STRIP.AUTO: 2
HYALINE CASTS #/AREA URNS LPF: ABNORMAL /LPF
INR PPP: 1.2 INR
KETONES UR STRIP.AUTO-MCNC: NEGATIVE MG/DL
LABORATORY COMMENT REPORT: NORMAL
LEUKOCYTE ESTERASE UR QL STRIP.AUTO: 3
MCH RBC QN AUTO: 31.9 PG (ref 28–33.2)
MCHC RBC AUTO-ENTMCNC: 33.5 G/DL (ref 32.2–36.5)
MCV RBC AUTO: 95.3 FL (ref 83–98)
NITRITE UR QL STRIP.AUTO: POSITIVE
PDW BLD AUTO: 10.8 FL (ref 9.4–12.4)
PH UR STRIP.AUTO: 6 [PH]
PLATELET # BLD AUTO: 188 K/UL
POTASSIUM SERPL-SCNC: 5.1 MEQ/L (ref 3.6–5.1)
PREALB SERPL-MCNC: 28.1 MG/DL (ref 18–38)
PROT SERPL-MCNC: 6.3 G/DL (ref 6–8.2)
PROT UR QL STRIP.AUTO: NEGATIVE
PROTHROMBIN TIME: 14.8 SEC (ref 12.2–14.5)
QRS DURATION: 200
QT INTERVAL: 488
QTC CALCULATION(BAZETT): 527
R AXIS: -80
RBC # BLD AUTO: 4.45 M/UL (ref 4.5–5.8)
RBC #/AREA URNS HPF: ABNORMAL /HPF
SODIUM SERPL-SCNC: 137 MEQ/L (ref 136–144)
SP GR UR REFRACT.AUTO: 1.01
SQUAMOUS URNS QL MICRO: 1 /HPF
T WAVE AXIS: 90
UROBILINOGEN UR STRIP-ACNC: 0.2 EU/DL
VENTRICULAR RATE: 70
WBC # BLD AUTO: 8.67 K/UL
WBC #/AREA URNS HPF: ABNORMAL /HPF

## 2019-11-13 PROCEDURE — 83880 ASSAY OF NATRIURETIC PEPTIDE: CPT

## 2019-11-13 PROCEDURE — 85610 PROTHROMBIN TIME: CPT

## 2019-11-13 PROCEDURE — 86920 COMPATIBILITY TEST SPIN: CPT

## 2019-11-13 PROCEDURE — 87077 CULTURE AEROBIC IDENTIFY: CPT

## 2019-11-13 PROCEDURE — 81003 URINALYSIS AUTO W/O SCOPE: CPT

## 2019-11-13 PROCEDURE — 84134 ASSAY OF PREALBUMIN: CPT

## 2019-11-13 PROCEDURE — 87081 CULTURE SCREEN ONLY: CPT | Mod: 59

## 2019-11-13 PROCEDURE — 93010 ELECTROCARDIOGRAM REPORT: CPT | Performed by: INTERNAL MEDICINE

## 2019-11-13 PROCEDURE — 92100002 US CAROTID BILATERAL

## 2019-11-13 PROCEDURE — 93005 ELECTROCARDIOGRAM TRACING: CPT

## 2019-11-13 PROCEDURE — 85027 COMPLETE CBC AUTOMATED: CPT

## 2019-11-13 PROCEDURE — 36415 COLL VENOUS BLD VENIPUNCTURE: CPT

## 2019-11-13 PROCEDURE — 80053 COMPREHEN METABOLIC PANEL: CPT

## 2019-11-13 PROCEDURE — 86850 RBC ANTIBODY SCREEN: CPT

## 2019-11-13 SDOH — HEALTH STABILITY: MENTAL HEALTH: HOW OFTEN DO YOU HAVE A DRINK CONTAINING ALCOHOL?: 2-4 TIMES A MONTH

## 2019-11-13 ASSESSMENT — ENCOUNTER SYMPTOMS
ARTHRALGIAS: 1
ENDOCRINE NEGATIVE: 1
ALLERGIC/IMMUNOLOGIC NEGATIVE: 1
ACTIVITY CHANGE: 1
CARDIOVASCULAR NEGATIVE: 1
RESPIRATORY NEGATIVE: 1
PSYCHIATRIC NEGATIVE: 1
NEUROLOGICAL NEGATIVE: 1
GASTROINTESTINAL NEGATIVE: 1
EYES NEGATIVE: 1
BRUISES/BLEEDS EASILY: 1

## 2019-11-13 ASSESSMENT — PAIN SCALES - GENERAL: PAINLEVEL: 0-NO PAIN

## 2019-11-13 NOTE — H&P
"   History and Physical  Pre-admission testing         HISTORY OF PRESENT ILLNESS      Cain Pruitt is an 87 y.o. male with a past medical history of CAGB x 3 bypasses with Dr Lei in 1991, DM, HTN, afib, Hyperlipidemia and colon cancer with history of aortic stenosis for several years.  Patient now with SOB/JENKINS ,  Fatigue.  Pateint denies edema, chest pain, palpitations, lightheadedness or dizziness.  Referred to Dr Goldstein for surgical evaluation who recommends TAVR TRANSFEMORAL AORTIC VALVE REPLACEMENT scheduled for 11/27/19.     PAST MEDICAL AND SURGICAL HISTORY      Past Medical History:   Diagnosis Date   • A-fib (CMS/[facility])    • Arthritis     b/l knees   • BPH (benign prostatic hyperplasia)    • CAD (coronary artery disease)     4 stents placed since 1991   • Cancer of cecum (CMS/[facility]) 2011    S/p resection 2011   • Cecum cancer (CMS/[facility])     12/2/2011 no chemo or XRT.    • Colon polyps    • Coronary artery disease 1991    s/p cabg    • Decreased cardiac ejection fraction 1/24/2019   • Edema    • Erectile dysfunction    • GI (gastrointestinal bleed)    • GI (gastrointestinal bleed) 06/2016   • Heart block 1/24/2019   • History of percutaneous coronary intervention 8/12/2019    BMS to SVG to OM 2007   • History of transfusion 2011    during colon resection had \"nicked artery\"- required multiple unitos pf blood    • Hyperlipidemia    • Hypertension    • Hypotension    • SIENA (obstructive sleep apnea) 8/12/2019   • Osteomyelitis of toe of right foot (CMS/[facility]) 2016    s/p 2nd and 3rd toe osteomylitis    • Pacemaker 1/24/2019   • PAD (peripheral artery disease) (CMS/HCC) 8/12/2019   • Peripheral neuropathy     tips of fingers  and feet    • S/P CABG (coronary artery bypass graft) 1991 1991 LIMA to LAD, ARTURO to RDA, SVG to OM   • Sleep apnea    • Syncope     2008,2011   • Type 2 diabetes mellitus (CMS/[facility])     last HgBA1C was 7.2 in 7/2019 managed by PCP Dr Aguilar Phan   • Vitamin D deficiency        Past " Surgical History:   Procedure Laterality Date   • AMPUTATION FOOT / TOE Right 2016    2nd and 3rd right foot from osteomylitis    • BOWEL RESECTION  2011   • CARDIAC PACEMAKER PLACEMENT  2013   • CARDIAC SURGERY     • CARDIOVERSION  2008   • CORONARY ARTERY BYPASS GRAFT  1991    x 4 bypasses    • CORONARY STENT PLACEMENT      3 stents   • KNEE SURGERY Right 1951       PROBLEM LIST     Patient Active Problem List   Diagnosis   • Atrial fibrillation (CMS/HCC)   • Dyslipidemia   • Coronary artery disease involving native coronary artery without angina pectoris   • Nonrheumatic aortic valve stenosis   • Heart block   • Decreased cardiac ejection fraction   • Pacemaker   • S/P CABG (coronary artery bypass graft)   • History of percutaneous coronary intervention   • Cancer of cecum (CMS/HCC)   • SIENA (obstructive sleep apnea)   • GI (gastrointestinal bleed)   • Diabetes mellitus (CMS/HCC)   • PAD (peripheral artery disease) (CMS/HCC)   • Coronary artery disease   • Type 2 diabetes mellitus (CMS/HCC)   • History of transfusion   • Lipid disorder   • Hyperlipidemia   • Osteomyelitis of toe of right foot (CMS/HCC)   • BPH (benign prostatic hyperplasia)   • Cecum cancer (CMS/HCC)   • Peripheral neuropathy   • Syncope   • Arthritis       MEDICATIONS        Current Outpatient Medications:   •  apixaban (ELIQUIS) 5 mg tablet, Take 5 mg by mouth 2 (two) times a day. To stop 2 days pre-op , Disp: , Rfl:   •  bumetanide (BUMEX) 0.5 mg tablet, Take 0.5 mg by mouth every morning.  , Disp: , Rfl:   •  cholecalciferol, vitamin D3, (cholecalciferol) 1,000 unit tablet, Take 1,000 Units by mouth daily.  , Disp: , Rfl:   •  cyanocobalamin (vitamin B-12) 1,000 mcg tablet, Take 1,000 mcg by mouth daily., Disp: , Rfl:   •  dutasteride (AVODART) 0.5 mg capsule, Take 0.5 mg by mouth nightly.  , Disp: , Rfl: 0  •  ezetimibe (ZETIA) 10 mg tablet, Take 1 tablet (10 mg total) by mouth daily. (Patient taking differently: Take 10 mg by mouth nightly.   ), Disp: 90 tablet, Rfl: 3  •  ferrous sulfate 325 mg (65 mg iron) tablet, Take 65 mg by mouth every other day.  , Disp: , Rfl:   •  ipratropium (ATROVENT) 0.03 % nasal spray, Administer 0.03 % into affected nostril(s) as needed. 2 sprays by intranasal route 2-3 times every day in each nostril , Disp: , Rfl:   •  LACTOBACILLUS ACIDOPHILUS (PROBIOTIC ORAL), Take by mouth daily., Disp: , Rfl:   •  lisinopril (PRINIVIL) 10 mg tablet, Take 1 tablet (10 mg total) by mouth daily. (Patient taking differently: Take 10 mg by mouth nightly.  ), Disp: 90 tablet, Rfl: 3  •  loperamide (IMODIUM A-D) 2 mg tablet, Take 2 mg by mouth as needed., Disp: , Rfl:   •  metFORMIN (GLUCOPHAGE) 500 mg tablet, Take 500 mg by mouth daily with breakfast.  , Disp: , Rfl:   •  metoprolol succinate XL (TOPROL-XL) 50 mg 24 hr tablet, Take 50 mg by mouth nightly.  , Disp: , Rfl:   •  mupirocin (BACTROBAN) 2 % ointment, Apply 2 % topically as needed.  , Disp: , Rfl:   •  potassium chloride (MICRO-K) 10 mEq CR capsule, Take 10 mEq by mouth every other day.  , Disp: , Rfl:   •  silodosin (RAPAFLO) 8 mg capsule, Take 8 mg by mouth nightly.  , Disp: , Rfl:     ALLERGIES      No Known Allergies    FAMILY HISTORY      Family History   Problem Relation Age of Onset   • Clotting disorder Biological Mother    • Leukemia Biological Father        SOCIAL HISTORY      Social History     Socioeconomic History   • Marital status:      Spouse name: Not on file   • Number of children: Not on file   • Years of education: Not on file   • Highest education level: Not on file   Occupational History   • Not on file   Social Needs   • Financial resource strain: Not on file   • Food insecurity:     Worry: Not on file     Inability: Not on file   • Transportation needs:     Medical: Not on file     Non-medical: Not on file   Tobacco Use   • Smoking status: Never Smoker   • Smokeless tobacco: Never Used   Substance and Sexual Activity   • Alcohol use: Yes      "Frequency: 2-4 times a month     Comment: occassionally/social   • Drug use: Defer   • Sexual activity: Defer   Lifestyle   • Physical activity:     Days per week: Not on file     Minutes per session: Not on file   • Stress: Not on file   Relationships   • Social connections:     Talks on phone: Not on file     Gets together: Not on file     Attends Religion service: Not on file     Active member of club or organization: Not on file     Attends meetings of clubs or organizations: Not on file     Relationship status: Not on file   • Intimate partner violence:     Fear of current or ex partner: Not on file     Emotionally abused: Not on file     Physically abused: Not on file     Forced sexual activity: Not on file   Other Topics Concern   • Not on file   Social History Narrative   • Not on file       REVIEW OF SYSTEMS      Review of Systems   Constitutional: Positive for activity change.   HENT: Negative.    Eyes: Negative.    Respiratory: Negative.    Cardiovascular: Negative.    Gastrointestinal: Negative.    Endocrine: Negative.    Genitourinary: Negative.    Musculoskeletal: Positive for arthralgias.   Allergic/Immunologic: Negative.    Neurological: Negative.    Hematological: Bruises/bleeds easily.   Psychiatric/Behavioral: Negative.        PHYSICAL EXAMINATION      Visit Vitals  BP (!) 157/73 (BP Location: Right upper arm, Patient Position: Sitting)   Pulse 68   Temp 36.2 °C (97.1 °F) (Temporal)   Resp 20   Ht 1.88 m (6' 2\")   Wt 102 kg (224 lb)   SpO2 96%   BMI 28.76 kg/m²     Body mass index is 28.76 kg/m².    Physical Exam   Constitutional: He is oriented to person, place, and time. He appears well-developed and well-nourished.   HENT:   Head: Normocephalic.   Eyes: Pupils are equal, round, and reactive to light.   Neck: Normal range of motion. Neck supple.   Cardiovascular: Normal rate and regular rhythm.   Murmur heard.   Systolic murmur is present with a grade of 3/6.  Murmur radiating to bilateral " carotids    Pulmonary/Chest: Effort normal and breath sounds normal.   Abdominal: Soft. Bowel sounds are normal.   Musculoskeletal: Normal range of motion. He exhibits no edema.   Neurological: He is alert and oriented to person, place, and time.   Skin: Skin is warm and dry.   Psychiatric: He has a normal mood and affect. His behavior is normal. Thought content normal.   Vitals reviewed.       Plan :  EKG completed. Laboratory studies and urine collected as ordered. General instructions and use of [facility] surgical scrub reviewed. Anesthesia consultation completed. [facility] surgical scrub provided.     ROSALIO Kowalski  11/13/2019

## 2019-11-13 NOTE — H&P (VIEW-ONLY)
"   History and Physical  Pre-admission testing         HISTORY OF PRESENT ILLNESS      Cain Pruitt is an 87 y.o. male with a past medical history of CAGB x 3 bypasses with Dr Lei in 1991, DM, HTN, afib, Hyperlipidemia and colon cancer with history of aortic stenosis for several years.  Patient now with SOB/JENKINS ,  Fatigue.  Pateint denies edema, chest pain, palpitations, lightheadedness or dizziness.  Referred to Dr Goldstein for surgical evaluation who recommends TAVR TRANSFEMORAL AORTIC VALVE REPLACEMENT scheduled for 11/27/19.     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    • Colon polyps    • Coronary artery disease 1991    s/p cabg    • Decreased cardiac ejection fraction 1/24/2019   • Edema    • Erectile dysfunction    • GI (gastrointestinal bleed)    • GI (gastrointestinal bleed) 06/2016   • Heart block 1/24/2019   • History of percutaneous coronary intervention 8/12/2019    BMS to SVG to OM 2007   • History of transfusion 2011    during colon resection had \"nicked artery\"- required multiple unitos pf blood    • Hyperlipidemia    • Hypertension    • Hypotension    • SIENA (obstructive sleep apnea) 8/12/2019   • Osteomyelitis of toe of right foot (CMS/[facility]) 2016    s/p 2nd and 3rd toe osteomylitis    • Pacemaker 1/24/2019   • PAD (peripheral artery disease) (CMS/HCC) 8/12/2019   • Peripheral neuropathy     tips of fingers  and feet    • S/P CABG (coronary artery bypass graft) 1991 1991 LIMA to LAD, ARTURO to RDA, SVG to OM   • Sleep apnea    • Syncope     2008,2011   • Type 2 diabetes mellitus (CMS/[facility])     last HgBA1C was 7.2 in 7/2019 managed by PCP Dr Aguilar Phan   • Vitamin D deficiency        Past " Surgical History:   Procedure Laterality Date   • AMPUTATION FOOT / TOE Right 2016    2nd and 3rd right foot from osteomylitis    • BOWEL RESECTION  2011   • CARDIAC PACEMAKER PLACEMENT  2013   • CARDIAC SURGERY     • CARDIOVERSION  2008   • CORONARY ARTERY BYPASS GRAFT  1991    x 4 bypasses    • CORONARY STENT PLACEMENT      3 stents   • KNEE SURGERY Right 1951       PROBLEM LIST     Patient Active Problem List   Diagnosis   • Atrial fibrillation (CMS/HCC)   • Dyslipidemia   • Coronary artery disease involving native coronary artery without angina pectoris   • Nonrheumatic aortic valve stenosis   • Heart block   • Decreased cardiac ejection fraction   • Pacemaker   • S/P CABG (coronary artery bypass graft)   • History of percutaneous coronary intervention   • Cancer of cecum (CMS/HCC)   • SIENA (obstructive sleep apnea)   • GI (gastrointestinal bleed)   • Diabetes mellitus (CMS/HCC)   • PAD (peripheral artery disease) (CMS/HCC)   • Coronary artery disease   • Type 2 diabetes mellitus (CMS/HCC)   • History of transfusion   • Lipid disorder   • Hyperlipidemia   • Osteomyelitis of toe of right foot (CMS/HCC)   • BPH (benign prostatic hyperplasia)   • Cecum cancer (CMS/HCC)   • Peripheral neuropathy   • Syncope   • Arthritis       MEDICATIONS        Current Outpatient Medications:   •  apixaban (ELIQUIS) 5 mg tablet, Take 5 mg by mouth 2 (two) times a day. To stop 2 days pre-op , Disp: , Rfl:   •  bumetanide (BUMEX) 0.5 mg tablet, Take 0.5 mg by mouth every morning.  , Disp: , Rfl:   •  cholecalciferol, vitamin D3, (cholecalciferol) 1,000 unit tablet, Take 1,000 Units by mouth daily.  , Disp: , Rfl:   •  cyanocobalamin (vitamin B-12) 1,000 mcg tablet, Take 1,000 mcg by mouth daily., Disp: , Rfl:   •  dutasteride (AVODART) 0.5 mg capsule, Take 0.5 mg by mouth nightly.  , Disp: , Rfl: 0  •  ezetimibe (ZETIA) 10 mg tablet, Take 1 tablet (10 mg total) by mouth daily. (Patient taking differently: Take 10 mg by mouth nightly.   ), Disp: 90 tablet, Rfl: 3  •  ferrous sulfate 325 mg (65 mg iron) tablet, Take 65 mg by mouth every other day.  , Disp: , Rfl:   •  ipratropium (ATROVENT) 0.03 % nasal spray, Administer 0.03 % into affected nostril(s) as needed. 2 sprays by intranasal route 2-3 times every day in each nostril , Disp: , Rfl:   •  LACTOBACILLUS ACIDOPHILUS (PROBIOTIC ORAL), Take by mouth daily., Disp: , Rfl:   •  lisinopril (PRINIVIL) 10 mg tablet, Take 1 tablet (10 mg total) by mouth daily. (Patient taking differently: Take 10 mg by mouth nightly.  ), Disp: 90 tablet, Rfl: 3  •  loperamide (IMODIUM A-D) 2 mg tablet, Take 2 mg by mouth as needed., Disp: , Rfl:   •  metFORMIN (GLUCOPHAGE) 500 mg tablet, Take 500 mg by mouth daily with breakfast.  , Disp: , Rfl:   •  metoprolol succinate XL (TOPROL-XL) 50 mg 24 hr tablet, Take 50 mg by mouth nightly.  , Disp: , Rfl:   •  mupirocin (BACTROBAN) 2 % ointment, Apply 2 % topically as needed.  , Disp: , Rfl:   •  potassium chloride (MICRO-K) 10 mEq CR capsule, Take 10 mEq by mouth every other day.  , Disp: , Rfl:   •  silodosin (RAPAFLO) 8 mg capsule, Take 8 mg by mouth nightly.  , Disp: , Rfl:     ALLERGIES      No Known Allergies    FAMILY HISTORY      Family History   Problem Relation Age of Onset   • Clotting disorder Biological Mother    • Leukemia Biological Father        SOCIAL HISTORY      Social History     Socioeconomic History   • Marital status:      Spouse name: Not on file   • Number of children: Not on file   • Years of education: Not on file   • Highest education level: Not on file   Occupational History   • Not on file   Social Needs   • Financial resource strain: Not on file   • Food insecurity:     Worry: Not on file     Inability: Not on file   • Transportation needs:     Medical: Not on file     Non-medical: Not on file   Tobacco Use   • Smoking status: Never Smoker   • Smokeless tobacco: Never Used   Substance and Sexual Activity   • Alcohol use: Yes      "Frequency: 2-4 times a month     Comment: occassionally/social   • Drug use: Defer   • Sexual activity: Defer   Lifestyle   • Physical activity:     Days per week: Not on file     Minutes per session: Not on file   • Stress: Not on file   Relationships   • Social connections:     Talks on phone: Not on file     Gets together: Not on file     Attends Hinduism service: Not on file     Active member of club or organization: Not on file     Attends meetings of clubs or organizations: Not on file     Relationship status: Not on file   • Intimate partner violence:     Fear of current or ex partner: Not on file     Emotionally abused: Not on file     Physically abused: Not on file     Forced sexual activity: Not on file   Other Topics Concern   • Not on file   Social History Narrative   • Not on file       REVIEW OF SYSTEMS      Review of Systems   Constitutional: Positive for activity change.   HENT: Negative.    Eyes: Negative.    Respiratory: Negative.    Cardiovascular: Negative.    Gastrointestinal: Negative.    Endocrine: Negative.    Genitourinary: Negative.    Musculoskeletal: Positive for arthralgias.   Allergic/Immunologic: Negative.    Neurological: Negative.    Hematological: Bruises/bleeds easily.   Psychiatric/Behavioral: Negative.        PHYSICAL EXAMINATION      Visit Vitals  BP (!) 157/73 (BP Location: Right upper arm, Patient Position: Sitting)   Pulse 68   Temp 36.2 °C (97.1 °F) (Temporal)   Resp 20   Ht 1.88 m (6' 2\")   Wt 102 kg (224 lb)   SpO2 96%   BMI 28.76 kg/m²     Body mass index is 28.76 kg/m².    Physical Exam   Constitutional: He is oriented to person, place, and time. He appears well-developed and well-nourished.   HENT:   Head: Normocephalic.   Eyes: Pupils are equal, round, and reactive to light.   Neck: Normal range of motion. Neck supple.   Cardiovascular: Normal rate and regular rhythm.   Murmur heard.   Systolic murmur is present with a grade of 3/6.  Murmur radiating to bilateral " carotids    Pulmonary/Chest: Effort normal and breath sounds normal.   Abdominal: Soft. Bowel sounds are normal.   Musculoskeletal: Normal range of motion. He exhibits no edema.   Neurological: He is alert and oriented to person, place, and time.   Skin: Skin is warm and dry.   Psychiatric: He has a normal mood and affect. His behavior is normal. Thought content normal.   Vitals reviewed.       Plan :  EKG completed. Laboratory studies and urine collected as ordered. General instructions and use of [facility] surgical scrub reviewed. Anesthesia consultation completed. [facility] surgical scrub provided.     ROSALIO Kowalski  11/13/2019

## 2019-11-13 NOTE — ANESTHESIA PREPROCEDURE EVALUATION
Relevant Problems   CARDIOVASCULAR   (+) Aortic stenosis   (+) Atrial fibrillation (CMS/[facility])   (+) Coronary artery disease   (+) Coronary artery disease involving native coronary artery without angina pectoris   (+) Dyslipidemia   (+) Heart block   (+) Nonrheumatic aortic valve stenosis   (+) Pacemaker      GASTROINTESTINAL   (+) GI (gastrointestinal bleed)      MUSCULOSKELETAL   (+) Arthritis      NEUROLOGY   (+) Peripheral neuropathy      RESPIRATORY SYSTEM   (+) SIENA (obstructive sleep apnea)      URINARY SYSTEM   (+) BPH (benign prostatic hyperplasia)      Other   (+) Diabetes mellitus (CMS/[facility])   (+) Osteomyelitis of toe of right foot (CMS/[facility])   (+) Type 2 diabetes mellitus (CMS/[facility])       ROS/Med Hx     Past Surgical History:   Procedure Laterality Date   • AMPUTATION FOOT / TOE Right 2016    2nd and 3rd right foot from osteomylitis    • BOWEL RESECTION  2011   • CARDIAC PACEMAKER PLACEMENT  2013   • CARDIAC SURGERY     • CARDIOVERSION  2008   • CORONARY ARTERY BYPASS GRAFT  1991    x 4 bypasses    • CORONARY STENT PLACEMENT      3 stents   • KNEE SURGERY Right 1951       Physical Exam    Airway   Mallampati: II   TM distance: >3 FB   Neck ROM: full  Cardiovascular - normal   Rhythm: regular   Rate: normalPulmonary - normal   clear to auscultation  Dental    Teeth Problems: missing        Anesthesia Plan    Plan: MAC     Lines and Monitors: additional IV and arterial line   ASA 4  Blood Products:     Consented to blood products  Anesthetic plan and risks discussed with: patient  Postop Plan:   Patient Disposition: inpatient floor planned admission  Comments:    Plan: Discussed anesthesia in detail with patient, including invasive monitoring and general anesthesia back up. Consent was signed.

## 2019-11-13 NOTE — PRE-PROCEDURE INSTRUCTIONS
1. We will call you between 3 pm and 7 pm on Tuesday November 26, 2019 to determine that arrival time for your procedure. If you do not hear by 6PM. Please call [phone] for arrival time.    2. Please report to Park in [place] / nettie, walk into main lobby and report to the admission desk on first floor on the day of your procedure.   3. Please follow the following fasting guidelines:   FOLLOW Dr SEVILLA's DIET INSTRUCTIONS    4. Early on the morning of the procedure please take your usual dose of the listed medications with a sip of water:  Follow your surgeons instructions on medications.     5. Other Instructions: Follow skin cleansing and nasal ointment  schedule.     6. If you develop a cold, cough, fever, rash, or other symptom prior to the data of the procedure, please report it to your physician immediately.   7. If you need to cancel the procedure for any reason, please contact your physician or call the unit listed above.   8. Make arrangements to have someone drive you home from the procedure. If you have not arranged for transportation home, your surgery may be cancelled.    9. You may not take public transportation unless accompanied by a responsible person.   10. You may not drive a car or operate complex or potentially dangerous machinery for 24 hours following anesthesia and/or sedation.   11. If it is medically necessary for you to have a longer stay, you will be informed as soon as the decision is made.   12. Do not wear or bring anything of value to the hospital including jewelry of any kind. Do not wear make-up or contact lenses. DO bring your glasses and hearing aid.   13. No lotion, creams, powders, or oils on skin the morning of procedure    14. Dress in comfortable clothes.   15.  If instructed, please bring a copy of your Advanced Directive (Living Will/Durable Power of ) on the day of your procedure.      Pre operative instructions given as per protocol.  Form explained by:  ROSALIO Kowalski     I have read and understand the above information. I have had sufficient opportunity to ask questions I might have and they have been answered to my satisfaction. I agree to comply with the Patient Responsibilities listed above and have received a copy of this form.

## 2019-11-15 DIAGNOSIS — Z95.2 S/P TAVR (TRANSCATHETER AORTIC VALVE REPLACEMENT): Primary | ICD-10-CM

## 2019-11-15 LAB — MICROORGANISM SPEC CULT: NORMAL

## 2019-11-17 LAB
BACTERIA UR CULT: ABNORMAL

## 2019-11-18 ENCOUNTER — TELEPHONE (OUTPATIENT)
Dept: CARDIOTHORACIC SURGERY | Facility: CLINIC | Age: 84
End: 2019-11-18

## 2019-11-18 RX ORDER — SULFAMETHOXAZOLE AND TRIMETHOPRIM 800; 160 MG/1; MG/1
1 TABLET ORAL 2 TIMES DAILY
Qty: 6 TABLET | Refills: 0 | Status: SHIPPED | OUTPATIENT
Start: 2019-11-18 | End: 2019-12-17 | Stop reason: ALTCHOICE

## 2019-11-18 NOTE — TELEPHONE ENCOUNTER
Spoke to patient.  He denies dysuria, frequency or urgency.  Will treat with Bactrim DS bid x 3 days.

## 2019-11-26 RX ORDER — EZETIMIBE 10 MG/1
10 TABLET ORAL NIGHTLY
Qty: 90 TABLET | Refills: 3 | Status: SHIPPED | OUTPATIENT
Start: 2019-11-26 | End: 2021-01-10 | Stop reason: SDUPTHER

## 2019-11-27 ENCOUNTER — APPOINTMENT (INPATIENT)
Dept: RADIOLOGY | Facility: HOSPITAL | Age: 84
DRG: 267 | End: 2019-11-27
Attending: THORACIC SURGERY (CARDIOTHORACIC VASCULAR SURGERY)
Payer: MEDICARE

## 2019-11-27 ENCOUNTER — ANESTHESIA (INPATIENT)
Dept: OPERATING ROOM | Facility: HOSPITAL | Age: 84
DRG: 267 | End: 2019-11-27
Payer: MEDICARE

## 2019-11-27 ENCOUNTER — APPOINTMENT (INPATIENT)
Dept: RADIOLOGY | Facility: HOSPITAL | Age: 84
DRG: 267 | End: 2019-11-27
Attending: PHYSICIAN ASSISTANT
Payer: MEDICARE

## 2019-11-27 ENCOUNTER — ANESTHESIA EVENT (INPATIENT)
Dept: OPERATING ROOM | Facility: HOSPITAL | Age: 84
DRG: 267 | End: 2019-11-27
Payer: MEDICARE

## 2019-11-27 ENCOUNTER — APPOINTMENT (INPATIENT)
Dept: CARDIOLOGY | Facility: HOSPITAL | Age: 84
DRG: 267 | End: 2019-11-27
Attending: THORACIC SURGERY (CARDIOTHORACIC VASCULAR SURGERY)
Payer: MEDICARE

## 2019-11-27 ENCOUNTER — HOSPITAL ENCOUNTER (INPATIENT)
Facility: HOSPITAL | Age: 84
LOS: 1 days | Discharge: HOME | DRG: 267 | End: 2019-11-28
Attending: THORACIC SURGERY (CARDIOTHORACIC VASCULAR SURGERY) | Admitting: THORACIC SURGERY (CARDIOTHORACIC VASCULAR SURGERY)
Payer: MEDICARE

## 2019-11-27 DIAGNOSIS — I35.0 NONRHEUMATIC AORTIC VALVE STENOSIS: ICD-10-CM

## 2019-11-27 DIAGNOSIS — I25.10 CORONARY ARTERY DISEASE INVOLVING NATIVE CORONARY ARTERY OF NATIVE HEART WITHOUT ANGINA PECTORIS: Primary | ICD-10-CM

## 2019-11-27 PROBLEM — I50.43 CHF (CONGESTIVE HEART FAILURE), NYHA CLASS III, ACUTE ON CHRONIC, COMBINED (CMS/HCC): Status: ACTIVE | Noted: 2019-11-27

## 2019-11-27 LAB
ANION GAP SERPL CALC-SCNC: 9 MEQ/L (ref 3–15)
AORTIC VALVE VELOCITY TIME INTEGRAL: 30.4 CM
APTT PPP: 37 SEC (ref 23–35)
AV MEAN GRADIENT: 4.5 MMHG
AV PEAK VELOCITY-S: 1.4 M/S
AV VALVE AREA: 2.4 CM2
BASE EXCESS BLDA CALC-SCNC: -2.4 MEQ/L
BASE EXCESS BLDA CALC-SCNC: -3.4 MEQ/L
BUN SERPL-MCNC: 23 MG/DL (ref 8–20)
CA-I BLD-SCNC: 1.11 MMOL/L (ref 1.15–1.27)
CA-I BLD-SCNC: 1.12 MMOL/L (ref 1.15–1.27)
CALCIUM SERPL-MCNC: 8.4 MG/DL (ref 8.9–10.3)
CHLORIDE SERPL-SCNC: 107 MEQ/L (ref 98–109)
CO2 BLDA-SCNC: 22.5 MEQ/L (ref 22–32)
CO2 BLDA-SCNC: 23 MEQ/L (ref 22–32)
CO2 SERPL-SCNC: 21 MEQ/L (ref 22–32)
COHGB MFR BLD: 1.2 % (ref 0–1.5)
CREAT SERPL-MCNC: 1.2 MG/DL
DOP CALC LVOT STROKE VOLUME: 73.05 ML
ERYTHROCYTE [DISTWIDTH] IN BLOOD BY AUTOMATED COUNT: 13.7 % (ref 11.6–14.4)
FIBRINOGEN PPP-MCNC: 313 MG/DL (ref 220–480)
FIO2 ON VENT: 3 %
GFR SERPL CREATININE-BSD FRML MDRD: 57.4 ML/MIN/1.73M*2
GLUCOSE BLD-MCNC: 128 MG/DL (ref 70–99)
GLUCOSE BLD-MCNC: 129 MG/DL (ref 70–99)
GLUCOSE BLD-MCNC: 148 MG/DL (ref 70–99)
GLUCOSE BLDA-MCNC: 129 MG/DL (ref 65–95)
GLUCOSE BLDA-MCNC: 140 MG/DL (ref 65–95)
GLUCOSE SERPL-MCNC: 132 MG/DL (ref 70–99)
HCO3 BLDA-SCNC: 21.4 MEQ/L (ref 21–28)
HCO3 BLDA-SCNC: 22 MEQ/L (ref 21–28)
HCT VFR BLDA CALC: 34 % (ref 42–52)
HCT VFR BLDA CALC: 45 % (ref 36–48)
HCT VFR BLDCO AUTO: 34.8 % (ref 40.1–51)
HGB BLD-MCNC: 11.9 G/DL
HGB BLDA OXIMETRY-MCNC: 12 G/DL (ref 14–17.5)
INHALED O2 CONCENTRATION: ABNORMAL %
INR PPP: 1.3 INR
LACTATE BLDA-SCNC: 1 MMOL/L (ref 0.4–1.6)
LACTATE BLDA-SCNC: 1.3 MMOL/L (ref 0.4–1.6)
LVOT 2D: 2.1 CM
LVOT VTI: 21.1 CM
MAGNESIUM SERPL-MCNC: 1.8 MG/DL (ref 1.8–2.5)
MCH RBC QN AUTO: 32.2 PG (ref 28–33.2)
MCHC RBC AUTO-ENTMCNC: 34.2 G/DL (ref 32.2–36.5)
MCV RBC AUTO: 94.1 FL (ref 83–98)
METHGB BLD-SCNC: 1.1 % (ref 0.4–1.5)
PCO2 BLDA: 34 MM HG (ref 35–48)
PCO2 BLDA: 37 MM HG (ref 35–48)
PDW BLD AUTO: 10.1 FL (ref 9.4–12.4)
PH BLDA: 7.37 [PH] (ref 7.35–7.45)
PH BLDA: 7.41 PH (ref 7.35–7.45)
PHOSPHATE SERPL-MCNC: 3.7 MG/DL (ref 2.4–4.7)
PLATELET # BLD AUTO: 159 K/UL
PO2 BLDA: 198 MM HG (ref 83–100)
PO2 BLDA: 80 MM HG (ref 83–100)
POCT PATIENT TEMPERATURE: 98.6 °F (ref 97–99)
POCT TEST (BLD GAS): ABNORMAL
POCT TEST: ABNORMAL
POTASSIUM BLDA-SCNC: 4.1 MEQ/L (ref 3.4–4.5)
POTASSIUM BLDA-SCNC: 4.4 MEQ/L (ref 3.4–4.5)
POTASSIUM SERPL-SCNC: 4.4 MEQ/L (ref 3.6–5.1)
PROTHROMBIN TIME: 15.7 SEC (ref 12.2–14.5)
RBC # BLD AUTO: 3.7 M/UL (ref 4.5–5.8)
SAO2 % BLDA: 96 % (ref 93–98)
SAO2 % BLDA: 97 % (ref 93–98)
SODIUM BLDA-SCNC: 134 MEQ/L (ref 136–145)
SODIUM BLDA-SCNC: 136 MEQ/L (ref 136–145)
SODIUM SERPL-SCNC: 137 MEQ/L (ref 136–144)
WBC # BLD AUTO: 8.5 K/UL

## 2019-11-27 PROCEDURE — 02RF38Z REPLACEMENT OF AORTIC VALVE WITH ZOOPLASTIC TISSUE, PERCUTANEOUS APPROACH: ICD-10-PCS | Performed by: THORACIC SURGERY (CARDIOTHORACIC VASCULAR SURGERY)

## 2019-11-27 PROCEDURE — C1894 INTRO/SHEATH, NON-LASER: HCPCS | Performed by: THORACIC SURGERY (CARDIOTHORACIC VASCULAR SURGERY)

## 2019-11-27 PROCEDURE — 84100 ASSAY OF PHOSPHORUS: CPT | Performed by: PHYSICIAN ASSISTANT

## 2019-11-27 PROCEDURE — 83735 ASSAY OF MAGNESIUM: CPT | Performed by: PHYSICIAN ASSISTANT

## 2019-11-27 PROCEDURE — 36415 COLL VENOUS BLD VENIPUNCTURE: CPT | Performed by: PHYSICIAN ASSISTANT

## 2019-11-27 PROCEDURE — 71000001 HC PACU PHASE 1 INITIAL 30MIN: Performed by: THORACIC SURGERY (CARDIOTHORACIC VASCULAR SURGERY)

## 2019-11-27 PROCEDURE — 21400000 HC ROOM AND CARE CCU/INTERMEDIATE

## 2019-11-27 PROCEDURE — 36000005 HC OR LEVEL 5 INITIAL 30MIN: Performed by: THORACIC SURGERY (CARDIOTHORACIC VASCULAR SURGERY)

## 2019-11-27 PROCEDURE — 25000000 HC PHARMACY GENERAL: Performed by: THORACIC SURGERY (CARDIOTHORACIC VASCULAR SURGERY)

## 2019-11-27 PROCEDURE — 82803 BLOOD GASES ANY COMBINATION: CPT

## 2019-11-27 PROCEDURE — 84295 ASSAY OF SERUM SODIUM: CPT | Performed by: PHYSICIAN ASSISTANT

## 2019-11-27 PROCEDURE — 25800000 HC PHARMACY IV SOLUTIONS: Performed by: ANESTHESIOLOGY

## 2019-11-27 PROCEDURE — C1769 GUIDE WIRE: HCPCS | Performed by: THORACIC SURGERY (CARDIOTHORACIC VASCULAR SURGERY)

## 2019-11-27 PROCEDURE — 27800000 HC SUPPLY/IMPLANTS: Performed by: THORACIC SURGERY (CARDIOTHORACIC VASCULAR SURGERY)

## 2019-11-27 PROCEDURE — 25000000 HC PHARMACY GENERAL: Performed by: ANESTHESIOLOGY

## 2019-11-27 PROCEDURE — C1725 CATH, TRANSLUMIN NON-LASER: HCPCS | Performed by: THORACIC SURGERY (CARDIOTHORACIC VASCULAR SURGERY)

## 2019-11-27 PROCEDURE — 63600000 HC DRUGS/DETAIL CODE: Performed by: PHYSICIAN ASSISTANT

## 2019-11-27 PROCEDURE — 71000011 HC PACU PHASE 1 EA ADDL MIN: Performed by: THORACIC SURGERY (CARDIOTHORACIC VASCULAR SURGERY)

## 2019-11-27 PROCEDURE — 85347 COAGULATION TIME ACTIVATED: CPT

## 2019-11-27 PROCEDURE — 93306 TTE W/DOPPLER COMPLETE: CPT | Mod: 26 | Performed by: INTERNAL MEDICINE

## 2019-11-27 PROCEDURE — 85384 FIBRINOGEN ACTIVITY: CPT | Performed by: THORACIC SURGERY (CARDIOTHORACIC VASCULAR SURGERY)

## 2019-11-27 PROCEDURE — 93005 ELECTROCARDIOGRAM TRACING: CPT | Performed by: PHYSICIAN ASSISTANT

## 2019-11-27 PROCEDURE — 85730 THROMBOPLASTIN TIME PARTIAL: CPT | Performed by: THORACIC SURGERY (CARDIOTHORACIC VASCULAR SURGERY)

## 2019-11-27 PROCEDURE — 36000015 HC OR LEVEL 5 EA ADDL MIN: Performed by: THORACIC SURGERY (CARDIOTHORACIC VASCULAR SURGERY)

## 2019-11-27 PROCEDURE — 71045 X-RAY EXAM CHEST 1 VIEW: CPT

## 2019-11-27 PROCEDURE — 63700000 HC SELF-ADMINISTRABLE DRUG: Performed by: PHYSICIAN ASSISTANT

## 2019-11-27 PROCEDURE — 25800000 HC PHARMACY IV SOLUTIONS: Performed by: PHYSICIAN ASSISTANT

## 2019-11-27 PROCEDURE — C1760 CLOSURE DEV, VASC: HCPCS | Performed by: THORACIC SURGERY (CARDIOTHORACIC VASCULAR SURGERY)

## 2019-11-27 PROCEDURE — 33361 REPLACE AORTIC VALVE PERQ: CPT | Mod: 62,Q0 | Performed by: INTERNAL MEDICINE

## 2019-11-27 PROCEDURE — 85027 COMPLETE CBC AUTOMATED: CPT | Performed by: PHYSICIAN ASSISTANT

## 2019-11-27 PROCEDURE — 82805 BLOOD GASES W/O2 SATURATION: CPT | Performed by: THORACIC SURGERY (CARDIOTHORACIC VASCULAR SURGERY)

## 2019-11-27 PROCEDURE — 27200000 HC STERILE SUPPLY: Performed by: THORACIC SURGERY (CARDIOTHORACIC VASCULAR SURGERY)

## 2019-11-27 PROCEDURE — 63600000 HC DRUGS/DETAIL CODE: Performed by: ANESTHESIOLOGY

## 2019-11-27 PROCEDURE — 63600105 HC IODINE BASED CONTRAST: Performed by: THORACIC SURGERY (CARDIOTHORACIC VASCULAR SURGERY)

## 2019-11-27 PROCEDURE — 25000000 HC PHARMACY GENERAL: Performed by: PHYSICIAN ASSISTANT

## 2019-11-27 PROCEDURE — 37000002 HC ANESTHESIA MAC: Performed by: THORACIC SURGERY (CARDIOTHORACIC VASCULAR SURGERY)

## 2019-11-27 PROCEDURE — 93306 TTE W/DOPPLER COMPLETE: CPT

## 2019-11-27 PROCEDURE — 85610 PROTHROMBIN TIME: CPT | Performed by: THORACIC SURGERY (CARDIOTHORACIC VASCULAR SURGERY)

## 2019-11-27 PROCEDURE — 33361 REPLACE AORTIC VALVE PERQ: CPT | Mod: 62,Q0 | Performed by: THORACIC SURGERY (CARDIOTHORACIC VASCULAR SURGERY)

## 2019-11-27 DEVICE — DEVICE CLOSURE PERCLOSE-PROGLIDE: Type: IMPLANTABLE DEVICE | Site: GROIN | Status: FUNCTIONAL

## 2019-11-27 DEVICE — VALVE TRANSCATHETER AORTIC 29MM EVOLUT PRO: Type: IMPLANTABLE DEVICE | Site: HEART | Status: FUNCTIONAL

## 2019-11-27 RX ORDER — CHOLECALCIFEROL (VITAMIN D3) 25 MCG
1000 TABLET ORAL DAILY
Status: DISCONTINUED | OUTPATIENT
Start: 2019-11-28 | End: 2019-11-28 | Stop reason: HOSPADM

## 2019-11-27 RX ORDER — METOPROLOL SUCCINATE 50 MG/1
50 TABLET, EXTENDED RELEASE ORAL NIGHTLY
Status: DISCONTINUED | OUTPATIENT
Start: 2019-11-28 | End: 2019-11-28 | Stop reason: HOSPADM

## 2019-11-27 RX ORDER — ACETIC ACID 100 %
LIQUID (ML) MISCELLANEOUS AS NEEDED
Status: DISCONTINUED | OUTPATIENT
Start: 2019-11-27 | End: 2019-11-27 | Stop reason: HOSPADM

## 2019-11-27 RX ORDER — HEPARIN SODIUM 1000 [USP'U]/ML
INJECTION, SOLUTION INTRAVENOUS; SUBCUTANEOUS AS NEEDED
Status: DISCONTINUED | OUTPATIENT
Start: 2019-11-27 | End: 2019-11-27 | Stop reason: SURG

## 2019-11-27 RX ORDER — ACETAMINOPHEN 325 MG/1
650 TABLET ORAL EVERY 4 HOURS PRN
Status: DISCONTINUED | OUTPATIENT
Start: 2019-11-27 | End: 2019-11-28 | Stop reason: HOSPADM

## 2019-11-27 RX ORDER — CEFAZOLIN SODIUM 2 G/50ML
2 SOLUTION INTRAVENOUS
Status: DISCONTINUED | OUTPATIENT
Start: 2019-11-27 | End: 2019-11-27 | Stop reason: HOSPADM

## 2019-11-27 RX ORDER — ASPIRIN 81 MG/1
81 TABLET ORAL DAILY
Status: DISCONTINUED | OUTPATIENT
Start: 2019-11-28 | End: 2019-11-28 | Stop reason: HOSPADM

## 2019-11-27 RX ORDER — LANOLIN ALCOHOL/MO/W.PET/CERES
1000 CREAM (GRAM) TOPICAL DAILY
Status: DISCONTINUED | OUTPATIENT
Start: 2019-11-28 | End: 2019-11-28 | Stop reason: HOSPADM

## 2019-11-27 RX ORDER — CHLORHEXIDINE GLUCONATE ORAL RINSE 1.2 MG/ML
15 SOLUTION DENTAL ONCE
Status: DISCONTINUED | OUTPATIENT
Start: 2019-11-27 | End: 2019-11-27 | Stop reason: HOSPADM

## 2019-11-27 RX ORDER — DEXTROSE 50 % IN WATER (D50W) INTRAVENOUS SYRINGE
10-30 AS NEEDED
Status: DISCONTINUED | OUTPATIENT
Start: 2019-11-27 | End: 2019-11-28 | Stop reason: HOSPADM

## 2019-11-27 RX ORDER — SODIUM CHLORIDE, SODIUM GLUCONATE, SODIUM ACETATE, POTASSIUM CHLORIDE AND MAGNESIUM CHLORIDE 30; 37; 368; 526; 502 MG/100ML; MG/100ML; MG/100ML; MG/100ML; MG/100ML
INJECTION, SOLUTION INTRAVENOUS CONTINUOUS PRN
Status: DISCONTINUED | OUTPATIENT
Start: 2019-11-27 | End: 2019-11-27 | Stop reason: SURG

## 2019-11-27 RX ORDER — SENNOSIDES 8.6 MG/1
1 TABLET ORAL 2 TIMES DAILY
Status: DISCONTINUED | OUTPATIENT
Start: 2019-11-27 | End: 2019-11-28 | Stop reason: HOSPADM

## 2019-11-27 RX ORDER — LANOLIN ALCOHOL/MO/W.PET/CERES
400 CREAM (GRAM) TOPICAL 2 TIMES DAILY
Status: DISCONTINUED | OUTPATIENT
Start: 2019-11-27 | End: 2019-11-28 | Stop reason: HOSPADM

## 2019-11-27 RX ORDER — BISACODYL 10 MG/1
10 SUPPOSITORY RECTAL AS NEEDED
Status: DISCONTINUED | OUTPATIENT
Start: 2019-11-30 | End: 2019-11-28 | Stop reason: HOSPADM

## 2019-11-27 RX ORDER — CEFAZOLIN SODIUM 2 G/50ML
2 SOLUTION INTRAVENOUS
Status: COMPLETED | OUTPATIENT
Start: 2019-11-27 | End: 2019-11-28

## 2019-11-27 RX ORDER — EZETIMIBE 10 MG/1
10 TABLET ORAL NIGHTLY
Status: DISCONTINUED | OUTPATIENT
Start: 2019-11-27 | End: 2019-11-28 | Stop reason: HOSPADM

## 2019-11-27 RX ORDER — CHLORHEXIDINE GLUCONATE ORAL RINSE 1.2 MG/ML
15 SOLUTION DENTAL
Status: DISCONTINUED | OUTPATIENT
Start: 2019-11-28 | End: 2019-11-28 | Stop reason: HOSPADM

## 2019-11-27 RX ORDER — PROPOFOL 10 MG/ML
INJECTION, EMULSION INTRAVENOUS CONTINUOUS PRN
Status: DISCONTINUED | OUTPATIENT
Start: 2019-11-27 | End: 2019-11-27 | Stop reason: SURG

## 2019-11-27 RX ORDER — TAMSULOSIN HYDROCHLORIDE 0.4 MG/1
0.4 CAPSULE ORAL DAILY
Status: DISCONTINUED | OUTPATIENT
Start: 2019-11-28 | End: 2019-11-28 | Stop reason: HOSPADM

## 2019-11-27 RX ORDER — POTASSIUM CHLORIDE 750 MG/1
20 TABLET, FILM COATED, EXTENDED RELEASE ORAL DAILY
Status: DISCONTINUED | OUTPATIENT
Start: 2019-11-28 | End: 2019-11-28 | Stop reason: HOSPADM

## 2019-11-27 RX ORDER — PROTAMINE SULFATE 10 MG/ML
INJECTION, SOLUTION INTRAVENOUS AS NEEDED
Status: DISCONTINUED | OUTPATIENT
Start: 2019-11-27 | End: 2019-11-27 | Stop reason: SURG

## 2019-11-27 RX ORDER — BUMETANIDE 0.5 MG/1
0.5 TABLET ORAL EVERY MORNING
Status: DISCONTINUED | OUTPATIENT
Start: 2019-11-28 | End: 2019-11-28 | Stop reason: HOSPADM

## 2019-11-27 RX ORDER — POTASSIUM CHLORIDE 14.9 MG/ML
20 INJECTION INTRAVENOUS EVERY 8 HOURS PRN
Status: DISCONTINUED | OUTPATIENT
Start: 2019-11-27 | End: 2019-11-28 | Stop reason: HOSPADM

## 2019-11-27 RX ORDER — PANTOPRAZOLE SODIUM 40 MG/1
40 TABLET, DELAYED RELEASE ORAL NIGHTLY PRN
Status: DISCONTINUED | OUTPATIENT
Start: 2019-11-27 | End: 2019-11-27 | Stop reason: HOSPADM

## 2019-11-27 RX ORDER — ONDANSETRON HYDROCHLORIDE 2 MG/ML
4 INJECTION, SOLUTION INTRAVENOUS EVERY 8 HOURS PRN
Status: DISCONTINUED | OUTPATIENT
Start: 2019-11-27 | End: 2019-11-28 | Stop reason: HOSPADM

## 2019-11-27 RX ORDER — DOCUSATE SODIUM 100 MG/1
100 CAPSULE, LIQUID FILLED ORAL 2 TIMES DAILY
Status: DISCONTINUED | OUTPATIENT
Start: 2019-11-27 | End: 2019-11-28 | Stop reason: HOSPADM

## 2019-11-27 RX ORDER — FINASTERIDE 5 MG/1
5 TABLET, FILM COATED ORAL DAILY
Status: DISCONTINUED | OUTPATIENT
Start: 2019-11-28 | End: 2019-11-28 | Stop reason: HOSPADM

## 2019-11-27 RX ORDER — ALUMINUM HYDROXIDE, MAGNESIUM HYDROXIDE, AND SIMETHICONE 1200; 120; 1200 MG/30ML; MG/30ML; MG/30ML
30 SUSPENSION ORAL EVERY 4 HOURS PRN
Status: DISCONTINUED | OUTPATIENT
Start: 2019-11-27 | End: 2019-11-28 | Stop reason: HOSPADM

## 2019-11-27 RX ORDER — ONDANSETRON 4 MG/1
4 TABLET, ORALLY DISINTEGRATING ORAL EVERY 8 HOURS PRN
Status: DISCONTINUED | OUTPATIENT
Start: 2019-11-27 | End: 2019-11-28 | Stop reason: HOSPADM

## 2019-11-27 RX ORDER — FENTANYL CITRATE 50 UG/ML
INJECTION, SOLUTION INTRAMUSCULAR; INTRAVENOUS AS NEEDED
Status: DISCONTINUED | OUTPATIENT
Start: 2019-11-27 | End: 2019-11-27 | Stop reason: SURG

## 2019-11-27 RX ADMIN — CALCIUM CHLORIDE 1 G: 100 INJECTION, SOLUTION INTRAVENOUS at 19:53

## 2019-11-27 RX ADMIN — HEPARIN SODIUM 5000 UNITS: 1000 INJECTION, SOLUTION INTRAVENOUS; SUBCUTANEOUS at 17:10

## 2019-11-27 RX ADMIN — FENTANYL CITRATE 50 MCG: 50 INJECTION, SOLUTION INTRAMUSCULAR; INTRAVENOUS at 17:24

## 2019-11-27 RX ADMIN — PROPOFOL 25 MCG/KG/MIN: 10 INJECTION, EMULSION INTRAVENOUS at 16:37

## 2019-11-27 RX ADMIN — EZETIMIBE 10 MG: 10 TABLET ORAL at 22:49

## 2019-11-27 RX ADMIN — FENTANYL CITRATE 50 MCG: 50 INJECTION, SOLUTION INTRAMUSCULAR; INTRAVENOUS at 17:28

## 2019-11-27 RX ADMIN — DEXMEDETOMIDINE HYDROCHLORIDE 0.5 MCG/KG/HR: 100 INJECTION, SOLUTION INTRAVENOUS at 16:37

## 2019-11-27 RX ADMIN — HEPARIN SODIUM 12000 UNITS: 1000 INJECTION, SOLUTION INTRAVENOUS; SUBCUTANEOUS at 17:00

## 2019-11-27 RX ADMIN — PROTAMINE SULFATE 100 MG: 10 INJECTION, SOLUTION INTRAVENOUS at 17:37

## 2019-11-27 RX ADMIN — CEFAZOLIN 2 G: 10 INJECTION, POWDER, FOR SOLUTION INTRAVENOUS at 22:49

## 2019-11-27 RX ADMIN — MAGNESIUM SULFATE 2 G: 2 INJECTION INTRAVENOUS at 19:48

## 2019-11-27 RX ADMIN — SODIUM CHLORIDE, SODIUM GLUCONATE, SODIUM ACETATE, POTASSIUM CHLORIDE AND MAGNESIUM CHLORIDE: 526; 502; 368; 37; 30 INJECTION, SOLUTION INTRAVENOUS at 16:12

## 2019-11-27 RX ADMIN — Medication 400 MG: at 22:50

## 2019-11-27 ASSESSMENT — COGNITIVE AND FUNCTIONAL STATUS - GENERAL
DRESSING REGULAR LOWER BODY CLOTHING: 3 - A LITTLE
HELP NEEDED FOR BATHING: 3 - A LITTLE
TOILETING: 3 - A LITTLE
DRESSING REGULAR UPPER BODY CLOTHING: 3 - A LITTLE
HELP NEEDED FOR PERSONAL GROOMING: 3 - A LITTLE
EATING MEALS: 4 - NONE

## 2019-11-27 NOTE — Clinical Note
rIM advanced over the wire to the rFA for angiography.  After performed the catheter was removed intact.

## 2019-11-27 NOTE — OP NOTE
[id number]    NAME@ ([id number])    Operative Report    Date  11/27/2019       SURGEON:  Surgeon(s) and Role:  Panel 1:     * Bryant Lei MD - Primary  Panel 2:     * Efe Mejia MD - Primary     * Paul Camargo MD - Fellow    SURGICAL STAFF:  Circulator: Teresa Simmons RN  Physician Assistant: Rayna Caceres PA C  Scrub Person: Tabitha Alfonso  Documenter: Marsha Bui, LUCITA; Chica Roberto RN      PROCEDURE:   Panel 1  TAVR TRANSFEMORAL AORTIC VALVE REPLACEMENT: 71530 (CPT®)  Panel 2  TAVR percutaneous femoral: 38931 (CPT®)   #29 evolute    INTRAOPERATIVE FINDINGS:      ESTIMATED BLOOD LOSS:  20 mL      COMPLICATIONS:  * No complications entered in OR log *      SPECIMENS:  PREOPERATIVE DIAGNOSES:  Patient Active Problem List   Diagnosis   • Atrial fibrillation (CMS/[facility])   • Dyslipidemia   • Coronary artery disease involving native coronary artery without angina pectoris   • Nonrheumatic aortic valve stenosis   • Heart block   • Decreased cardiac ejection fraction   • Pacemaker   • S/P CABG (coronary artery bypass graft)   • History of percutaneous coronary intervention   • Cancer of cecum (CMS/[facility])   • SIENA (obstructive sleep apnea)   • GI (gastrointestinal bleed)   • Diabetes mellitus (CMS/[facility])   • PAD (peripheral artery disease) (CMS/[facility])   • Coronary artery disease   • Type 2 diabetes mellitus (CMS/[facility])   • History of transfusion   • Lipid disorder   • Hyperlipidemia   • Osteomyelitis of toe of right foot (CMS/[facility])   • BPH (benign prostatic hyperplasia)   • Cecum cancer (CMS/[facility])   • Peripheral neuropathy   • Syncope   • Arthritis   • Urinary retention   • Aortic stenosis   • CHF (congestive heart failure), NYHA class III, acute on chronic, combined (CMS/[facility])     Past Medical History:   Diagnosis Date   • A-fib (CMS/[facility])    • Arthritis     b/l knees   • BPH (benign prostatic hyperplasia)    • CAD (coronary artery disease)     4 stents placed since 1991  "  • Cancer of cecum (CMS/HCC) 2011    S/p resection 2011   • Cecum cancer (CMS/HCC)     12/2/2011 no chemo or XRT.    • Colon polyps    • Coronary artery disease 1991    s/p cabg    • Decreased cardiac ejection fraction 1/24/2019   • Edema    • Erectile dysfunction    • GI (gastrointestinal bleed)    • GI (gastrointestinal bleed) 06/2016   • Heart block 1/24/2019   • History of percutaneous coronary intervention 8/12/2019    BMS to SVG to OM 2007   • History of transfusion 2011    during colon resection had \"nicked artery\"- required multiple unitos pf blood    • Hyperlipidemia    • Hypertension    • Hypotension    • SIENA (obstructive sleep apnea) 8/12/2019   • Osteomyelitis of toe of right foot (CMS/HCC) 2016    s/p 2nd and 3rd toe osteomylitis    • Pacemaker 1/24/2019   • PAD (peripheral artery disease) (CMS/HCC) 8/12/2019   • Peripheral neuropathy     tips of fingers  and feet    • S/P CABG (coronary artery bypass graft) 1991 1991 LIMA to LAD, ARTURO to RDA, SVG to OM   • Sleep apnea    • Syncope     2008,2011   • Type 2 diabetes mellitus (CMS/HCC)     last HgBA1C was 7.2 in 7/2019 managed by PCP Dr Aguilar Phan   • Urinary retention     self cath's 3 times per day,    • Vitamin D deficiency        POSTOPERATIVE DIAGNOSES:  Patient Active Problem List   Diagnosis   • Atrial fibrillation (CMS/HCC)   • Dyslipidemia   • Coronary artery disease involving native coronary artery without angina pectoris   • Nonrheumatic aortic valve stenosis   • Heart block   • Decreased cardiac ejection fraction   • Pacemaker   • S/P CABG (coronary artery bypass graft)   • History of percutaneous coronary intervention   • Cancer of cecum (CMS/HCC)   • SIENA (obstructive sleep apnea)   • GI (gastrointestinal bleed)   • Diabetes mellitus (CMS/HCC)   • PAD (peripheral artery disease) (CMS/[facility])   • Coronary artery disease   • Type 2 diabetes mellitus (CMS/HCC)   • History of transfusion   • Lipid disorder   • Hyperlipidemia   • Osteomyelitis " "of toe of right foot (CMS/[facility])   • BPH (benign prostatic hyperplasia)   • Cecum cancer (CMS/[facility])   • Peripheral neuropathy   • Syncope   • Arthritis   • Urinary retention   • Aortic stenosis   • CHF (congestive heart failure), NYHA class III, acute on chronic, combined (CMS/[facility])     Past Medical History:   Diagnosis Date   • A-fib (CMS/[facility])    • Arthritis     b/l knees   • BPH (benign prostatic hyperplasia)    • CAD (coronary artery disease)     4 stents placed since 1991   • Cancer of cecum (CMS/[facility]) 2011    S/p resection 2011   • Cecum cancer (CMS/[facility])     12/2/2011 no chemo or XRT.    • Colon polyps    • Coronary artery disease 1991    s/p cabg    • Decreased cardiac ejection fraction 1/24/2019   • Edema    • Erectile dysfunction    • GI (gastrointestinal bleed)    • GI (gastrointestinal bleed) 06/2016   • Heart block 1/24/2019   • History of percutaneous coronary intervention 8/12/2019    BMS to SVG to OM 2007   • History of transfusion 2011    during colon resection had \"nicked artery\"- required multiple unitos pf blood    • Hyperlipidemia    • Hypertension    • Hypotension    • ISENA (obstructive sleep apnea) 8/12/2019   • Osteomyelitis of toe of right foot (CMS/[facility]) 2016    s/p 2nd and 3rd toe osteomylitis    • Pacemaker 1/24/2019   • PAD (peripheral artery disease) (CMS/[facility]) 8/12/2019   • Peripheral neuropathy     tips of fingers  and feet    • S/P CABG (coronary artery bypass graft) 1991 1991 LIMA to LAD, ARTURO to RDA, SVG to OM   • Sleep apnea    • Syncope     2008,2011   • Type 2 diabetes mellitus (CMS/HCC)     last HgBA1C was 7.2 in 7/2019 managed by PCP Dr Aguilar Phan   • Urinary retention     self cath's 3 times per day,    • Vitamin D deficiency          Procedure    The right and left femoral arteries were approached using ultrasonic guidance with microneedle 5 Niuean radial catheters were placed in each femoral vessel a 6 Niuean sheath was placed in the right femoral vein.    The pigtail catheter " was placed in the non-coronary sinus and ascending aortography was performed for proper valve angle.  The right femoral artery was preclosed using 2 Perclose devices A  sheath was then placed on the right femoral artery a JR4 catheter was placed and a glide was used to cross the valve with a JR4 catheter was placed across the valve and the glide was replaced with a Confida wire.  Over the Confida  wire the left femoral artery was serially dilated to accommodate the Evolute inline sheath. The Evolute Pro loading system was used yo load the valve on the delivery catheter which was then used to deliver the valve. The valve was was advanced around the aortic arch and placed across the aortic annulus proper position.  The valve was unsheahted.  The valve was found to be in good position and was examined with trans-thoracic echo which revealed that the valve was in proper position with no significant paravalvular leak.  The delivery device was removed.  The sheath was removed over a platinum plus wire and the previously placed sutures were tied.  Pigtail was removed and replaced with a Rim catheter which was placed across the aortic bifurcation.  Right ileo-femoral arteriography was performed which revealed adequate flow with no significant stenosis or extravasation noted.  Patient was returned to the recovery room.

## 2019-11-27 NOTE — ANESTHESIA POSTPROCEDURE EVALUATION
Patient: Cain Pruitt    Procedure Summary     Date:  11/27/19 Room / Location:  [facility] OR 15 / [facility] OR    Anesthesia Start:  1612 Anesthesia Stop:  1810    Procedures:       TAVR TRANSFEMORAL AORTIC VALVE REPLACEMENT (N/A )      TAVR percutaneous femoral (N/A ) Diagnosis:       Nonrheumatic aortic valve stenosis      (Nonrheumatic aortic valve stenosis [I35.0])    Surgeon:  Bryant Lei MD; Efe Mejia MD Responsible Provider:  Jerod Herman MD    Anesthesia Type:  MAC ASA Status:  4          Anesthesia Type: MAC  PACU Vitals     No data found in the last 10 encounters.            Anesthesia Post Evaluation    Pain management: adequate  Patient participation: complete - patient participated  Level of consciousness: awake and alert  Cardiovascular status: acceptable  Airway Patency: adequate  Respiratory status: acceptable  Hydration status: acceptable  Anesthetic complications: no

## 2019-11-27 NOTE — Clinical Note
Over the wire.  The pigtail catheter was advanced over the guidewire to the ascending aorta for angiography for TAVr valve placement.

## 2019-11-27 NOTE — Clinical Note
Closure device placed for the left femoral vein. Closure pressure manually applied. Hemostasis achieved. Manual pressure by CT PA until hemostasis occurred.

## 2019-11-27 NOTE — Clinical Note
Once pressure off the b/l groin sites anesthesia to extubate.  Patient to be transferred to bed and taken to PACU accompanied by anesthesia.

## 2019-11-27 NOTE — Clinical Note
Across the Aortic valve.  BAV with 20 mm True Balloon performed X 2 with rBP done by [employer] rep with patient's own internal device.

## 2019-11-27 NOTE — Clinical Note
Over the wire.  Jr4 catheter advanced to the aortic valve over the SSGW which was used to cross the Aortic valve.  The SSGW was removed intact and the Confida wire was advanced into the LV.  The Jr4 catheter was removed intact.

## 2019-11-27 NOTE — Clinical Note
GE DAP of 38757.88 cGy*cm2 converts to 104.29 Gy*cm2. Siemens DAP of uGy*cm2 converts to 102.51 Gy*cm2.

## 2019-11-27 NOTE — Clinical Note
Closure device placed for the left femoral artery. First Closure device used: Perclose. Hemostasis achieved.

## 2019-11-27 NOTE — OR SURGEON
Pre-Procedure patient identification:  I am the primary operating surgeon/proceduralist and I have identified the patient on 11/27/19 at 1:25 PM Bryant Lei MD  Phone Number: [phone]

## 2019-11-27 NOTE — Clinical Note
Closure device placed for the right femoral artery. Third Closure device used: Perclose. Inserted as pre-close and suture completed. Hemostasis achieved. 2 used as first ineffective.

## 2019-11-28 ENCOUNTER — APPOINTMENT (INPATIENT)
Dept: RADIOLOGY | Facility: HOSPITAL | Age: 84
DRG: 267 | End: 2019-11-28
Attending: PHYSICIAN ASSISTANT
Payer: MEDICARE

## 2019-11-28 VITALS
TEMPERATURE: 98.3 F | HEIGHT: 74 IN | BODY MASS INDEX: 27.72 KG/M2 | OXYGEN SATURATION: 96 % | DIASTOLIC BLOOD PRESSURE: 68 MMHG | RESPIRATION RATE: 16 BRPM | SYSTOLIC BLOOD PRESSURE: 154 MMHG | WEIGHT: 216 LBS | HEART RATE: 90 BPM

## 2019-11-28 LAB
ANION GAP SERPL CALC-SCNC: 10 MEQ/L (ref 3–15)
BUN SERPL-MCNC: 20 MG/DL (ref 8–20)
CA-I BLD-SCNC: 1.13 MMOL/L (ref 1.15–1.27)
CALCIUM SERPL-MCNC: 8.7 MG/DL (ref 8.9–10.3)
CHLORIDE SERPL-SCNC: 104 MEQ/L (ref 98–109)
CO2 SERPL-SCNC: 22 MEQ/L (ref 22–32)
CREAT SERPL-MCNC: 1.2 MG/DL
ERYTHROCYTE [DISTWIDTH] IN BLOOD BY AUTOMATED COUNT: 13.7 % (ref 11.6–14.4)
GFR SERPL CREATININE-BSD FRML MDRD: 57.4 ML/MIN/1.73M*2
GLUCOSE BLD-MCNC: 139 MG/DL (ref 70–99)
GLUCOSE BLD-MCNC: 215 MG/DL (ref 70–99)
GLUCOSE SERPL-MCNC: 132 MG/DL (ref 70–99)
HCT VFR BLDCO AUTO: 37.4 % (ref 40.1–51)
HGB BLD-MCNC: 12.6 G/DL
MAGNESIUM SERPL-MCNC: 2 MG/DL (ref 1.8–2.5)
MCH RBC QN AUTO: 31.8 PG (ref 28–33.2)
MCHC RBC AUTO-ENTMCNC: 33.7 G/DL (ref 32.2–36.5)
MCV RBC AUTO: 94.4 FL (ref 83–98)
PDW BLD AUTO: 10.5 FL (ref 9.4–12.4)
PHOSPHATE SERPL-MCNC: 3.9 MG/DL (ref 2.4–4.7)
PLATELET # BLD AUTO: 155 K/UL
POCT TEST: ABNORMAL
POCT TEST: ABNORMAL
POTASSIUM SERPL-SCNC: 4.4 MEQ/L (ref 3.6–5.1)
RBC # BLD AUTO: 3.96 M/UL (ref 4.5–5.8)
SODIUM SERPL-SCNC: 136 MEQ/L (ref 136–144)
WBC # BLD AUTO: 9.66 K/UL

## 2019-11-28 PROCEDURE — 82330 ASSAY OF CALCIUM: CPT | Performed by: PHYSICIAN ASSISTANT

## 2019-11-28 PROCEDURE — 99238 HOSP IP/OBS DSCHRG MGMT 30/<: CPT | Performed by: THORACIC SURGERY (CARDIOTHORACIC VASCULAR SURGERY)

## 2019-11-28 PROCEDURE — 84100 ASSAY OF PHOSPHORUS: CPT | Performed by: PHYSICIAN ASSISTANT

## 2019-11-28 PROCEDURE — 36415 COLL VENOUS BLD VENIPUNCTURE: CPT | Performed by: PHYSICIAN ASSISTANT

## 2019-11-28 PROCEDURE — 63700000 HC SELF-ADMINISTRABLE DRUG: Performed by: PHYSICIAN ASSISTANT

## 2019-11-28 PROCEDURE — 93005 ELECTROCARDIOGRAM TRACING: CPT | Performed by: PHYSICIAN ASSISTANT

## 2019-11-28 PROCEDURE — 85027 COMPLETE CBC AUTOMATED: CPT | Performed by: PHYSICIAN ASSISTANT

## 2019-11-28 PROCEDURE — 83735 ASSAY OF MAGNESIUM: CPT | Performed by: PHYSICIAN ASSISTANT

## 2019-11-28 PROCEDURE — 80048 BASIC METABOLIC PNL TOTAL CA: CPT | Performed by: PHYSICIAN ASSISTANT

## 2019-11-28 PROCEDURE — 63600000 HC DRUGS/DETAIL CODE: Performed by: PHYSICIAN ASSISTANT

## 2019-11-28 PROCEDURE — 71045 X-RAY EXAM CHEST 1 VIEW: CPT

## 2019-11-28 RX ORDER — ASPIRIN 81 MG/1
81 TABLET ORAL DAILY
Qty: 30 TABLET | Refills: 0 | Status: SHIPPED | OUTPATIENT
Start: 2019-11-29

## 2019-11-28 RX ADMIN — VITAMIN D, TAB 1000IU (100/BT) 1000 UNITS: 25 TAB at 09:57

## 2019-11-28 RX ADMIN — CEFAZOLIN 2 G: 10 INJECTION, POWDER, FOR SOLUTION INTRAVENOUS at 06:57

## 2019-11-28 RX ADMIN — TAMSULOSIN HYDROCHLORIDE 0.4 MG: 0.4 CAPSULE ORAL at 09:59

## 2019-11-28 RX ADMIN — DOCUSATE SODIUM 100 MG: 100 CAPSULE, LIQUID FILLED ORAL at 09:59

## 2019-11-28 RX ADMIN — Medication 400 MG: at 09:58

## 2019-11-28 RX ADMIN — BUMETANIDE 0.5 MG: 0.5 TABLET ORAL at 09:57

## 2019-11-28 RX ADMIN — POTASSIUM CHLORIDE 20 MEQ: 750 TABLET, FILM COATED, EXTENDED RELEASE ORAL at 09:59

## 2019-11-28 RX ADMIN — CHLORHEXIDINE GLUCONATE 15 ML: 1.2 RINSE ORAL at 09:57

## 2019-11-28 RX ADMIN — FINASTERIDE 5 MG: 5 TABLET, FILM COATED ORAL at 09:58

## 2019-11-28 RX ADMIN — SENNOSIDES 1 TABLET: 8.6 TABLET, FILM COATED ORAL at 09:58

## 2019-11-28 RX ADMIN — APIXABAN 5 MG: 5 TABLET, FILM COATED ORAL at 09:59

## 2019-11-28 RX ADMIN — Medication 1000 MCG: at 09:58

## 2019-11-28 ASSESSMENT — COGNITIVE AND FUNCTIONAL STATUS - GENERAL
CLIMB 3 TO 5 STEPS WITH RAILING: 1 - TOTAL
STANDING UP FROM CHAIR USING ARMS: 3 - A LITTLE
WALKING IN HOSPITAL ROOM: 3 - A LITTLE
MOVING TO AND FROM BED TO CHAIR: 3 - A LITTLE

## 2019-11-28 NOTE — PERIOPERATIVE NURSING NOTE
CALCIUM AND MAG. REPLACED. RAVEN.  PEDAL PULSES PALPABLE. RAVEN GROIN SITE INTACT. A LINE D/ANA AND NO BLEEDING NO HEMATOMA.100% PACED RHYTHM ON THE MONITOR.

## 2019-11-28 NOTE — DISCHARGE SUMMARY
Cardiac Surgery -  Inpatient Discharge Summary            BRIEF OVERVIEW  Admitting Provider:  H&P Notes 10/29/2019 to 11/28/2019         Date of Service   Author Author Type Status Note Type File Time    11/13/19 2428  Beatrice Preciado CRNP Nurse Practitioner Signed H&P 11/13/19 4189          Attending Provider: Bryant Lei MD Attending phys phone: [phone]  Primary Care Physician at Discharge: Aguilar Phan [phone]    Admission Date: 11/27/2019     Discharge Date: 11/28/2019    Primary Discharge Diagnosis  Nonrheumatic aortic valve stenosis    Secondary Discharge Diagnosis  Active Hospital Problems    Diagnosis Date Noted   • Aortic stenosis 11/27/2019   • CHF (congestive heart failure), NYHA class III, acute on chronic, combined (CMS/[facility]) 11/27/2019   • Hyperlipidemia    • Type 2 diabetes mellitus (CMS/HCC)    • Syncope    • Diabetes mellitus (CMS/HCC) 08/12/2019   • History of percutaneous coronary intervention 08/12/2019   • PAD (peripheral artery disease) (CMS/HCC) 08/12/2019   • SIENA (obstructive sleep apnea) 08/12/2019   • S/P CABG (coronary artery bypass graft) 08/12/2019   • Decreased cardiac ejection fraction 01/24/2019   • Heart block 01/24/2019   • Pacemaker 01/24/2019   • Nonrheumatic aortic valve stenosis 11/14/2018   • Coronary artery disease involving native coronary artery without angina pectoris 03/20/2018   • Dyslipidemia 03/20/2018   • Atrial fibrillation ([facility]) 09/23/2013   • Coronary artery disease 01/01/1991      Resolved Hospital Problems   No resolved problems to display.       DETAILS OF HOSPITAL STAY    Operative Procedures Performed  Procedure(s):  TAVR TRANSFEMORAL AORTIC VALVE REPLACEMENT  TAVR percutaneous femoral    Consults:       Consult Orders During Admission:  IP CONSULT TO NUTRITION SERVICES  IP CONSULT TO CASE MANAGEMENT  IP CONSULT TO CARDIOLOGY         Imaging  X-ray Chest 1 View    Result Date: 11/27/2019  IMPRESSION: Mild prominence of the  pulmonary vasculature. Cardia megaly.     Ultrasound Carotid Bilateral    Result Date: 11/13/2019  IMPRESSION: 1. Right:  No evidence for hemodynamically significant stenosis within the right carotid artery. 2.  Left: Velocities correlating to 50-69% stenosis. 3. Bilateral antegrade flow within the vertebral arteries.      Pertinent radiology and labs reviewed      Presenting Problem/History of Present Illness  Aortic stenosis     Patient is a 87 y.o. male who underwent surgical opinion of his aortic stenosis as an outpatient.   He has been followed by Dr. Bahena for aortic stenosis for years.  He now follows with Dr. Mejia.  He was told he would know when he needed his valve replaced and he states now is that time. He currently complains of not being able to walk further than several feet in the AM and that increases to several yards through out the day.  When pressed he states he does experience JENKINS, SOB with this.  He has had some chest tightness with exertion as well.  He states he usually stops walking prior to having these symptoms.  He rates this as moderate and it is relieved by rest.  Associated symptoms of fatigue.  Pertinent negatives include lLE edema and dizziness.  PMH:  CABG 1991, Afib, PPM, s/p cecal resection for CA, osteomyelitis with amputated toes in 2017 & 2018, NIDDM    Exam on Day of Discharge    Physical Exam   Constitutional: He is oriented to person, place, and time. He appears well-developed and well-nourished.   HENT:   Head: Normocephalic.   Eyes: Pupils are equal, round, and reactive to light.   Neck: Normal range of motion.   Cardiovascular: Normal rate.   Pulmonary/Chest: Effort normal.   Abdominal: Soft.   Musculoskeletal: Normal range of motion. He exhibits no edema.   Neurological: He is alert and oriented to person, place, and time.   Skin: Skin is warm.   Psychiatric: He has a normal mood and affect.         Hospital Course  Cain Pruitt is a 87 y.o. cis gender male who  underwent RIGHT TRANSFEMORAL TAVR #29 EVOLUT MEDTRONIC VALVE with Bryant Lei MD on 11/27/2019. Postoperatively patient was hemodynamically stable . Post op echo will be completed as an outpatient. Patient did not require TVP post op and remained in preoperative rhythm.. Patient was resumed on all home medications at time of discharge. 1 month post op appointment and TTE scheduled for patient. On day of DC pt denied cp, sob, orthopnea, and activity intolerance. Pt was hemodynamically stable. DC instructions were reviewed with pt and prescriptions were given. Pt was able to teach back instructions for medication regimen and understands the need to follow make appropriate follow up schedule per DC instructions       PCP:Aguilar Phan MD   Cardiology:Dr. Bahena        Assessment and Plan  No new Assessment & Plan notes have been filed under this hospital service since the last note was generated.  Service: Cardiac Surgery        Discharge Orders  Discharge Orders Needing Review     Order Details Provider Order Origin    bumetanide (BUMEX) 0.5 mg tablet Take 0.5 mg by mouth every morning.   Chandrakant Bahena MD Prior to Admission    cholecalciferol, vitamin D3, (cholecalciferol) 1,000 unit tablet Take 1,000 Units by mouth daily.   Chandrakant Bahena MD Prior to Admission    cyanocobalamin (vitamin B-12) 1,000 mcg tablet Take 1,000 mcg by mouth daily. Chandrakant Bahena MD Prior to Admission    dutasteride (AVODART) 0.5 mg capsule Take 0.5 mg by mouth nightly.   Chandrakant Bahena MD Prior to Admission    ezetimibe (ZETIA) 10 mg tablet Take 1 tablet (10 mg total) by mouth nightly. Bridget Caceres CRNP Prior to Admission    ferrous sulfate 325 mg (65 mg iron) tablet Take 65 mg by mouth every other day.   Chandrakant Bahena MD Prior to Admission    ipratropium (ATROVENT) 0.03 % nasal spray Administer 0.03 % into affected nostril(s) as needed. 2 sprays by intranasal route 2-3 times every day in each nostril  Beltran  Iglesia DE LOS SANTOS MD Prior to Admission    LACTOBACILLUS ACIDOPHILUS (PROBIOTIC ORAL) Take by mouth daily. Chandrakant Bahena MD Prior to Admission    metFORMIN (GLUCOPHAGE) 500 mg tablet Take 500 mg by mouth daily with breakfast.   Eleanor Suarez MD Prior to Admission    metoprolol succinate XL (TOPROL-XL) 50 mg 24 hr tablet Take 50 mg by mouth nightly.   Chandrakant Bahena MD Prior to Admission    mupirocin (BACTROBAN) 2 % ointment Apply 2 % topically as needed.   Iglesia Gong MD Prior to Admission    potassium chloride (MICRO-K) 10 mEq CR capsule Take 10 mEq by mouth every other day.   Chandrakant Bahena MD Prior to Admission    silodosin (RAPAFLO) 8 mg capsule Take 8 mg by mouth nightly.   Chandrakant Bahena MD Prior to Admission    acetaminophen (TYLENOL) tablet 650 mg 650 mg, oral, Every 4 hours PRN, pain, fever, oral temp > 100.4°F or rectal temp > 101.4°F, Starting Wed 11/27/19 at 2200, For 90 daysMay be given with NSAIDs/opioids.  Max acetaminophen 3000 mg/day Rayna Caceres PA C Inpatient    alum-mag hydroxide-simeth (MAALOX) 200-200-20 mg/5 mL suspension 30 mL 30 mL, oral, Every 4 hours PRN, indigestion, Starting Wed 11/27/19 at 2200, For 90 days** Shake well before administration ** Rayna Caceres PA C Inpatient    apixaban (ELIQUIS) 5 mg tablet Take 5 mg by mouth 2 (two) times a day. To stop 2 days pre-op  Chandrakant Bahena MD Prior to Admission    apixaban (ELIQUIS) tablet 5 mg 5 mg, oral, 2 times daily, First dose on Wed 11/27/19 at 2300Indications: Prevent Thromboembolism in Chronic Atrial Fibrillation Rayna Caceres PA C Inpatient    aspirin enteric coated tablet 81 mg 81 mg, oral, Daily, First dose on Thu 11/28/19 at 0900Hold for platelet count < 50,000 ** Do not crush, chew, or angeles **  Rayna Caceres PA C Inpatient    bisacodyl (DULCOLAX) 10 mg suppository 10 mg 10 mg, rectal, As needed, constipation, x 1 dose on POD# 3 if patient has not moved bowels, Starting Sat 11/30/19 at 0000, For 1  dose Rayna Caceres PA C Inpatient    bumetanide (BUMEX) tablet 0.5 mg 0.5 mg, oral, Every morning, First dose on Thu 11/28/19 at 0900 Rayna Caceres PA C Inpatient    calcium chloride 1 g in sodium chloride 0.9 % 50 mL IVPB 1 g, intravenous, at 100 mL/hr, Administer over 30 Minutes, Every 6 hours PRN, ionized Ca less than 1.15 mmol/L, Starting Wed 11/27/19 at 1835** CENTRAL LINE PREFERRED ** AVOID MIDLINE ** Rayna Caceres PA C Inpatient    ceFAZolin (ANCEF) IVPB 2 g/50 mL D5W 2 g, intravenous, at 100 mL/hr, Administer over 30 Minutes, Every 8 hours interval, First dose on Wed 11/27/19 at 2300, For 2 dosesDuplex bag - activate before hanging. Indications: Prevention of Perioperative Infection Rayna Caceres PA C Inpatient    chlorhexidine (PERIDEX) 0.12 % mouthwash 15 mL 15 mL, Mouth/Throat, User specified (2 times per day), First dose on Thu 11/28/19 at 1000 Rayna Caceres PA C Inpatient    cholecalciferol (vitamin D3) tablet 1,000 Units 1,000 Units, oral, Daily, First dose on Thu 11/28/19 at 68196,000 units is equal to 25 mcg. Rayna Caceres PA C Inpatient    cyanocobalamin (VITAMIN B12) tablet 1,000 mcg 1,000 mcg, oral, Daily, First dose on Thu 11/28/19 at 0900 Rayna Caceres PA C Inpatient    dextrose in water injection 5-15 g 5-15 g (10-30 mL), intravenous, As needed, low blood sugar, To be used only with the software-calculated insulin infusion., Starting Wed 11/27/19 at 2200If glucose 70-79 mg/dL Give 10 mL IV push; If glucose 60-69 mg/dL Give 15 mL IV push; If glucose 50-59 mg/dL Give 20 mL IV push; If glucose 30-49 mg/dL Give 25 mL IV push; If glucose under 30 mg/dL Give 30 mL IV push Rayna Caceres PA C Inpatient    docusate sodium (COLACE) capsule 100 mg 100 mg, oral, 2 times daily, First dose on Wed 11/27/19 at 2300, For 90 daysHold for diarrhea Rayna Caceres PA C Inpatient    ezetimibe (ZETIA) tablet 10 mg 10 mg, oral, Nightly, First dose on Wed 11/27/19 at 2300  "Rayna Caceres PA C Inpatient    finasteride (PROSCAR) tablet 5 mg 5 mg, oral, Daily, First dose on Thu 11/28/19 at 0900 Rayna Caceres PA C Inpatient    insulin regular (HumuLIN R,NovoLIN R) 100 Units in sodium chloride 0.9 % 100 mL (1 Units/mL) infusion 0-15 Units/hr (0-15 mL/hr), intravenous, at 0-15 mL/hr, Titrated, Starting Wed 11/27/19 at 2300, For 7 daysInsulin Infusion Rate is calculated using the below formula (in event of downtime, the same formula is used):  BG minus 60 x the multiplier (multiplier starts at 0.02)   If BG is in target range, do not change the multiplier.  If BG is less than target minimum, decrease the multiplier by 0.005.  If multiplier is less than 0.01, decrease the multiplier by 0.0025.  If BG is less than 80, rate = 0.  If BG is greater than target maximum, increase the multiplier by 0.005.  If current BG value is 15% or more of a drop from prior BG value, the multiplier stays the same. (maximum multiplier value is 0.075)  If multiplier is less than 0.01, the increase will only be in 0.0025 increments.    If glucose 70-79 mg/dL Give 10 mL dextrose 50 % IV push; If glucose 60-69 mg/dL Give 15 mL dextrose 50 % IV push; If glucose 50-59 mg/dL Give 20 mL dextrose 50 % IV push; If glucose 30-49 mg/dL Give 25 mL dextrose 50 % IV push; If glucose under 30 mg/dL Give 30 mL dextrose 50 % mL IV push.  During downtime revert to last multiplier and calculate using \"Blood Glucose - 60 x last multiplier.\" Notify covering physician within the hour if this occurs.  Notify physician if: (a) Blood glucose is less than 70 mg/dL for two consecutive blood glucose measurements (b) Blood glucose reverts back to levels greater than 300 mg/dL for two consecutive blood glucose measurements (c) Insulin requirement exceeding 24 units per hour does not result in a lower blood glucose level or if the infusion rate (mL/hr) drops to less than 0.5 units/hr (d) Patient's K+ level drops to less than 4 (e) " Continuous enteral feedings, TPN, or IV insulin infusion is stopped or interrupted. Desired glycemic target: 140-180 Standard Glycemic Target Prime tubing with 20 mL insulin infusion prior to use.Desired glycemic target: 110-150 Cardiothoracic Glycemic Target Rayna Caceres PA C Inpatient    lisinopril (PRINIVIL) 10 mg tablet Take 1 tablet (10 mg total) by mouth daily. Bridget Caceres CRNP Prior to Admission     Patient taking differently:  Take 10 mg by mouth nightly.        loperamide (IMODIUM A-D) 2 mg tablet Take 2 mg by mouth as needed. Chandrakant Bahena MD Prior to Admission    magnesium oxide (MAG-OX) tablet 400 mg 400 mg, oral, 2 times daily, First dose on Wed 11/27/19 at 2000, For 90 daysHold if Magnesium level >= 2.8 mg/dL and notify physician Rayna Caceres PA C Inpatient    magnesium sulfate IVPB 2g in 50 mL NSS/D5W/SWFI 2 g, intravenous, at 25 mL/hr, Administer over 120 Minutes, As needed, if magnesium level < 2.2 mg/dL, Starting Wed 11/27/19 at 1835 Rayna Caceres PA C Inpatient    metoprolol succinate XL (TOPROL-XL) 24 hr ER tablet 50 mg 50 mg, oral, Nightly, First dose on Thu 11/28/19 at 2200Hold if SBP less than 100; HR less than 60 ** Do not crush, chew, or angeles **  Rayna Caceres PA C Inpatient    ondansetron (ZOFRAN) injection 4 mg 4 mg, intravenous, Every 8 hours PRN, nausea, vomiting, Nausea/Vomiting, Starting Wed 11/27/19 at 2200, For 90 daysIf unable to take orally. Rayna Caceres PA C Inpatient    ondansetron ODT (ZOFRAN-ODT) disintegrating tablet 4 mg 4 mg, oral, Every 8 hours PRN, nausea, vomiting, Nausea/Vomiting, Starting Wed 11/27/19 at 2200, For 90 days Rayna Caceres PA C Inpatient    potassium chloride (KLOR-CON) tablet extended release 20 mEq 20 mEq, oral, Daily, First dose on Thu 11/28/19 at 0900, For 2 days** Do not crush, chew, or angeles **  Rayna Caceres PA C Inpatient    potassium chloride 20 mEq in 100 mL IVPB  (premix) 20 mEq, intravenous, at  50 mL/hr, Administer over 120 Minutes, Every 8 hours PRN, < 4.5 mEq/l target 4.5-5, Starting Wed 11/27/19 at 1835, For 90 daysICU Only.  Central Line Only.  Target 4.5-5 mEq/L. Draw serum K one hour before.  Repeat up to 3 times, and notify physician if K continues < 4.5 mEq/L. ** CENTRAL LINE PREFERRED ** Rayna Caceres PA C Inpatient    senna (SENOKOT) tablet 1 tablet 1 tablet, oral, 2 times daily, First dose on Wed 11/27/19 at 2300, For 90 daysHold for diarrhea Rayna Caceres PA C Inpatient    tamsulosin (FLOMAX) 24 hr ER capsule 0.4 mg 0.4 mg, oral, Daily, First dose on Thu 11/28/19 at 0900** Do not crush, chew, or angeles **  Rayna Caceres PA C Inpatient          Outpatient Follow-Ups            In 2 weeks [facility]    In 2 weeks Bryant Lei MD [facility] Cardio Surgery at [facility]        Referrals:  No orders of the defined types were placed in this encounter.            Test Results Pending at Discharge  Unresulted Labs (From admission, onward)     Start     Ordered    11/27/19 1838  CBC  Once      11/27/19 1839    11/27/19 1836  Phosphorus  Daily     Start Status     11/28/19 0600 Needs to be Collected Order ID: [id number]   11/29/19 0600 Scheduled        11/27/19 1835    11/27/19 1836  Magnesium  Daily     Start Status     11/28/19 0600 Needs to be Collected Order ID: [id number]   11/29/19 0600 Scheduled        11/27/19 1835    11/27/19 1836  Basic metabolic panel  Daily     Start Status     11/28/19 0600 Needs to be Collected Order ID: [id number]   11/29/19 0600 Scheduled        11/27/19 1835    11/27/19 1836  CBC  Daily     Start Status     11/28/19 0600 Needs to be Collected Order ID: [id number]   11/29/19 0600 Scheduled        11/27/19 1835    11/27/19 1836  Calcium, ionized  Daily     Start Status     11/28/19 0600 Needs to be Collected Order ID: [id number]   11/29/19 0600 Scheduled        11/27/19 1835                    Discharge  Disposition  Home   Code Status at Discharge: Full Code  Physician Order for Life-Sustaining Treatment Document Status      No documents found                ROSALIO Altamirano  11/28/2019

## 2019-11-28 NOTE — NURSING NOTE
Pt tele monitor removed. Peripheral IV's discontinued. Pt's daughter at bedside. Pt taking shower and eating lunch before discharged. Pt discharge instructions given. Pt and daughter confirmed understanding. Pt given TAVR card. Pt discharged via wheelchair to [facility]

## 2019-11-28 NOTE — PROGRESS NOTES
Cardiology Daily Progress Note    1. Subjective   Feels well. No CP and no SOB.     MEDS I reviewed all current medications as listed in the EMR record     Objective       Intake/Output Summary (Last 24 hours) at 11/28/2019 0923  Last data filed at 11/28/2019 0500  Gross per 24 hour   Intake 1069.31 ml   Output 570 ml   Net 499.31 ml     Intake/Output this shift:  No intake/output data recorded.    Telemetry  V paced     2. Vital signs in last 24 hours:  Temp:  [36.1 °C (97 °F)-36.8 °C (98.3 °F)] 36.8 °C (98.3 °F)  Heart Rate:  [60-75] 66  Resp:  [7-43] 16  BP: (121-168)/(58-84) 148/71    3. Physical Exam:  General Appearance:  Alert, no distress   Head:  Normocephalic, without obvious abnormality, atraumatic   Eyes:  Conjunctiva/corneas clear, EOM's intact   Oral: MMM   Lungs:   Clear to auscultation bilaterally, respirations unlabored, no rales, no wheezing   Heart:  Regular rhythm, soft systolic murmur consistent with post valve replacement    Abdomen:   Soft, non-tender, no masses, no organomegaly   Vascular: Pulses 2+ and symmetric all extremities, no carotid bruit or jugular vein distention   Musculoskeletal:  Skin: No injury or deformity  Skin color, texture, turgor normal, no rashes or lesions, no cyanosis or edema   Extremities: Extremities normal, atraumatic   Behavior/Emotional: Appropriate, cooperative     4. Labs  Lab Results   Component Value Date    WBC 9.66 11/28/2019    HGB 12.6 (L) 11/28/2019    HCT 37.4 (L) 11/28/2019     11/28/2019    CHOL 183 12/20/2017    TRIG 248 (H) 12/20/2017    HDL 42 (L) 12/20/2017    ALT 24 11/13/2019    AST 20 11/13/2019     11/28/2019    K 4.4 11/28/2019     11/28/2019    CREATININE 1.2 11/28/2019    BUN 20 11/28/2019    CO2 22 11/28/2019    TSH 2.00 07/23/2016    INR 1.3 11/27/2019     I personally reviewed all labs mild anemia    5. Assessment/Plan     1. AS s/p TAVR: 29mm Evolut transcatheter aortic valve replacement. Continue plan as per CT  surgery regarding pain control and discharge. Continue ASA and BB    2. CAD: pre TAVR cath showed patent LIMA to LAD and diagonal , 60% mid circumflex , and patent SVG to OM with a patent stent mid vessel and patent ARTURO to PDA. Continue ASA and BB. Continue zetia    3. Ischemic Cardiomyopathy: moderately reduced function on most recent echo. Continue BB and bumex. Resume lisinopril.     4. SSS: s/p PPM : followed by EP     5. HTN: elevated this AM pre meds. Trend BP today.     6. Chronic AF: continue to monitor on tele . Continue Eliquis and toprol.     Aguilar Estrada MD  11/28/2019

## 2019-11-28 NOTE — PLAN OF CARE
Problem: Adult Inpatient Plan of Care  Goal: Plan of Care Review  Outcome: Progressing  Flowsheets (Taken 11/28/2019 0517)  Progress: no change  Plan of Care Reviewed With: patient  Outcome Summary: Surgical sites clean & dry, no hematoma

## 2019-11-29 LAB
P AXIS: -26
POCT ACT-HR: 161 SEC (ref 100–140)
POCT ACT-HR: 163 SEC (ref 100–140)
POCT ACT-HR: 243 SEC (ref 100–140)
POCT ACT-HR: 283 SEC (ref 100–140)
POCT TEST: ABNORMAL
QRS DURATION: 192
QRS DURATION: 204
QT INTERVAL: 498
QT INTERVAL: 502
QTC CALCULATION(BAZETT): 513
QTC CALCULATION(BAZETT): 530
R AXIS: -78
R AXIS: -82
T WAVE AXIS: 100
T WAVE AXIS: 97
VENTRICULAR RATE: 64
VENTRICULAR RATE: 67

## 2019-11-29 PROCEDURE — 93010 ELECTROCARDIOGRAM REPORT: CPT | Mod: 76 | Performed by: INTERNAL MEDICINE

## 2019-12-01 LAB
CROSSMATCH: NORMAL
ISBT CODE: 6200
PRODUCT CODE: NORMAL
PRODUCT STATUS: NORMAL
SPECIMEN EXP DATE BLD: NORMAL
UNIT ABO: NORMAL
UNIT ID: NORMAL
UNIT RH: POSITIVE

## 2019-12-04 PROBLEM — Z95.2 S/P TAVR (TRANSCATHETER AORTIC VALVE REPLACEMENT): Status: ACTIVE | Noted: 2019-12-04

## 2019-12-11 ENCOUNTER — TELEPHONE (OUTPATIENT)
Dept: SCHEDULING | Facility: CLINIC | Age: 84
End: 2019-12-11

## 2019-12-11 NOTE — TELEPHONE ENCOUNTER
FELICIA for patient stating the same that he needs to contact his urologist as he will be prone to UTI's on going due to his urological history.

## 2019-12-11 NOTE — TELEPHONE ENCOUNTER
Pt of Dr Lei    Pt is calling in today to request oral abx for possible UTI.     Pt reports to tx w/ Bactrim prior to surgical procedure for TAVR on 11/27/19. This medication was ordered as pre-op discovery by ROSALIO Garcia    Pt is now reporting to sx that include burnig w/ urination and persistent frequency  Pt does self-catheterize at home 3xday for hx of urinary retention  Pt reports that he is meticulous in his technique.    Pt was directed to contact PCP or Urologist for w/u to r/o UTI. Pt was not satisfied w/ this direction and feels an antibiotic should be ordered by his CT surgeon due to his recent TAVR and the risks related to same    The pt can be reached at [phone] if you have different thoughts or reccomendations

## 2019-12-17 ENCOUNTER — OFFICE VISIT (OUTPATIENT)
Dept: CARDIOTHORACIC SURGERY | Facility: CLINIC | Age: 84
End: 2019-12-17
Payer: MEDICARE

## 2019-12-17 ENCOUNTER — HOSPITAL ENCOUNTER (OUTPATIENT)
Dept: CARDIOLOGY | Facility: HOSPITAL | Age: 84
Discharge: HOME | End: 2019-12-17
Attending: NURSE PRACTITIONER
Payer: MEDICARE

## 2019-12-17 VITALS
WEIGHT: 223 LBS | RESPIRATION RATE: 16 BRPM | TEMPERATURE: 97.6 F | SYSTOLIC BLOOD PRESSURE: 130 MMHG | OXYGEN SATURATION: 98 % | HEIGHT: 74 IN | HEART RATE: 96 BPM | DIASTOLIC BLOOD PRESSURE: 72 MMHG | BODY MASS INDEX: 28.62 KG/M2

## 2019-12-17 VITALS
BODY MASS INDEX: 27.72 KG/M2 | HEIGHT: 74 IN | DIASTOLIC BLOOD PRESSURE: 60 MMHG | WEIGHT: 216 LBS | SYSTOLIC BLOOD PRESSURE: 95 MMHG | HEART RATE: 63 BPM

## 2019-12-17 DIAGNOSIS — Z95.2 S/P TAVR (TRANSCATHETER AORTIC VALVE REPLACEMENT): Primary | ICD-10-CM

## 2019-12-17 DIAGNOSIS — Z95.2 S/P TAVR (TRANSCATHETER AORTIC VALVE REPLACEMENT): ICD-10-CM

## 2019-12-17 LAB
AORTIC ROOT ANNULUS - M-MODE: 3.16 CM
AORTIC VALVE AREA: 0.75 SQUARE CENTIMETERS PER SQUARE METER
AORTIC VALVE AT: 77.35 MS
AORTIC VALVE MEAN VELOCITY: 1.38 M/S
AORTIC VALVE VELOCITY TIME INTEGRAL: 36.5 CM
AV MEAN GRADIENT: 8 MMHG
AV PEAK GRADIENT: 15.84 MMHG
AV PEAK VELOCITY-S: 1.99 M/S
AV REGURGITATION PRESSURE HALF TIME: 391.68 MS
AV VALVE AREA: 1.6 CM2
BSA FOR ECHO PROCEDURE: 2.26 M2
CUSP SEPARATION: 1.68 CM
DOP CALC LVOT STROKE VOLUME: 56.02 ML
DOP CALC RVOT VTI: 17.33 CM
E/E' RATIO: 17.92
E/LAT E' RATIO: 11.02
EDV (BP): 91.37 ML
EF (A4C): 56.37 %
EF A2C: 58.11 %
EJECTION FRACTION: 56.98 %
ESV (BP): 39.31 ML
INTERVENTRICULAR SEPTUM: 1.03 CM
LA ESV (BP): 63.42 ML
LA ESV INDEX (A2C): 30.93 ML/M2
LA ESV INDEX (BP): 28.06 ML/M2
LA/AORTA RATIO: 1.62
LAAS-AP2: 23.95 CM2
LAAS-AP4: 22.13 CM2
LAD 2D - M-MODE: 5.12 CM
LAV-S: 69.9 ML
LEFT ATRIUM VOLUME INDEX: 23.45 ML/M2
LEFT ATRIUM VOLUME: 53 ML
LEFT INTERNAL DIMENSION IN SYSTOLE: 4.32 CM (ref 3.98–6.03)
LEFT VENTRICLE DIASTOLIC VOLUME INDEX: 41.68 ML/M2
LEFT VENTRICLE DIASTOLIC VOLUME: 94.2 ML
LEFT VENTRICLE SYSTOLIC VOLUME INDEX: 18.19 ML/M2
LEFT VENTRICLE SYSTOLIC VOLUME: 41.1 ML
LEFT VENTRICULAR INTERNAL DIMENSION IN DIASTOLE: 5.94 CM (ref 6.86–9.53)
LEFT VENTRICULAR MASS: 1.33 G/CM
LEFT VENTRICULAR MASS: 250.26 G
LEFT VENTRICULAR POSTERIOR WALL IN END DIASTOLE: 1.02 CM (ref 0.83–1.54)
LV DIASTOLIC VOLUME: 88.8 ML
LV ESV (APICAL 2 CHAMBER): 37.2 ML
LVCI: 1.53 LITERS PER MINUTE PER SQUARE METER
LVCO: 3.44 LITERS PER MINUTE
LVEDVI(A2C): 39.29 ML/M2
LVEDVI(BP): 40.43 ML/M2
LVESVI(A2C): 16.46 ML/M2
LVESVI(BP): 17.39 ML/M2
LVOT 2D: 1.93 CM
LVOT A: 2.93 CM2
LVOT MG: 2.69 MMHG
LVOT MV: 0.77 M/S
LVOT PEAK VELOCITY: 1.1 M/S
LVOT PG: 4.8 MMHG
LVOT VTI: 19.16 CM
MV E'TISSUE VEL-LAT: 0.09 M/S
MV E'TISSUE VEL-MED: 0.06 M/S
MV PEAK E VEL: 1 M/S
POSTERIOR WALL: 1.04 CM
RVOT VTI: 17.3 CM
TR MAX PG: 37.72 MMHG
TRICUSPID VALVE PEAK REGURGITATION VELOCITY: 2.86 M/S
Z-SCORE OF LEFT VENTRICULAR DIMENSION IN END DIASTOLE: -3.08
Z-SCORE OF LEFT VENTRICULAR DIMENSION IN END SYSTOLE: -0.99
Z-SCORE OF LEFT VENTRICULAR POSTERIOR WALL IN END DIASTOLE: -0.52

## 2019-12-17 PROCEDURE — 99213 OFFICE O/P EST LOW 20 MIN: CPT | Performed by: THORACIC SURGERY (CARDIOTHORACIC VASCULAR SURGERY)

## 2019-12-17 PROCEDURE — 93306 TTE W/DOPPLER COMPLETE: CPT

## 2019-12-17 PROCEDURE — 93306 TTE W/DOPPLER COMPLETE: CPT | Mod: 26 | Performed by: INTERNAL MEDICINE

## 2019-12-17 ASSESSMENT — ENCOUNTER SYMPTOMS
BACK PAIN: 0
AGITATION: 0
WEAKNESS: 0
SHORTNESS OF BREATH: 0
LIGHT-HEADEDNESS: 0
DIARRHEA: 0
RHINORRHEA: 0
ARTHRALGIAS: 0
MYALGIAS: 0
WOUND: 0
ACTIVITY CHANGE: 0
PALPITATIONS: 0
BRUISES/BLEEDS EASILY: 1
FATIGUE: 0
COUGH: 0
DIZZINESS: 0
CONFUSION: 0
FREQUENCY: 0
DIFFICULTY URINATING: 0
CHEST TIGHTNESS: 0
CONSTIPATION: 0

## 2019-12-17 NOTE — PROGRESS NOTES
"  Cardiac Surgery    HPI  Patient is a 87 y.o. male who presents one month postoperative TF TAVR with a #29 Evolut Pro.  His cardiologist is Dr Mejia. He has had a noticeable improvement in his JENKINS. He complains of nothing.    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    • Colon polyps    • Coronary artery disease 1991    s/p cabg    • Decreased cardiac ejection fraction 1/24/2019   • Edema    • Erectile dysfunction    • GI (gastrointestinal bleed)    • GI (gastrointestinal bleed) 06/2016   • Heart block 1/24/2019   • History of percutaneous coronary intervention 8/12/2019    BMS to SVG to OM 2007   • History of transfusion 2011    during colon resection had \"nicked artery\"- required multiple unitos pf blood    • Hyperlipidemia    • Hypertension    • Hypotension    • SIENA (obstructive sleep apnea) 8/12/2019   • Osteomyelitis of toe of right foot (CMS/[facility]) 2016    s/p 2nd and 3rd toe osteomylitis    • Pacemaker 1/24/2019   • PAD (peripheral artery disease) (CMS/[facility]) 8/12/2019   • Peripheral neuropathy     tips of fingers  and feet    • S/P CABG (coronary artery bypass graft) 1991 1991 LIMA to LAD, ARTURO to RDA, SVG to OM   • 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    Transfemoral TAVR #29 Evolut Pro on 11-   • Sleep apnea    • Syncope     2008,2011   • Type 2 diabetes mellitus (CMS/[facility])     last HgBA1C was 7.2 in 7/2019 managed by PCP Dr Aguilar Phan   • Urinary retention     self cath's 3 times per day,    • Vitamin D deficiency        Surgical History:   Past Surgical History:   Procedure Laterality Date   • AMPUTATION FOOT / TOE Right 2016    2nd and 3rd right foot from osteomylitis    • BOWEL RESECTION  " 2011   • CARDIAC PACEMAKER PLACEMENT  2013   • CARDIAC SURGERY     • CARDIOVERSION  2008   • CORONARY ARTERY BYPASS GRAFT  1991    x 4 bypasses    • CORONARY STENT PLACEMENT      3 stents   • KNEE SURGERY Right 1951       Allergies: Patient has no known allergies.    Current Outpatient Medications   Medication Sig Dispense Refill   • apixaban (ELIQUIS) 5 mg tablet Take 5 mg by mouth 2 (two) times a day. To stop 2 days pre-op      • aspirin 81 mg enteric coated tablet Take 1 tablet (81 mg total) by mouth daily. 30 tablet 0   • bumetanide (BUMEX) 0.5 mg tablet Take 0.5 mg by mouth every morning.       • cholecalciferol, vitamin D3, (cholecalciferol) 1,000 unit tablet Take 1,000 Units by mouth daily.       • cyanocobalamin (vitamin B-12) 1,000 mcg tablet Take 1,000 mcg by mouth daily.     • dutasteride (AVODART) 0.5 mg capsule Take 0.5 mg by mouth nightly.    0   • ezetimibe (ZETIA) 10 mg tablet Take 1 tablet (10 mg total) by mouth nightly. 90 tablet 3   • ferrous sulfate 325 mg (65 mg iron) tablet Take 65 mg by mouth every other day.       • ipratropium (ATROVENT) 0.03 % nasal spray Administer 0.03 % into affected nostril(s) as needed. 2 sprays by intranasal route 2-3 times every day in each nostril      • LACTOBACILLUS ACIDOPHILUS (PROBIOTIC ORAL) Take by mouth daily.     • lisinopril (PRINIVIL) 10 mg tablet Take 1 tablet (10 mg total) by mouth daily. (Patient taking differently: Take 10 mg by mouth nightly.  ) 90 tablet 3   • metFORMIN (GLUCOPHAGE) 500 mg tablet Take 500 mg by mouth daily with breakfast.       • metoprolol succinate XL (TOPROL-XL) 50 mg 24 hr tablet Take 50 mg by mouth nightly.       • potassium chloride (MICRO-K) 10 mEq CR capsule Take 10 mEq by mouth every other day.       • silodosin (RAPAFLO) 8 mg capsule Take 8 mg by mouth nightly.         No current facility-administered medications for this visit.        Social History:   Social History     Socioeconomic History   • Marital status:       Spouse name: None   • Number of children: None   • Years of education: None   • Highest education level: None   Occupational History   • None   Social Needs   • Financial resource strain: None   • Food insecurity:     Worry: None     Inability: None   • Transportation needs:     Medical: None     Non-medical: None   Tobacco Use   • Smoking status: Never Smoker   • Smokeless tobacco: Never Used   Substance and Sexual Activity   • Alcohol use: Yes     Frequency: 2-4 times a month     Comment: occassionally/social   • Drug use: Defer   • Sexual activity: Defer   Lifestyle   • Physical activity:     Days per week: None     Minutes per session: None   • Stress: None   Relationships   • Social connections:     Talks on phone: None     Gets together: None     Attends Yazdanism service: None     Active member of club or organization: None     Attends meetings of clubs or organizations: None     Relationship status: None   • Intimate partner violence:     Fear of current or ex partner: None     Emotionally abused: None     Physically abused: None     Forced sexual activity: None   Other Topics Concern   • None   Social History Narrative   • None       Family History:   Family History   Problem Relation Age of Onset   • Clotting disorder Biological Mother    • Leukemia Biological Father      Review of Systems   Constitutional: Negative for activity change and fatigue.   HENT: Negative for congestion, postnasal drip and rhinorrhea.    Eyes: Negative for visual disturbance.   Respiratory: Negative for cough, chest tightness and shortness of breath.    Cardiovascular: Negative for chest pain, palpitations and leg swelling.   Gastrointestinal: Negative for constipation and diarrhea.   Genitourinary: Negative for difficulty urinating, frequency and urgency.   Musculoskeletal: Negative for arthralgias, back pain and myalgias.   Skin: Negative for rash and wound.   Neurological: Negative for dizziness, syncope, weakness and  "light-headedness.   Hematological: Bruises/bleeds easily.   Psychiatric/Behavioral: Negative for agitation, behavioral problems and confusion.   All other systems reviewed and are negative.    Objective   Vital Signs for the last 24 hours:  Visit Vitals  /72 (BP Location: Left upper arm, Patient Position: Sitting)   Pulse 96   Temp 36.4 °C (97.6 °F) (Oral)   Resp 16   Ht 1.88 m (6' 2\")   Wt 101 kg (223 lb)   SpO2 98%   BMI 28.63 kg/m²     Physical Exam   Constitutional: He is oriented to person, place, and time. He appears well-developed and well-nourished. No distress.   Cardiovascular: Normal rate.   Pulmonary/Chest: Effort normal and breath sounds normal. No respiratory distress. He has no rales.   Abdominal: Soft. Bowel sounds are normal.   Musculoskeletal: Normal range of motion. He exhibits no edema.   Neurological: He is alert and oriented to person, place, and time.   Skin: Skin is warm and dry. Capillary refill takes 2 to 3 seconds.   Psychiatric: He has a normal mood and affect. His behavior is normal. Judgment and thought content normal.   Vitals reviewed.    Assessment/Plan   Assessment:  S/P TF TAVR  Echo reviewed by Dr. Lei and Dr Wall.  The most recent echocardiogram shows normal function of the prosthetic valve.  Mean gradient 8 mmHg,  EF 45%.  Mild paravalvular AI.  [facility] I.  Patient remains on daily diuretics.  There have been no signs or symptoms of congestive heart failure.  No chest discomfort, syncopal, or presyncopal events.  The patient is tolerating medical therapy.  The patient was instructed regarding appropriate antibiotic prophylaxis.  We recommend continuing to monitor the valve by serial echocardiograms.  Plan:  Ongoing medical management with cardiology.  No physical limitations or restrictions at this time. He will follow up with an echo and Dr Mejia.    ISharmin CRNP, am scribing for and in the presence of ROSALIO Jiménez  "

## 2019-12-19 NOTE — PROGRESS NOTES
I, Bryant Lei MD, personally performed the services described in this documentation as scribed by ROSALIO Zamora in my presence, and it is both accurate and complete.    12/19/2019 12:35 PM

## 2020-02-05 PROBLEM — Z95.2 S/P TAVR (TRANSCATHETER AORTIC VALVE REPLACEMENT): Chronic | Status: ACTIVE | Noted: 2019-12-04

## 2020-02-05 PROBLEM — Z95.1 S/P CABG (CORONARY ARTERY BYPASS GRAFT): Chronic | Status: ACTIVE | Noted: 2019-08-12

## 2020-02-05 PROBLEM — I35.0 AORTIC STENOSIS: Status: RESOLVED | Noted: 2019-11-27 | Resolved: 2020-02-05

## 2020-02-11 ENCOUNTER — OFFICE VISIT (OUTPATIENT)
Dept: CARDIOLOGY | Facility: CLINIC | Age: 85
End: 2020-02-11
Payer: MEDICARE

## 2020-02-11 VITALS
SYSTOLIC BLOOD PRESSURE: 142 MMHG | DIASTOLIC BLOOD PRESSURE: 80 MMHG | RESPIRATION RATE: 16 BRPM | BODY MASS INDEX: 29.22 KG/M2 | WEIGHT: 227.7 LBS | HEIGHT: 74 IN | HEART RATE: 68 BPM

## 2020-02-11 DIAGNOSIS — I48.91 ATRIAL FIBRILLATION, UNSPECIFIED TYPE (CMS/HCC): Chronic | ICD-10-CM

## 2020-02-11 DIAGNOSIS — Z95.2 S/P TAVR (TRANSCATHETER AORTIC VALVE REPLACEMENT): Primary | Chronic | ICD-10-CM

## 2020-02-11 DIAGNOSIS — R93.1 DECREASED CARDIAC EJECTION FRACTION: ICD-10-CM

## 2020-02-11 DIAGNOSIS — Z95.1 S/P CABG (CORONARY ARTERY BYPASS GRAFT): Chronic | ICD-10-CM

## 2020-02-11 PROCEDURE — 93000 ELECTROCARDIOGRAM COMPLETE: CPT | Performed by: INTERNAL MEDICINE

## 2020-02-11 PROCEDURE — 99215 OFFICE O/P EST HI 40 MIN: CPT | Performed by: INTERNAL MEDICINE

## 2020-02-11 ASSESSMENT — ENCOUNTER SYMPTOMS
NEAR-SYNCOPE: 0
DYSURIA: 0
ORTHOPNEA: 0
PSYCHIATRIC NEGATIVE: 1
SYNCOPE: 0
BLURRED VISION: 0
IRREGULAR HEARTBEAT: 0
CONSTIPATION: 0
DIARRHEA: 0
ABDOMINAL PAIN: 0
DIZZINESS: 0
DYSPNEA ON EXERTION: 0
DECREASED APPETITE: 0
HEADACHES: 0
FLANK PAIN: 0
SHORTNESS OF BREATH: 0
HEARTBURN: 0
DOUBLE VISION: 0
ENDOCRINE NEGATIVE: 1
BACK PAIN: 1
WEIGHT GAIN: 0
ALLERGIC/IMMUNOLOGIC NEGATIVE: 1
POLYDIPSIA: 0

## 2020-02-11 NOTE — LETTER
February 11, 2020     Aguilar Phan MD  [address] [facility] [address]  [place] [address]    Patient: Cain Pruitt  YOB: 1932  Date of Visit: 2/11/2020      Dear Dr. Phan:    Thank you for referring Cain Pruitt to me for evaluation. Below are my notes for this consultation.    If you have questions, please do not hesitate to call me. I look forward to following your patient along with you.         Sincerely,        Efe Mejia MD        CC: MD Bryant Andre MD Coady, Paul M, MD  2/11/2020  3:11 PM  Sign at close encounter      Cardiology Consult     Reason for visit: Status post transcatheter aortic valve replacement    Chief Complaint   Patient presents with   • Hospital Follow Up     TAVR- 12/17/2019   Ischemic cardiomyopathy  Status post coronary bypass graft surgery  Status post percutaneous coronary intervention  Status post permanent pacemaker implantation  Critical calcific aortic stenosis status post transcatheter aortic valve replacement  Peripheral vascular disease status post lower extremity digit amputation.     HPI     Cain Pruitt is a 87 y.o. male who presents for follow-up of transcatheter aortic valve intervention for severe calcific aortic stenosis performed November 27, 2019.  His postprocedural and hospital course was uncomplicated.  Since discharge, he is made a steady and very positive recovery with improved exercise tolerance, less dyspnea and no symptoms of dizziness, lightheadedness or angina.  He is not had fever.  No chills.  He denies neurologic symptoms.  Appetite is been good.  Weight stable.    He did have one episode of epistaxis that was prolonged requiring ENT evaluation.  His aspirin dose has been adjusted and he continues on Eliquis without further problem.  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    • Colon polyps    • Coronary artery disease 1991    s/p cabg    • Decreased cardiac ejection fraction 1/24/2019   • Edema    • Erectile dysfunction    • GI (gastrointestinal bleed)    • GI (gastrointestinal bleed) 06/2016   • Heart block 1/24/2019   • History of percutaneous coronary intervention 8/12/2019    BMS to SVG to OM 2007   • History of transfusion 2011    during colon resection had \"nicked artery\"- required multiple unitos pf blood    • Hyperlipidemia    • Hypertension    • Hypotension    • SIENA (obstructive sleep apnea) 8/12/2019   • Osteomyelitis of toe of right foot (CMS/[facility]) 2016    s/p 2nd and 3rd toe osteomylitis    • Pacemaker 1/24/2019   • PAD (peripheral artery disease) (CMS/HCC) 8/12/2019   • Peripheral neuropathy     tips of fingers  and feet    • S/P CABG (coronary artery bypass graft) 1991 1991 LIMA to LAD, ARTURO to RDA, SVG to OM   • 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    Transfemoral TAVR #29 Evolut Pro on 11-   • Sleep apnea    • Syncope     2008,2011   • Type 2 diabetes mellitus (CMS/HCC)     last HgBA1C was 7.2 in 7/2019 managed by PCP Dr Aguilar Phan   • Urinary retention     self cath's 3 times per day,    • Vitamin D deficiency      Past Surgical History:   Procedure Laterality Date   • AMPUTATION FOOT / TOE Right 2016    2nd and 3rd right foot from osteomylitis    • BOWEL RESECTION  2011   • CARDIAC PACEMAKER PLACEMENT  2013   • CARDIAC SURGERY     • CARDIOVERSION  2008   • CORONARY ARTERY BYPASS GRAFT  1991    x 4 bypasses    • CORONARY STENT PLACEMENT      3 stents   • KNEE SURGERY Right 1951     Patient has no known allergies.  Current Outpatient Medications   Medication Sig Dispense Refill   • apixaban (ELIQUIS) 5 mg tablet Take 5 mg by mouth 2 (two) times a day. To stop 2 days pre-op      • aspirin 81 mg enteric " coated tablet Take 1 tablet (81 mg total) by mouth daily. (Patient taking differently: Take 81 mg by mouth every other day.  ) 30 tablet 0   • bumetanide (BUMEX) 0.5 mg tablet Take 0.5 mg by mouth every morning.       • cholecalciferol, vitamin D3, (cholecalciferol) 1,000 unit tablet Take 1,000 Units by mouth daily.       • cyanocobalamin (vitamin B-12) 1,000 mcg tablet Take 1,000 mcg by mouth daily.     • dutasteride (AVODART) 0.5 mg capsule Take 0.5 mg by mouth nightly.    0   • ezetimibe (ZETIA) 10 mg tablet Take 1 tablet (10 mg total) by mouth nightly. 90 tablet 3   • ferrous sulfate 325 mg (65 mg iron) tablet Take 65 mg by mouth every other day.       • ipratropium (ATROVENT) 0.03 % nasal spray Administer 0.03 % into affected nostril(s) as needed. 2 sprays by intranasal route 2-3 times every day in each nostril      • LACTOBACILLUS ACIDOPHILUS (PROBIOTIC ORAL) Take by mouth daily.     • lisinopril (PRINIVIL) 10 mg tablet Take 1 tablet (10 mg total) by mouth daily. (Patient taking differently: Take 10 mg by mouth nightly.  ) 90 tablet 3   • metFORMIN (GLUCOPHAGE) 500 mg tablet Take 500 mg by mouth daily with breakfast.       • metoprolol succinate XL (TOPROL-XL) 50 mg 24 hr tablet Take 50 mg by mouth nightly.       • potassium chloride (MICRO-K) 10 mEq CR capsule Take 10 mEq by mouth every other day.       • silodosin (RAPAFLO) 8 mg capsule Take 8 mg by mouth nightly.         No current facility-administered medications for this visit.      Social History     Socioeconomic History   • Marital status:      Spouse name: None   • Number of children: None   • Years of education: None   • Highest education level: None   Occupational History   • None   Social Needs   • Financial resource strain: None   • Food insecurity:     Worry: None     Inability: None   • Transportation needs:     Medical: None     Non-medical: None   Tobacco Use   • Smoking status: Never Smoker   • Smokeless tobacco: Never Used    Substance and Sexual Activity   • Alcohol use: Yes     Frequency: 2-4 times a month     Comment: occassionally/social   • Drug use: Defer   • Sexual activity: Defer   Lifestyle   • Physical activity:     Days per week: None     Minutes per session: None   • Stress: None   Relationships   • Social connections:     Talks on phone: None     Gets together: None     Attends Spiritism service: None     Active member of club or organization: None     Attends meetings of clubs or organizations: None     Relationship status: None   • Intimate partner violence:     Fear of current or ex partner: None     Emotionally abused: None     Physically abused: None     Forced sexual activity: None   Other Topics Concern   • None   Social History Narrative   • None     Family History   Problem Relation Age of Onset   • Clotting disorder Biological Mother    • Leukemia Biological Father         Review of Systems   Constitution: Negative for decreased appetite and weight gain.   Eyes: Negative for blurred vision and double vision.   Cardiovascular: Positive for leg swelling. Negative for chest pain, dyspnea on exertion, irregular heartbeat, near-syncope, orthopnea and syncope.   Respiratory: Negative for shortness of breath.    Endocrine: Negative.  Negative for polydipsia.   Hematologic/Lymphatic: Positive for bleeding problem.   Skin: Negative for itching and rash.   Musculoskeletal: Positive for back pain and joint pain. Negative for muscle weakness.   Gastrointestinal: Negative for abdominal pain, constipation, diarrhea and heartburn.   Genitourinary: Negative for dysuria and flank pain.   Neurological: Negative for dizziness and headaches.   Psychiatric/Behavioral: Negative.    Allergic/Immunologic: Negative.    All other systems reviewed and are negative.     Objective   Vitals:    02/11/20 1441   BP: (!) 142/80   Pulse: 68   Resp: 16   Weight 227.7 pounds    Physical Exam   Constitutional: He is oriented to person, place, and  time. He appears well-nourished. No distress.   Utilizes cane for stability of gait   HENT:   Head: Atraumatic.   Nose: Nose normal.   Eyes: Conjunctivae are normal. No scleral icterus.   Neck: No JVD present.   Cardiovascular: Normal rate.   Murmur (Soft ejection systolic murmur.  No diastolic murmur) heard.  Irregular rhythm   Pulmonary/Chest: Effort normal and breath sounds normal. He has no wheezes.   Abdominal: Soft. Bowel sounds are normal. There is no tenderness. There is no rebound.   Musculoskeletal: He exhibits edema. He exhibits no tenderness.   Neurological: He is oriented to person, place, and time.   Skin: Skin is dry. No rash noted.   Psychiatric: He has a normal mood and affect. His behavior is normal. Judgment and thought content normal.   Nursing note and vitals reviewed.      Labs   Lab Results   Component Value Date    WBC 9.66 11/28/2019    HGB 12.6 (L) 11/28/2019    HCT 37.4 (L) 11/28/2019     11/28/2019    CHOL 183 12/20/2017    TRIG 248 (H) 12/20/2017    HDL 42 (L) 12/20/2017    ALT 24 11/13/2019    AST 20 11/13/2019     11/28/2019    K 4.4 11/28/2019     11/28/2019    CREATININE 1.2 11/28/2019    BUN 20 11/28/2019    CO2 22 11/28/2019    TSH 2.00 07/23/2016    INR 1.3 11/27/2019     Imaging:  Echocardiogram December 17, 2019/post TAVR    Interpretation Summary     S/p transfemoral TAVR (#29 evolut) on 11/27/2019 at [facility]. Patient is in afib and V paced.     1. Left ventricle: Mildy dilated left ventricle. LVEDD 5.9 cm. Mild concentric left ventricular hypertrophy. Mildy decreased systolic function with estimated ejection fraction 40-45%.  Mild global hypokinesis. Septal wall motion consistent with RV pacing. Impaired LV relaxation consistent with at least grade I diastolic dysfunction.     2. Aortic valve and root: s/p TAVR 29mm evolut with peak aortic velocity 2 m/s. Mean gradient 8 mmHg.(apical) . At least mild paravalvular aortic valve regurgitation.  No  transvalvular regurgitation.   The aortic root size is normal. The proximal ascending aorta is not well visualized.     3. Mitral valve: Mitral valve is sclerotic.. Mild mitral regurgitation.     4. Atria: Mildy dilated left atrium. Mildy dilated right atrium     5. Right Ventricle: RV mildly dilated with mildly reduced RV systolic function..     6. Tricuspid and Pulmonic valve: Tricuspid valve is thin and opens well. Mild tricuspid regurgitation with estimated right ventricular systolic pressure of 35-40 mmHg. Pulmonary valve is not well visualized. No pulmonary regurgitation.     7. IVC: IVC is normal in size with >50% collapse with respiration. There is hepatic systolic flow reversal.     8. Effusions:  No pericardial effusion.     9. Compared with 11/27/2019 echo, no major change.        ECG  Electronic ventricular pacemaker        Assessment plan:    1.  Critical calcific aortic stenosis status post transcatheter aortic valve replacement.  Doing very well status post intervention.  The echo from last month was reviewed in detail.  I encouraged him to continue to take advantage of his improved tolerance for activity and exercise is much as possible.    2.  Ischemic cardiomyopathy.  Status post percutaneous coronary intervention as well as coronary bypass graft surgery.  Ejection fraction remains stable.  No symptoms of congestive heart failure or angina.  I did talk with him in detail about salt limitation in his diet.  No changes in medications at this time.    3.  Sick sinus syndrome.  History of paroxysmal atrial fibrillation status post pacemaker therapy.  Remains anticoagulated.  By history he has a Biotronix pacemaking device in place.  We will coordinate with his electrophysiologist management of the pacemaker at the time of his valve implantation.    4.  Systemic hypertension.  Blood pressure adequately controlled at this time.    5.  History of colon resection for cecal cancer..  Additional history of  colonic polyposis which is followed clinically.    6.  Diabetes mellitus.  Managed by his family physician.    7.  Peripheral vascular disease status post lower extremity digit amputation.    In summary he is doing very well in this early time after his transcatheter invention.  No limits placed on his activity.  He will follow-up in the surgical office with an echo in 4 months and a follow-up in this office will be in 6 months.  He is to call with any questions or problems.     Efe Mejia MD  2/11/2020

## 2020-02-11 NOTE — PROGRESS NOTES
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     HPI     Cain Pruitt is a 87 y.o. male who presents for follow-up of transcatheter aortic valve intervention for severe calcific aortic stenosis performed November 27, 2019.  His postprocedural and hospital course was uncomplicated.  Since discharge, he is made a steady and very positive recovery with improved exercise tolerance, less dyspnea and no symptoms of dizziness, lightheadedness or angina.  He is not had fever.  No chills.  He denies neurologic symptoms.  Appetite is been good.  Weight stable.    He did have one episode of epistaxis that was prolonged requiring ENT evaluation.  His aspirin dose has been adjusted and he continues on Eliquis without further problem.  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    • Colon polyps    • Coronary artery disease 1991    s/p cabg    • Decreased cardiac ejection fraction 1/24/2019   • Edema    • Erectile dysfunction    • GI (gastrointestinal bleed)    • GI (gastrointestinal bleed) 06/2016   • Heart block 1/24/2019   • History of percutaneous coronary intervention 8/12/2019    BMS to SVG to OM 2007   • History of transfusion 2011    during colon resection had \"nicked artery\"- required multiple unitos pf " blood    • Hyperlipidemia    • Hypertension    • Hypotension    • SIENA (obstructive sleep apnea) 8/12/2019   • Osteomyelitis of toe of right foot (CMS/HCC) 2016    s/p 2nd and 3rd toe osteomylitis    • Pacemaker 1/24/2019   • PAD (peripheral artery disease) (CMS/[facility]) 8/12/2019   • Peripheral neuropathy     tips of fingers  and feet    • S/P CABG (coronary artery bypass graft) 1991 1991 LIMA to LAD, ARTURO to RDA, SVG to OM   • 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    Transfemoral TAVR #29 Evolut Pro on 11-   • Sleep apnea    • Syncope     2008,2011   • Type 2 diabetes mellitus (CMS/HCC)     last HgBA1C was 7.2 in 7/2019 managed by PCP Dr Aguilar Phan   • Urinary retention     self cath's 3 times per day,    • Vitamin D deficiency      Past Surgical History:   Procedure Laterality Date   • AMPUTATION FOOT / TOE Right 2016    2nd and 3rd right foot from osteomylitis    • BOWEL RESECTION  2011   • CARDIAC PACEMAKER PLACEMENT  2013   • CARDIAC SURGERY     • CARDIOVERSION  2008   • CORONARY ARTERY BYPASS GRAFT  1991    x 4 bypasses    • CORONARY STENT PLACEMENT      3 stents   • KNEE SURGERY Right 1951     Patient has no known allergies.  Current Outpatient Medications   Medication Sig Dispense Refill   • apixaban (ELIQUIS) 5 mg tablet Take 5 mg by mouth 2 (two) times a day. To stop 2 days pre-op      • aspirin 81 mg enteric coated tablet Take 1 tablet (81 mg total) by mouth daily. (Patient taking differently: Take 81 mg by mouth every other day.  ) 30 tablet 0   • bumetanide (BUMEX) 0.5 mg tablet Take 0.5 mg by mouth every morning.       • cholecalciferol, vitamin D3, (cholecalciferol) 1,000 unit tablet Take 1,000 Units by mouth daily.       • cyanocobalamin (vitamin B-12) 1,000 mcg tablet Take 1,000 mcg by mouth daily.     • dutasteride (AVODART) 0.5 mg capsule Take 0.5 mg by mouth nightly.    0   • ezetimibe (ZETIA) 10 mg tablet Take 1 tablet (10 mg total) by mouth nightly. 90 tablet 3    • ferrous sulfate 325 mg (65 mg iron) tablet Take 65 mg by mouth every other day.       • ipratropium (ATROVENT) 0.03 % nasal spray Administer 0.03 % into affected nostril(s) as needed. 2 sprays by intranasal route 2-3 times every day in each nostril      • LACTOBACILLUS ACIDOPHILUS (PROBIOTIC ORAL) Take by mouth daily.     • lisinopril (PRINIVIL) 10 mg tablet Take 1 tablet (10 mg total) by mouth daily. (Patient taking differently: Take 10 mg by mouth nightly.  ) 90 tablet 3   • metFORMIN (GLUCOPHAGE) 500 mg tablet Take 500 mg by mouth daily with breakfast.       • metoprolol succinate XL (TOPROL-XL) 50 mg 24 hr tablet Take 50 mg by mouth nightly.       • potassium chloride (MICRO-K) 10 mEq CR capsule Take 10 mEq by mouth every other day.       • silodosin (RAPAFLO) 8 mg capsule Take 8 mg by mouth nightly.         No current facility-administered medications for this visit.      Social History     Socioeconomic History   • Marital status:      Spouse name: None   • Number of children: None   • Years of education: None   • Highest education level: None   Occupational History   • None   Social Needs   • Financial resource strain: None   • Food insecurity:     Worry: None     Inability: None   • Transportation needs:     Medical: None     Non-medical: None   Tobacco Use   • Smoking status: Never Smoker   • Smokeless tobacco: Never Used   Substance and Sexual Activity   • Alcohol use: Yes     Frequency: 2-4 times a month     Comment: occassionally/social   • Drug use: Defer   • Sexual activity: Defer   Lifestyle   • Physical activity:     Days per week: None     Minutes per session: None   • Stress: None   Relationships   • Social connections:     Talks on phone: None     Gets together: None     Attends Religion service: None     Active member of club or organization: None     Attends meetings of clubs or organizations: None     Relationship status: None   • Intimate partner violence:     Fear of current or ex  partner: None     Emotionally abused: None     Physically abused: None     Forced sexual activity: None   Other Topics Concern   • None   Social History Narrative   • None     Family History   Problem Relation Age of Onset   • Clotting disorder Biological Mother    • Leukemia Biological Father         Review of Systems   Constitution: Negative for decreased appetite and weight gain.   Eyes: Negative for blurred vision and double vision.   Cardiovascular: Positive for leg swelling. Negative for chest pain, dyspnea on exertion, irregular heartbeat, near-syncope, orthopnea and syncope.   Respiratory: Negative for shortness of breath.    Endocrine: Negative.  Negative for polydipsia.   Hematologic/Lymphatic: Positive for bleeding problem.   Skin: Negative for itching and rash.   Musculoskeletal: Positive for back pain and joint pain. Negative for muscle weakness.   Gastrointestinal: Negative for abdominal pain, constipation, diarrhea and heartburn.   Genitourinary: Negative for dysuria and flank pain.   Neurological: Negative for dizziness and headaches.   Psychiatric/Behavioral: Negative.    Allergic/Immunologic: Negative.    All other systems reviewed and are negative.     Objective   Vitals:    02/11/20 1441   BP: (!) 142/80   Pulse: 68   Resp: 16   Weight 227.7 pounds    Physical Exam   Constitutional: He is oriented to person, place, and time. He appears well-nourished. No distress.   Utilizes cane for stability of gait   HENT:   Head: Atraumatic.   Nose: Nose normal.   Eyes: Conjunctivae are normal. No scleral icterus.   Neck: No JVD present.   Cardiovascular: Normal rate.   Murmur (Soft ejection systolic murmur.  No diastolic murmur) heard.  Irregular rhythm   Pulmonary/Chest: Effort normal and breath sounds normal. He has no wheezes.   Abdominal: Soft. Bowel sounds are normal. There is no tenderness. There is no rebound.   Musculoskeletal: He exhibits edema. He exhibits no tenderness.   Neurological: He is  oriented to person, place, and time.   Skin: Skin is dry. No rash noted.   Psychiatric: He has a normal mood and affect. His behavior is normal. Judgment and thought content normal.   Nursing note and vitals reviewed.      Labs   Lab Results   Component Value Date    WBC 9.66 11/28/2019    HGB 12.6 (L) 11/28/2019    HCT 37.4 (L) 11/28/2019     11/28/2019    CHOL 183 12/20/2017    TRIG 248 (H) 12/20/2017    HDL 42 (L) 12/20/2017    ALT 24 11/13/2019    AST 20 11/13/2019     11/28/2019    K 4.4 11/28/2019     11/28/2019    CREATININE 1.2 11/28/2019    BUN 20 11/28/2019    CO2 22 11/28/2019    TSH 2.00 07/23/2016    INR 1.3 11/27/2019     Imaging:  Echocardiogram December 17, 2019/post TAVR    Interpretation Summary     S/p transfemoral TAVR (#29 evolut) on 11/27/2019 at [facility]. Patient is in afib and V paced.     1. Left ventricle: Mildy dilated left ventricle. LVEDD 5.9 cm. Mild concentric left ventricular hypertrophy. Mildy decreased systolic function with estimated ejection fraction 40-45%.  Mild global hypokinesis. Septal wall motion consistent with RV pacing. Impaired LV relaxation consistent with at least grade I diastolic dysfunction.     2. Aortic valve and root: s/p TAVR 29mm evolut with peak aortic velocity 2 m/s. Mean gradient 8 mmHg.(apical) . At least mild paravalvular aortic valve regurgitation.  No transvalvular regurgitation.   The aortic root size is normal. The proximal ascending aorta is not well visualized.     3. Mitral valve: Mitral valve is sclerotic.. Mild mitral regurgitation.     4. Atria: Mildy dilated left atrium. Mildy dilated right atrium     5. Right Ventricle: RV mildly dilated with mildly reduced RV systolic function..     6. Tricuspid and Pulmonic valve: Tricuspid valve is thin and opens well. Mild tricuspid regurgitation with estimated right ventricular systolic pressure of 35-40 mmHg. Pulmonary valve is not well visualized. No pulmonary regurgitation.     7.  IVC: IVC is normal in size with >50% collapse with respiration. There is hepatic systolic flow reversal.     8. Effusions:  No pericardial effusion.     9. Compared with 11/27/2019 echo, no major change.        ECG  Electronic ventricular pacemaker        Assessment plan:    1.  Critical calcific aortic stenosis status post transcatheter aortic valve replacement.  Doing very well status post intervention.  The echo from last month was reviewed in detail.  I encouraged him to continue to take advantage of his improved tolerance for activity and exercise is much as possible.    2.  Ischemic cardiomyopathy.  Status post percutaneous coronary intervention as well as coronary bypass graft surgery.  Ejection fraction remains stable.  No symptoms of congestive heart failure or angina.  I did talk with him in detail about salt limitation in his diet.  No changes in medications at this time.    3.  Sick sinus syndrome.  History of paroxysmal atrial fibrillation status post pacemaker therapy.  Remains anticoagulated.  By history he has a Biotronix pacemaking device in place.  We will coordinate with his electrophysiologist management of the pacemaker at the time of his valve implantation.    4.  Systemic hypertension.  Blood pressure adequately controlled at this time.    5.  History of colon resection for cecal cancer..  Additional history of colonic polyposis which is followed clinically.    6.  Diabetes mellitus.  Managed by his family physician.    7.  Peripheral vascular disease status post lower extremity digit amputation.    In summary he is doing very well in this early time after his transcatheter invention.  No limits placed on his activity.  He will follow-up in the surgical office with an echo in 4 months and a follow-up in this office will be in 6 months.  He is to call with any questions or problems.     Efe Mejia MD  2/11/2020

## 2020-02-12 RX ORDER — LISINOPRIL 10 MG/1
10 TABLET ORAL DAILY
Qty: 90 TABLET | Refills: 3 | Status: SHIPPED | OUTPATIENT
Start: 2020-02-12 | End: 2021-02-20 | Stop reason: SDUPTHER

## 2020-02-12 NOTE — TELEPHONE ENCOUNTER
Melania, pt of Dr. Mejia [facility] needs refill on Lisinopril. [facility] sent refill request fax.

## 2020-08-24 PROBLEM — E78.5 DYSLIPIDEMIA: Status: RESOLVED | Noted: 2018-03-20 | Resolved: 2020-08-24

## 2020-08-27 ENCOUNTER — OFFICE VISIT (OUTPATIENT)
Dept: CARDIOLOGY | Facility: CLINIC | Age: 85
End: 2020-08-27
Payer: MEDICARE

## 2020-08-27 VITALS
HEART RATE: 64 BPM | SYSTOLIC BLOOD PRESSURE: 140 MMHG | RESPIRATION RATE: 16 BRPM | WEIGHT: 220 LBS | DIASTOLIC BLOOD PRESSURE: 70 MMHG | HEIGHT: 74 IN | BODY MASS INDEX: 28.23 KG/M2

## 2020-08-27 DIAGNOSIS — Z95.1 S/P CABG (CORONARY ARTERY BYPASS GRAFT): Chronic | ICD-10-CM

## 2020-08-27 DIAGNOSIS — I48.91 ATRIAL FIBRILLATION, UNSPECIFIED TYPE (CMS/HCC): Chronic | ICD-10-CM

## 2020-08-27 DIAGNOSIS — E78.2 MIXED HYPERLIPIDEMIA: ICD-10-CM

## 2020-08-27 DIAGNOSIS — Z95.2 S/P TAVR (TRANSCATHETER AORTIC VALVE REPLACEMENT): Primary | Chronic | ICD-10-CM

## 2020-08-27 DIAGNOSIS — Z98.61 HISTORY OF PERCUTANEOUS CORONARY INTERVENTION: ICD-10-CM

## 2020-08-27 PROBLEM — I10 HYPERTENSION: Status: ACTIVE | Noted: 2020-08-27

## 2020-08-27 PROCEDURE — 99214 OFFICE O/P EST MOD 30 MIN: CPT | Performed by: INTERNAL MEDICINE

## 2020-08-27 PROCEDURE — 93000 ELECTROCARDIOGRAM COMPLETE: CPT | Performed by: INTERNAL MEDICINE

## 2020-08-27 ASSESSMENT — ENCOUNTER SYMPTOMS: WEIGHT LOSS: 1

## 2020-08-27 NOTE — PROGRESS NOTES
Cardiology Note       Reason for visit: Scheduled cardiology follow-up    Chief Complaint   Patient presents with   • Hypertension   Aortic valve stenosis status post transcatheter aortic valve replacement  Peripheral vascular disease     HPI   Cain Pruitt is a 88 y.o. male who presents for cardiac follow up.  He was last seen in the office in February.  2 weeks ago he underwent an amputation of his second toe on his left foot due to osteomyelitis by Dr. Saurabh Carlson in the [facility].  He does have a follow-up appointment within the next few days.  There will be discussion on whether or not the patient needs a PICC line placed with IV antibiotics at home due to the cultures That were sent after his amputation.  He is going to follow up with ID.    His son is with him for today's visit.  He is in a wheelchair for the exam due to his recent amputation.  He does have a soft boot on his left foot.  Prior to his surgery he was active and vigorous walking and playing golf.    He denies chest pain, shortness of breath, palpitations, lightheadedness or syncope.  He does have some slight lower extremity edema with venous varicosities.  He denies bleeding on Eliquis.    He has a good appetite and sleeps well.  He does not use a CPAP.  He has lost 7 pounds.  His bowels are functioning normally.      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    • Colon polyps    • Coronary artery disease 1991    s/p cabg    • Decreased cardiac ejection fraction 1/24/2019   • Edema    • Erectile dysfunction    • GI (gastrointestinal bleed)    • GI (gastrointestinal bleed) 06/2016   • Heart block 1/24/2019   • History of percutaneous coronary intervention 8/12/2019     "BMS to SVG to OM 2007   • History of transfusion 2011    during colon resection had \"nicked artery\"- required multiple unitos pf blood    • Hyperlipidemia    • Hypertension    • Hypotension    • SIENA (obstructive sleep apnea) 8/12/2019   • Osteomyelitis of toe of right foot (CMS/[facility]) 2016    s/p 2nd and 3rd toe osteomylitis    • Pacemaker 1/24/2019   • PAD (peripheral artery disease) (CMS/[facility]) 8/12/2019   • Peripheral neuropathy     tips of fingers  and feet    • S/P CABG (coronary artery bypass graft) 1991 1991 LIMA to LAD, ARTURO to RDA, SVG to OM   • 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    Transfemoral TAVR #29 Evolut Pro on 11-   • Sleep apnea    • Syncope     2008,2011   • Type 2 diabetes mellitus (CMS/HCC)     last HgBA1C was 7.2 in 7/2019 managed by PCP Dr Aguilar Phan   • Urinary retention     self cath's 3 times per day,    • Vitamin D deficiency      Past Surgical History:   Procedure Laterality Date   • AMPUTATION FOOT / TOE Right 2016    2nd and 3rd right foot from osteomylitis    • BOWEL RESECTION  2011   • CARDIAC PACEMAKER PLACEMENT  2013   • CARDIAC SURGERY     • CARDIOVERSION  2008   • CORONARY ARTERY BYPASS GRAFT  1991    x 4 bypasses    • CORONARY STENT PLACEMENT      3 stents   • KNEE SURGERY Right 1951     Social History     Socioeconomic History   • Marital status:      Spouse name: None   • Number of children: None   • Years of education: None   • Highest education level: None   Occupational History   • None   Social Needs   • Financial resource strain: None   • Food insecurity:     Worry: None     Inability: None   • Transportation needs:     Medical: None     Non-medical: None   Tobacco Use   • Smoking status: Never Smoker   • Smokeless tobacco: Never Used   Substance and Sexual Activity   • Alcohol use: Yes     Frequency: 2-4 times a month     Comment: occassionally/social   • Drug use: Defer   • Sexual activity: Defer   Lifestyle   • Physical activity: "     Days per week: None     Minutes per session: None   • Stress: None   Relationships   • Social connections:     Talks on phone: None     Gets together: None     Attends Rastafarian service: None     Active member of club or organization: None     Attends meetings of clubs or organizations: None     Relationship status: None   • Intimate partner violence:     Fear of current or ex partner: None     Emotionally abused: None     Physically abused: None     Forced sexual activity: None   Other Topics Concern   • None   Social History Narrative   • None     Family History   Problem Relation Age of Onset   • Clotting disorder Biological Mother    • Leukemia Biological Father      Patient has no known allergies.  Current Outpatient Medications   Medication Sig Dispense Refill   • apixaban (ELIQUIS) 5 mg tablet Take 5 mg by mouth 2 (two) times a day. To stop 2 days pre-op      • aspirin 81 mg enteric coated tablet Take 1 tablet (81 mg total) by mouth daily. (Patient taking differently: Take 81 mg by mouth every other day.  ) 30 tablet 0   • bumetanide (BUMEX) 0.5 mg tablet Take 0.5 mg by mouth every morning.       • cholecalciferol, vitamin D3, (cholecalciferol) 1,000 unit tablet Take 1,000 Units by mouth daily.       • cyanocobalamin (vitamin B-12) 1,000 mcg tablet Take 1,000 mcg by mouth daily.     • dutasteride (AVODART) 0.5 mg capsule Take 0.5 mg by mouth nightly.    0   • ezetimibe (ZETIA) 10 mg tablet Take 1 tablet (10 mg total) by mouth nightly. 90 tablet 3   • ferrous sulfate 325 mg (65 mg iron) tablet Take 65 mg by mouth every other day.       • ipratropium (ATROVENT) 0.03 % nasal spray Administer 0.03 % into affected nostril(s) as needed. 2 sprays by intranasal route 2-3 times every day in each nostril      • LACTOBACILLUS ACIDOPHILUS (PROBIOTIC ORAL) Take by mouth daily.     • lisinopril (PRINIVIL) 10 mg tablet Take 1 tablet (10 mg total) by mouth daily. 90 tablet 3   • metFORMIN (GLUCOPHAGE) 500 mg tablet Take  500 mg by mouth daily with breakfast.       • metoprolol succinate XL (TOPROL-XL) 50 mg 24 hr tablet Take 50 mg by mouth nightly.       • potassium chloride (MICRO-K) 10 mEq CR capsule Take 10 mEq by mouth every other day.       • silodosin (RAPAFLO) 8 mg capsule Take 8 mg by mouth nightly.         No current facility-administered medications for this visit.        Review of Systems   Constitution: Positive for weight loss.        7 lbs   Cardiovascular: Positive for leg swelling.   Skin:        Boot on the left foot with an Ace wrapping secondary to recent amputation of the second toe.   All other systems reviewed and are negative.    Objective   Vitals:    08/27/20 1046   BP: (!) 180/70   Pulse:    Resp:        Physical Exam:    General Appearance:  Alert, no distress   Head:  Normocephalic, without obvious abnormality, atraumatic   Eyes:  Conjunctiva/corneas clear, EOM's intact   Neck: No thyroid enlargement. No JVD. No bruits    Lungs:   Clear to auscultation bilaterally, respirations unlabored, no rales, no wheezing   Heart:  Regular rhythm, S1 and S2 normal, no murmur, rub or gallop   Abdomen:   Soft, non-tender, no masses, no organomegaly   Vascular: Pulses 2+ and symmetric all extremities, no carotid bruit or jugular vein distention   Musculoskeletal:  Skin: No injury or deformity  Skin color, texture, turgor normal, no rashes or lesions, no cyanosis or edema.  Soft boot on left foot with ACE wrap.   Extremities: Extremities normal, atraumatic, pulses normal   Behavior/Emotional:  Neurologic: Appropriate, cooperative  No focal neurologic findings       Lab Results   Component Value Date    WBC 9.66 11/28/2019    HGB 12.6 (L) 11/28/2019     11/28/2019    CHOL 183 12/20/2017    TRIG 248 (H) 12/20/2017    HDL 42 (L) 12/20/2017    ALT 24 11/13/2019    AST 20 11/13/2019     11/28/2019    K 4.4 11/28/2019    CREATININE 1.2 11/28/2019    TSH 2.00 07/23/2016    INR 1.3 11/27/2019          ECG       ASSESSMENT/PLAN    SIENA (obstructive sleep apnea)  He does not use CPAP.    Atrial fibrillation (CMS/HCC)  He remains on Eliquis and follows up with Dr. Meyer.  He has no bleeding and has not fallen.    S/P TAVR (transcatheter aortic valve replacement)  Transfemoral TAVR #29 Evolut Pro on 11- 12/2019 echo:  Aortic valve and root: s/p TAVR 29mm evolut with peak aortic velocity 2 m/s. Mean gradient 8 mmHg.(apical) . At least mild paravalvular aortic valve regurgitation.  No transvalvular regurgitation.   The aortic root size is normal. The proximal ascending aorta is not well visualized.    Patient does have to meet with infectious disease due to the fact that his blood cultures, post amputation were positive for a few different organisms.  We do recommend that he proceed with PICC line placement and IV antibiotics.  He and his son verbalized understanding.       Heart block  S/p pacer and follows up with Dr. Meyer.    Coronary artery disease involving native coronary artery without angina pectoris  He has no obvious angina or heart failure.  He will continue on his current medications.  He will remain as active as possible.    Hypertension  His blood pressure was elevated  At the beginning of the exam.  By the end of the exam it was 140/70.  He did take his pills right before he left the house to come to this appointment.  Therefore we will not adjust his medications at this time.        ROSALIO Cuadra  8/27/2020     The patient was seen and examined.  An independent interval history was obtained and physical exam performed.  I agree with the information and findings as documented above.    Both patient and family report an excellent response to transcatheter therapy for symptomatic aortic stenosis.  In general he continues to do well overall from a cardiovascular perspective.    I indicated to him and family that if there is any concern about the possibility of infection and bacteremia, then  I think it would be prudent to pursue placement of a temporary intravenous line for the purposes of antibiotics.  Every effort to protect his valve from endovascular infection should be pursued and I think the anticipation of chronic antibiotic therapy has low risk of potentially significant benefit.    No other changes made medications and no limits placed on his activity.  Plan follow-up here will be 6 months.    Efe Mejia MD

## 2020-08-27 NOTE — ASSESSMENT & PLAN NOTE
Transfemoral TAVR #29 Evolut Pro on 11- 12/2019 echo:  Aortic valve and root: s/p TAVR 29mm evolut with peak aortic velocity 2 m/s. Mean gradient 8 mmHg.(apical) . At least mild paravalvular aortic valve regurgitation.  No transvalvular regurgitation.   The aortic root size is normal. The proximal ascending aorta is not well visualized.    Patient does have to meet with infectious disease due to the fact that his blood cultures, post amputation were positive for a few different organisms.  We do recommend that he proceed with PICC line placement and IV antibiotics.  He and his son verbalized understanding.

## 2020-08-27 NOTE — LETTER
August 27, 2020     Aguilar Phan MD  [address] [facility] [id number]    Patient: Cain Pruitt  YOB: 1932  Date of Visit: 8/27/2020      Dear Dr. Phan:    Thank you for referring Cain Pruitt to me for evaluation. Below are my notes for this consultation.    If you have questions, please do not hesitate to call me. I look forward to following your patient along with you.         Sincerely,        Efe Mejia MD        CC: MD Bryant Andre MD Pavone, Theresa V, CRNP  8/27/2020 11:13 AM  Signed     Cardiology Note       Reason for visit: Scheduled cardiology follow-up    Chief Complaint   Patient presents with   • Hypertension   Aortic valve stenosis status post transcatheter aortic valve replacement  Peripheral vascular disease     HPI   Cain Pruitt is a 88 y.o. male who presents for cardiac follow up.  He was last seen in the office in February.  2 weeks ago he underwent an amputation of his second toe on his left foot due to osteomyelitis by Dr. Saurabh Carlson in the [facility].  He does have a follow-up appointment within the next few days.  There will be discussion on whether or not the patient needs a PICC line placed with IV antibiotics at home due to the cultures That were sent after his amputation.  He is going to follow up with ID.    His son is with him for today's visit.  He is in a wheelchair for the exam due to his recent amputation.  He does have a soft boot on his left foot.  Prior to his surgery he was active and vigorous walking and playing golf.    He denies chest pain, shortness of breath, palpitations, lightheadedness or syncope.  He does have some slight lower extremity edema with venous varicosities.  He denies bleeding on Eliquis.    He has a good appetite and sleeps well.  He does not use a CPAP.  He has lost 7 pounds.  His bowels are functioning normally.      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    • Colon polyps    • Coronary artery disease 1991    s/p cabg    • Decreased cardiac ejection fraction 1/24/2019   • Edema    • Erectile dysfunction    • GI (gastrointestinal bleed)    • GI (gastrointestinal bleed) 06/2016   • Heart block 1/24/2019   • History of percutaneous coronary intervention 8/12/2019    BMS to SVG to OM 2007   • History of transfusion 2011    during colon resection had \"nicked artery\"- required multiple unitos pf blood    • Hyperlipidemia    • Hypertension    • Hypotension    • SIENA (obstructive sleep apnea) 8/12/2019   • Osteomyelitis of toe of right foot (CMS/[facility]) 2016    s/p 2nd and 3rd toe osteomylitis    • Pacemaker 1/24/2019   • PAD (peripheral artery disease) (CMS/HCC) 8/12/2019   • Peripheral neuropathy     tips of fingers  and feet    • S/P CABG (coronary artery bypass graft) 1991 1991 LIMA to LAD, ARTURO to RDA, SVG to OM   • 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    Transfemoral TAVR #29 Evolut Pro on 11-   • Sleep apnea    • Syncope     2008,2011   • Type 2 diabetes mellitus (CMS/[facility])     last HgBA1C was 7.2 in 7/2019 managed by PCP Dr Aguilar Phan   • Urinary retention     self cath's 3 times per day,    • Vitamin D deficiency      Past Surgical History:   Procedure Laterality Date   • AMPUTATION FOOT / TOE Right 2016    2nd and 3rd right foot from osteomylitis    • BOWEL RESECTION  2011   • CARDIAC PACEMAKER PLACEMENT  2013   • CARDIAC SURGERY     • CARDIOVERSION  2008   • CORONARY ARTERY BYPASS GRAFT  1991    x 4 bypasses    • CORONARY STENT PLACEMENT      3 stents   • KNEE SURGERY Right 1951     Social History     Socioeconomic History   • Marital status:      Spouse name: None   • Number of " children: None   • Years of education: None   • Highest education level: None   Occupational History   • None   Social Needs   • Financial resource strain: None   • Food insecurity:     Worry: None     Inability: None   • Transportation needs:     Medical: None     Non-medical: None   Tobacco Use   • Smoking status: Never Smoker   • Smokeless tobacco: Never Used   Substance and Sexual Activity   • Alcohol use: Yes     Frequency: 2-4 times a month     Comment: occassionally/social   • Drug use: Defer   • Sexual activity: Defer   Lifestyle   • Physical activity:     Days per week: None     Minutes per session: None   • Stress: None   Relationships   • Social connections:     Talks on phone: None     Gets together: None     Attends Anabaptist service: None     Active member of club or organization: None     Attends meetings of clubs or organizations: None     Relationship status: None   • Intimate partner violence:     Fear of current or ex partner: None     Emotionally abused: None     Physically abused: None     Forced sexual activity: None   Other Topics Concern   • None   Social History Narrative   • None     Family History   Problem Relation Age of Onset   • Clotting disorder Biological Mother    • Leukemia Biological Father      Patient has no known allergies.  Current Outpatient Medications   Medication Sig Dispense Refill   • apixaban (ELIQUIS) 5 mg tablet Take 5 mg by mouth 2 (two) times a day. To stop 2 days pre-op      • aspirin 81 mg enteric coated tablet Take 1 tablet (81 mg total) by mouth daily. (Patient taking differently: Take 81 mg by mouth every other day.  ) 30 tablet 0   • bumetanide (BUMEX) 0.5 mg tablet Take 0.5 mg by mouth every morning.       • cholecalciferol, vitamin D3, (cholecalciferol) 1,000 unit tablet Take 1,000 Units by mouth daily.       • cyanocobalamin (vitamin B-12) 1,000 mcg tablet Take 1,000 mcg by mouth daily.     • dutasteride (AVODART) 0.5 mg capsule Take 0.5 mg by mouth  nightly.    0   • ezetimibe (ZETIA) 10 mg tablet Take 1 tablet (10 mg total) by mouth nightly. 90 tablet 3   • ferrous sulfate 325 mg (65 mg iron) tablet Take 65 mg by mouth every other day.       • ipratropium (ATROVENT) 0.03 % nasal spray Administer 0.03 % into affected nostril(s) as needed. 2 sprays by intranasal route 2-3 times every day in each nostril      • LACTOBACILLUS ACIDOPHILUS (PROBIOTIC ORAL) Take by mouth daily.     • lisinopril (PRINIVIL) 10 mg tablet Take 1 tablet (10 mg total) by mouth daily. 90 tablet 3   • metFORMIN (GLUCOPHAGE) 500 mg tablet Take 500 mg by mouth daily with breakfast.       • metoprolol succinate XL (TOPROL-XL) 50 mg 24 hr tablet Take 50 mg by mouth nightly.       • potassium chloride (MICRO-K) 10 mEq CR capsule Take 10 mEq by mouth every other day.       • silodosin (RAPAFLO) 8 mg capsule Take 8 mg by mouth nightly.         No current facility-administered medications for this visit.        Review of Systems   Constitution: Positive for weight loss.        7 lbs   Cardiovascular: Positive for leg swelling.   Skin:        Boot on the left foot with an Ace wrapping secondary to recent amputation of the second toe.   All other systems reviewed and are negative.    Objective   Vitals:    08/27/20 1046   BP: (!) 180/70   Pulse:    Resp:        Physical Exam:    General Appearance:  Alert, no distress   Head:  Normocephalic, without obvious abnormality, atraumatic   Eyes:  Conjunctiva/corneas clear, EOM's intact   Neck: No thyroid enlargement. No JVD. No bruits    Lungs:   Clear to auscultation bilaterally, respirations unlabored, no rales, no wheezing   Heart:  Regular rhythm, S1 and S2 normal, no murmur, rub or gallop   Abdomen:   Soft, non-tender, no masses, no organomegaly   Vascular: Pulses 2+ and symmetric all extremities, no carotid bruit or jugular vein distention   Musculoskeletal:  Skin: No injury or deformity  Skin color, texture, turgor normal, no rashes or lesions, no  cyanosis or edema.  Soft boot on left foot with ACE wrap.   Extremities: Extremities normal, atraumatic, pulses normal   Behavior/Emotional:  Neurologic: Appropriate, cooperative  No focal neurologic findings       Lab Results   Component Value Date    WBC 9.66 11/28/2019    HGB 12.6 (L) 11/28/2019     11/28/2019    CHOL 183 12/20/2017    TRIG 248 (H) 12/20/2017    HDL 42 (L) 12/20/2017    ALT 24 11/13/2019    AST 20 11/13/2019     11/28/2019    K 4.4 11/28/2019    CREATININE 1.2 11/28/2019    TSH 2.00 07/23/2016    INR 1.3 11/27/2019          ECG      ASSESSMENT/PLAN    SIENA (obstructive sleep apnea)  He does not use CPAP.    Atrial fibrillation (CMS/HCC)  He remains on Eliquis and follows up with Dr. Meyer.  He has no bleeding and has not fallen.    S/P TAVR (transcatheter aortic valve replacement)  Transfemoral TAVR #29 Evolut Pro on 11- 12/2019 echo:  Aortic valve and root: s/p TAVR 29mm evolut with peak aortic velocity 2 m/s. Mean gradient 8 mmHg.(apical) . At least mild paravalvular aortic valve regurgitation.  No transvalvular regurgitation.   The aortic root size is normal. The proximal ascending aorta is not well visualized.    Patient does have to meet with infectious disease due to the fact that his blood cultures, post amputation were positive for a few different organisms.  We do recommend that he proceed with PICC line placement and IV antibiotics.  He and his son verbalized understanding.       Heart block  S/p pacer and follows up with Dr. Meyer.    Coronary artery disease involving native coronary artery without angina pectoris  He has no obvious angina or heart failure.  He will continue on his current medications.  He will remain as active as possible.    Hypertension  His blood pressure was elevated  At the beginning of the exam.  By the end of the exam it was 140/70.  He did take his pills right before he left the house to come to this appointment.  Therefore we will  not adjust his medications at this time.        ROSALIO Cuadra  8/27/2020     The patient was seen and examined.  An independent interval history was obtained and physical exam performed.  I agree with the information and findings as documented above.    Both patient and family report an excellent response to transcatheter therapy for symptomatic aortic stenosis.  In general he continues to do well overall from a cardiovascular perspective.    I indicated to him and family that if there is any concern about the possibility of infection and bacteremia, then I think it would be prudent to pursue placement of a temporary intravenous line for the purposes of antibiotics.  Every effort to protect his valve from endovascular infection should be pursued and I think the anticipation of chronic antibiotic therapy has low risk of potentially significant benefit.    No other changes made medications and no limits placed on his activity.  Plan follow-up here will be 6 months.    Efe Mejia MD

## 2020-08-27 NOTE — ASSESSMENT & PLAN NOTE
He has no obvious angina or heart failure.  He will continue on his current medications.  He will remain as active as possible.

## 2020-08-27 NOTE — ASSESSMENT & PLAN NOTE
His blood pressure was elevated  At the beginning of the exam.  By the end of the exam it was 140/70.  He did take his pills right before he left the house to come to this appointment.  Therefore we will not adjust his medications at this time.

## 2021-01-11 RX ORDER — EZETIMIBE 10 MG/1
10 TABLET ORAL NIGHTLY
Qty: 90 TABLET | Refills: 3 | Status: SHIPPED | OUTPATIENT
Start: 2021-01-11 | End: 2022-01-11

## 2021-01-11 NOTE — TELEPHONE ENCOUNTER
Rossana----Bridget Caceres just refilled this medication.  I do not want to refuse it because then the patient is going to think that I refused it in their chart.  She did send it.  Is there a way for you to remove this request?

## 2021-01-12 RX ORDER — EZETIMIBE 10 MG/1
10 TABLET ORAL NIGHTLY
Qty: 90 TABLET | Refills: 3 | OUTPATIENT
Start: 2021-01-12

## 2021-02-22 RX ORDER — LISINOPRIL 10 MG/1
10 TABLET ORAL DAILY
Qty: 90 TABLET | Refills: 3 | Status: SHIPPED | OUTPATIENT
Start: 2021-02-22

## 2021-03-04 ENCOUNTER — TELEPHONE (OUTPATIENT)
Dept: CARDIOLOGY | Facility: CLINIC | Age: 86
End: 2021-03-04

## 2021-03-04 ENCOUNTER — OFFICE VISIT (OUTPATIENT)
Dept: CARDIOLOGY | Facility: CLINIC | Age: 86
End: 2021-03-04
Payer: MEDICARE

## 2021-03-04 VITALS
OXYGEN SATURATION: 99 % | BODY MASS INDEX: 27.34 KG/M2 | RESPIRATION RATE: 16 BRPM | HEART RATE: 70 BPM | HEIGHT: 74 IN | WEIGHT: 213 LBS | DIASTOLIC BLOOD PRESSURE: 60 MMHG | SYSTOLIC BLOOD PRESSURE: 140 MMHG

## 2021-03-04 DIAGNOSIS — Z95.2 S/P TAVR (TRANSCATHETER AORTIC VALVE REPLACEMENT): Primary | Chronic | ICD-10-CM

## 2021-03-04 DIAGNOSIS — G47.33 OSA (OBSTRUCTIVE SLEEP APNEA): ICD-10-CM

## 2021-03-04 DIAGNOSIS — I10 ESSENTIAL HYPERTENSION: ICD-10-CM

## 2021-03-04 DIAGNOSIS — I48.91 ATRIAL FIBRILLATION, UNSPECIFIED TYPE (CMS/HCC): Chronic | ICD-10-CM

## 2021-03-04 PROBLEM — I45.9 HEART BLOCK: Chronic | Status: ACTIVE | Noted: 2019-01-24

## 2021-03-04 PROCEDURE — 99214 OFFICE O/P EST MOD 30 MIN: CPT | Performed by: INTERNAL MEDICINE

## 2021-03-04 PROCEDURE — 93000 ELECTROCARDIOGRAM COMPLETE: CPT | Performed by: INTERNAL MEDICINE

## 2021-03-04 RX ORDER — TRAMADOL HYDROCHLORIDE 50 MG/1
50 TABLET ORAL DAILY
COMMUNITY
Start: 2021-02-08 | End: 2022-03-08

## 2021-03-04 ASSESSMENT — ENCOUNTER SYMPTOMS: WEIGHT LOSS: 1

## 2021-03-04 NOTE — ASSESSMENT & PLAN NOTE
He has no obvious angina or heart failure.  He will remain on his current medications.  He will remain as active as possible. He will follow-up in the office in 6 months and have a resting echocardiogram done at that time.

## 2021-03-04 NOTE — LETTER
March 4, 2021     Aguilar Phan MD  [address] [facility] [id number]    Patient: Cain Pruitt  YOB: 1932  Date of Visit: 3/4/2021      Dear Dr. Phan:    Thank you for referring Cain Pruitt to me for evaluation. Below are my notes for this consultation.    If you have questions, please do not hesitate to call me. I look forward to following your patient along with you.         Sincerely,        Efe Mejia MD        CC: MD Bryant Andre MD Pavone, Theresa V, CRNP  3/4/2021 11:02 AM  Signed     Cardiology Note       Reason for visit: Scheduled cardiology follow-up    Chief Complaint   Patient presents with   • S/P TAVR (transcatheter aortic valve replacement)   Permanent pacemaker  Obstructive sleep apnea  Systemic hypertension     HPI   Cain Pruitt is a 88 y.o. male who presents for Cardiac follow-up.  He was last seen in the office in August.  His son is with him today.    When we last saw the patient he had recently undergone an amputation of the second toe on his left foot due to osteomyelitis.  He did visit with infectious disease shortly after our visit and did have a PICC line placed and received 6 weeks of IV antibiotics.  He required no further treatment.  Unfortunately he was Covid positive in December.  Thankfully, he did not have to be hospitalized.  He has recovered.    He recently herniated L4 and L5 after lifting up something heavy.  Physical therapy is coming into the home a few days a week.  Therapy sessions are helping but he is not fully ambulatory yet.    He denies chest pain, shortness of breath, palpitations, lightheadedness or syncope.  He does have some slight lower extremity edema.  He spends a large portion of the day seated.  He denies bleeding on Eliquis.  He has a good appetite and sleeps well.  He does not use a CPAP.  His weight is down 7 pounds but he tells me that he gained some of his weight back.  He did lose more  "weight when he was sick after beatriz the Covid virus.  His bowels are functioning normally.      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    • Colon polyps    • Coronary artery disease 1991    s/p cabg    • Decreased cardiac ejection fraction 1/24/2019   • Edema    • Erectile dysfunction    • GI (gastrointestinal bleed)    • GI (gastrointestinal bleed) 06/2016   • Heart block 1/24/2019   • History of percutaneous coronary intervention 8/12/2019    BMS to SVG to OM 2007   • History of transfusion 2011    during colon resection had \"nicked artery\"- required multiple unitos pf blood    • Hyperlipidemia    • Hypertension    • Hypotension    • Lumbar herniated disc    • SIENA (obstructive sleep apnea) 8/12/2019   • Osteomyelitis of toe of right foot (CMS/[facility]) 2016    s/p 2nd and 3rd toe osteomylitis    • Pacemaker 1/24/2019   • PAD (peripheral artery disease) (CMS/[facility]) 8/12/2019   • Peripheral neuropathy     tips of fingers  and feet    • S/P CABG (coronary artery bypass graft) 1991 1991 LIMA to LAD, ARTURO to RDA, SVG to OM   • 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    Transfemoral TAVR #29 Evolut Pro on 11-   • Sleep apnea    • Syncope     2008,2011   • Type 2 diabetes mellitus (CMS/[facility])     last HgBA1C was 7.2 in 7/2019 managed by PCP Dr Aguilar Phan   • Urinary retention     self cath's 3 times per day,    • Vitamin D deficiency      Past Surgical History:   Procedure Laterality Date   • AMPUTATION FOOT / TOE Right 2016    2nd and 3rd right foot from osteomylitis    • BOWEL RESECTION  2011   • CARDIAC PACEMAKER PLACEMENT  2013   • CARDIAC SURGERY     • CARDIOVERSION  2008   • CORONARY ARTERY BYPASS GRAFT  1991    x 4 " bypasses    • CORONARY STENT PLACEMENT      3 stents   • KNEE SURGERY Right 1951   • MOHS SURGERY     • SKIN BIOPSY       Social History     Socioeconomic History   • Marital status:      Spouse name: None   • Number of children: None   • Years of education: None   • Highest education level: None   Occupational History   • None   Social Needs   • Financial resource strain: None   • Food insecurity     Worry: None     Inability: None   • Transportation needs     Medical: None     Non-medical: None   Tobacco Use   • Smoking status: Never Smoker   • Smokeless tobacco: Never Used   Substance and Sexual Activity   • Alcohol use: Yes     Frequency: 2-4 times a month     Comment: occassionally/social   • Drug use: Defer   • Sexual activity: Defer   Lifestyle   • Physical activity     Days per week: None     Minutes per session: None   • Stress: None   Relationships   • Social connections     Talks on phone: None     Gets together: None     Attends [employer] service: None     Active member of club or organization: None     Attends meetings of clubs or organizations: None     Relationship status: None   • Intimate partner violence     Fear of current or ex partner: None     Emotionally abused: None     Physically abused: None     Forced sexual activity: None   Other Topics Concern   • None   Social History Narrative   • None     Family History   Problem Relation Age of Onset   • Clotting disorder Biological Mother    • Leukemia Biological Father      Patient has no known allergies.  Current Outpatient Medications   Medication Sig Dispense Refill   • apixaban (ELIQUIS) 5 mg tablet Take 5 mg by mouth 2 (two) times a day. To stop 2 days pre-op      • aspirin 81 mg enteric coated tablet Take 1 tablet (81 mg total) by mouth daily. 30 tablet 0   • bumetanide (BUMEX) 0.5 mg tablet Take 0.5 mg by mouth every morning.       • cholecalciferol, vitamin D3, (cholecalciferol) 1,000 unit tablet Take 1,000 Units by mouth daily.        • cyanocobalamin (vitamin B-12) 1,000 mcg tablet Take 1,000 mcg by mouth daily.     • dutasteride (AVODART) 0.5 mg capsule Take 0.5 mg by mouth nightly.    0   • ezetimibe (ZETIA) 10 mg tablet Take 1 tablet (10 mg total) by mouth nightly. 90 tablet 3   • ferrous sulfate 325 mg (65 mg iron) tablet Take 65 mg by mouth every other day.       • ipratropium (ATROVENT) 0.03 % nasal spray Administer 0.03 % into affected nostril(s) as needed. 2 sprays by intranasal route 2-3 times every day in each nostril      • LACTOBACILLUS ACIDOPHILUS (PROBIOTIC ORAL) Take by mouth daily.     • lisinopriL (PRINIVIL) 10 mg tablet Take 1 tablet (10 mg total) by mouth daily. 90 tablet 3   • metFORMIN (GLUCOPHAGE) 500 mg tablet Take 500 mg by mouth daily with breakfast.       • metoprolol succinate XL (TOPROL-XL) 50 mg 24 hr tablet Take 50 mg by mouth nightly.       • potassium chloride (MICRO-K) 10 mEq CR capsule Take 10 mEq by mouth every other day.       • silodosin (RAPAFLO) 8 mg capsule Take 8 mg by mouth nightly.       • traMADoL (ULTRAM) 50 mg tablet Take 50 mg by mouth daily.       No current facility-administered medications for this visit.        Review of Systems   Constitution: Positive for weight loss.        7 lbs   Cardiovascular: Positive for leg swelling.   Musculoskeletal:        Herniated disc at L4 and L5   All other systems reviewed and are negative.    Objective   Vitals:    03/04/21 1009   BP: 140/60   Pulse: 70   Resp: 16   SpO2: 99%       Physical Exam:    General Appearance:  Alert, no distress   Head:  Normocephalic, without obvious abnormality, atraumatic   Eyes:  Conjunctiva/corneas clear, EOM's intact   Neck: No thyroid enlargement. No JVD. No bruits    Lungs:   Clear to auscultation bilaterally, respirations unlabored, no rales, no wheezing   Heart:  Regular rhythm, S1 and S2 normal,  Systolic ejection murmur, rub or gallop   Abdomen:   Soft, non-tender, no masses, no organomegaly   Vascular: Pulses 2+ and  symmetric all extremities, no carotid bruit or jugular vein distention   Musculoskeletal:  Skin: No injury or deformity  Skin color, texture, turgor normal, no rashes or lesions, no cyanosis or edema   Extremities: Extremities normal, atraumatic, pulses normal   Behavior/Emotional:  Neurologic: Appropriate, cooperative  No gross neurologic abnormalities.       Lab Results   Component Value Date    WBC 9.66 11/28/2019    HGB 12.6 (L) 11/28/2019     11/28/2019    CHOL 183 12/20/2017    TRIG 248 (H) 12/20/2017    HDL 42 (L) 12/20/2017    ALT 24 11/13/2019    AST 20 11/13/2019     11/28/2019    K 4.4 11/28/2019    CREATININE 1.2 11/28/2019    TSH 2.00 07/23/2016    INR 1.3 11/27/2019          ECG        ASSESSMENT/PLAN    SIENA (obstructive sleep apnea)  He does not use CPAP.    Atrial fibrillation (CMS/[facility])  He remains on Eliquis and follows up with Dr. Meyer.  He has no bleeding and has not fallen.    S/P TAVR (transcatheter aortic valve replacement)  Transfemoral TAVR #29 Evolut Pro on 11- 12/2019 echo:Aortic valve and root: s/p TAVR 29mm evolut with peak aortic velocity 2 m/s. Mean gradient 8 mmHg.(apical) . At least mild paravalvular aortic valve regurgitation.  No transvalvular regurgitation.   The aortic root size is normal. The proximal ascending aorta is not well visualized.    He feels well.  He will follow-up in the office in 6 months and have a resting echocardiogram performed at that time.    Coronary artery disease involving native coronary artery without angina pectoris  He has no obvious angina or heart failure.  He will remain on his current medications.  He will remain as active as possible. He will follow-up in the office in 6 months and have a resting echocardiogram done at that time.    Heart block  Status post pacer and he follows up with Dr. Meyer.    CHF (congestive heart failure), NYHA class III, acute on chronic, combined (CMS/[facility])  12/2019 echo:S/p transfemoral TAVR  (#29 evolut) on 11/27/2019 at [facility]. Patient is in afib and V paced.     1. Left ventricle: Mildy dilated left ventricle. LVEDD 5.9 cm. Mild concentric left ventricular hypertrophy. Mildy decreased systolic function with estimated ejection fraction 40-45%.  Mild global hypokinesis. Septal wall motion consistent with RV pacing. Impaired LV relaxation consistent with at least grade I diastolic dysfunction.     2. Aortic valve and root: s/p TAVR 29mm evolut with peak aortic velocity 2 m/s. Mean gradient 8 mmHg.(apical) . At least mild paravalvular aortic valve regurgitation.  No transvalvular regurgitation.   The aortic root size is normal. The proximal ascending aorta is not well visualized.     3. Mitral valve: Mitral valve is sclerotic.. Mild mitral regurgitation.     4. Atria: Mildy dilated left atrium. Mildy dilated right atrium     5. Right Ventricle: RV mildly dilated with mildly reduced RV systolic function..     6. Tricuspid and Pulmonic valve: Tricuspid valve is thin and opens well. Mild tricuspid regurgitation with estimated right ventricular systolic pressure of 35-40 mmHg. Pulmonary valve is not well visualized. No pulmonary regurgitation.     7. IVC: IVC is normal in size with >50% collapse with respiration. There is hepatic systolic flow reversal.     8. Effusions:  No pericardial effusion.    He is well compensated at present.  He will remain on his current dose of diuretics.  He had a creatinine of 1.3 with a potassium of 4.5 on February 25, 2021.    Hypertension  Under fair control today on his current medications.  They will remain unchanged.    Type 2 diabetes mellitus (CMS/[facility])  He is on oral therapy and follows up with his primary care physician regarding his diabetes.    Hyperlipidemia  He is on lipid-lowering therapy which he tolerates.  Laboratory studies were drawn on February 25, 2021.  Total cholesterol 170, triglycerides 196, HDL 40 with an LDL of 91.    S/P CABG (coronary artery bypass  graft)  1991 LIMA to LAD, ARTURO to RDA, SVG to OM    We will follow up with him in the office in 6 months.  He will have a resting echocardiogram at that time.  He knows to contact us prior to then with any signs or symptoms of concern.      By signing my name below, I, Melaniasa BOBO Padgett, attest that this documentation has been prepared under the direction and in the presence of Efe Mejia MD.     ROSALIO Cuadra  3/4/2021     The patient was seen and examined.  An independent interval history was obtained and physical exam performed.  I agree with the information and findings as documented above.    Doing very well overall status post transcatheter therapy a little over a year ago.  No symptoms of heart failure, angina or arrhythmia.  From a cardiac perspective, no limitations were placed on his activity.  No medication changes advised.  I have asked him to return in 6 months at which time a repeat echocardiogram will be obtained in follow-up to his valvular intervention.    Efe Mejia MD

## 2021-03-04 NOTE — TELEPHONE ENCOUNTER
TIGRE Velázquez. Patient has remote monitoring and his february download showed normal function. I have asked our PSRs to schedule an appt in September to see Dr Meyer- hopefully same day a he is sched to see you all.

## 2021-03-04 NOTE — ASSESSMENT & PLAN NOTE
He is on lipid-lowering therapy which he tolerates.  Laboratory studies were drawn on February 25, 2021.  Total cholesterol 170, triglycerides 196, HDL 40 with an LDL of 91.

## 2021-03-04 NOTE — ASSESSMENT & PLAN NOTE
Transfemoral TAVR #29 Evolut Pro on 11- 12/2019 echo:Aortic valve and root: s/p TAVR 29mm evolut with peak aortic velocity 2 m/s. Mean gradient 8 mmHg.(apical) . At least mild paravalvular aortic valve regurgitation.  No transvalvular regurgitation.   The aortic root size is normal. The proximal ascending aorta is not well visualized.    He feels well.  He will follow-up in the office in 6 months and have a resting echocardiogram performed at that time.

## 2021-03-04 NOTE — TELEPHONE ENCOUNTER
Grisel please have pt schedule an appt with Dr. Meyer. He is seeing Dr Mejia in September 2021. The patient can wait until then because we have frequent remote checks. Maybe we can schedule the same day if possible.

## 2021-03-04 NOTE — ASSESSMENT & PLAN NOTE
12/2019 echo:S/p transfemoral TAVR (#29 evolut) on 11/27/2019 at [facility]. Patient is in afib and V paced.     1. Left ventricle: Mildy dilated left ventricle. LVEDD 5.9 cm. Mild concentric left ventricular hypertrophy. Mildy decreased systolic function with estimated ejection fraction 40-45%.  Mild global hypokinesis. Septal wall motion consistent with RV pacing. Impaired LV relaxation consistent with at least grade I diastolic dysfunction.     2. Aortic valve and root: s/p TAVR 29mm evolut with peak aortic velocity 2 m/s. Mean gradient 8 mmHg.(apical) . At least mild paravalvular aortic valve regurgitation.  No transvalvular regurgitation.   The aortic root size is normal. The proximal ascending aorta is not well visualized.     3. Mitral valve: Mitral valve is sclerotic.. Mild mitral regurgitation.     4. Atria: Mildy dilated left atrium. Mildy dilated right atrium     5. Right Ventricle: RV mildly dilated with mildly reduced RV systolic function..     6. Tricuspid and Pulmonic valve: Tricuspid valve is thin and opens well. Mild tricuspid regurgitation with estimated right ventricular systolic pressure of 35-40 mmHg. Pulmonary valve is not well visualized. No pulmonary regurgitation.     7. IVC: IVC is normal in size with >50% collapse with respiration. There is hepatic systolic flow reversal.     8. Effusions:  No pericardial effusion.    He is well compensated at present.  He will remain on his current dose of diuretics.  He had a creatinine of 1.3 with a potassium of 4.5 on February 25, 2021.

## 2021-03-04 NOTE — PROGRESS NOTES
Cardiology Note       Reason for visit: Scheduled cardiology follow-up    Chief Complaint   Patient presents with   • S/P TAVR (transcatheter aortic valve replacement)   Permanent pacemaker  Obstructive sleep apnea  Systemic hypertension     HPI   Cain Pruitt is a 88 y.o. male who presents for Cardiac follow-up.  He was last seen in the office in August.  His son is with him today.    When we last saw the patient he had recently undergone an amputation of the second toe on his left foot due to osteomyelitis.  He did visit with infectious disease shortly after our visit and did have a PICC line placed and received 6 weeks of IV antibiotics.  He required no further treatment.  Unfortunately he was Covid positive in December.  Thankfully, he did not have to be hospitalized.  He has recovered.    He recently herniated L4 and L5 after lifting up something heavy.  Physical therapy is coming into the home a few days a week.  Therapy sessions are helping but he is not fully ambulatory yet.    He denies chest pain, shortness of breath, palpitations, lightheadedness or syncope.  He does have some slight lower extremity edema.  He spends a large portion of the day seated.  He denies bleeding on Eliquis.  He has a good appetite and sleeps well.  He does not use a CPAP.  His weight is down 7 pounds but he tells me that he gained some of his weight back.  He did lose more weight when he was sick after beatriz the Covid virus.  His bowels are functioning normally.      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    • Colon polyps    • Coronary artery disease 1991    s/p cabg    • Decreased cardiac ejection fraction 1/24/2019   • Edema    • Erectile dysfunction   "  • GI (gastrointestinal bleed)    • GI (gastrointestinal bleed) 06/2016   • Heart block 1/24/2019   • History of percutaneous coronary intervention 8/12/2019    BMS to SVG to OM 2007   • History of transfusion 2011    during colon resection had \"nicked artery\"- required multiple unitos pf blood    • Hyperlipidemia    • Hypertension    • Hypotension    • Lumbar herniated disc    • SIENA (obstructive sleep apnea) 8/12/2019   • Osteomyelitis of toe of right foot (CMS/HCC) 2016    s/p 2nd and 3rd toe osteomylitis    • Pacemaker 1/24/2019   • PAD (peripheral artery disease) (CMS/[facility]) 8/12/2019   • Peripheral neuropathy     tips of fingers  and feet    • S/P CABG (coronary artery bypass graft) 1991 1991 LIMA to LAD, ARTURO to RDA, SVG to OM   • 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    Transfemoral TAVR #29 Evolut Pro on 11-   • Sleep apnea    • Syncope     2008,2011   • Type 2 diabetes mellitus (CMS/HCC)     last HgBA1C was 7.2 in 7/2019 managed by PCP Dr Aguilar Phan   • Urinary retention     self cath's 3 times per day,    • Vitamin D deficiency      Past Surgical History:   Procedure Laterality Date   • AMPUTATION FOOT / TOE Right 2016    2nd and 3rd right foot from osteomylitis    • BOWEL RESECTION  2011   • CARDIAC PACEMAKER PLACEMENT  2013   • CARDIAC SURGERY     • CARDIOVERSION  2008   • CORONARY ARTERY BYPASS GRAFT  1991    x 4 bypasses    • CORONARY STENT PLACEMENT      3 stents   • KNEE SURGERY Right 1951   • MOHS SURGERY     • SKIN BIOPSY       Social History     Socioeconomic History   • Marital status:      Spouse name: None   • Number of children: None   • Years of education: None   • Highest education level: None   Occupational History   • None   Social Needs   • Financial resource strain: None   • Food insecurity     Worry: None     Inability: None   • Transportation needs     Medical: None     Non-medical: None   Tobacco Use   • Smoking status: Never Smoker   • " Smokeless tobacco: Never Used   Substance and Sexual Activity   • Alcohol use: Yes     Frequency: 2-4 times a month     Comment: occassionally/social   • Drug use: Defer   • Sexual activity: Defer   Lifestyle   • Physical activity     Days per week: None     Minutes per session: None   • Stress: None   Relationships   • Social connections     Talks on phone: None     Gets together: None     Attends Mandaen service: None     Active member of club or organization: None     Attends meetings of clubs or organizations: None     Relationship status: None   • Intimate partner violence     Fear of current or ex partner: None     Emotionally abused: None     Physically abused: None     Forced sexual activity: None   Other Topics Concern   • None   Social History Narrative   • None     Family History   Problem Relation Age of Onset   • Clotting disorder Biological Mother    • Leukemia Biological Father      Patient has no known allergies.  Current Outpatient Medications   Medication Sig Dispense Refill   • apixaban (ELIQUIS) 5 mg tablet Take 5 mg by mouth 2 (two) times a day. To stop 2 days pre-op      • aspirin 81 mg enteric coated tablet Take 1 tablet (81 mg total) by mouth daily. 30 tablet 0   • bumetanide (BUMEX) 0.5 mg tablet Take 0.5 mg by mouth every morning.       • cholecalciferol, vitamin D3, (cholecalciferol) 1,000 unit tablet Take 1,000 Units by mouth daily.       • cyanocobalamin (vitamin B-12) 1,000 mcg tablet Take 1,000 mcg by mouth daily.     • dutasteride (AVODART) 0.5 mg capsule Take 0.5 mg by mouth nightly.    0   • ezetimibe (ZETIA) 10 mg tablet Take 1 tablet (10 mg total) by mouth nightly. 90 tablet 3   • ferrous sulfate 325 mg (65 mg iron) tablet Take 65 mg by mouth every other day.       • ipratropium (ATROVENT) 0.03 % nasal spray Administer 0.03 % into affected nostril(s) as needed. 2 sprays by intranasal route 2-3 times every day in each nostril      • LACTOBACILLUS ACIDOPHILUS (PROBIOTIC ORAL) Take  by mouth daily.     • lisinopriL (PRINIVIL) 10 mg tablet Take 1 tablet (10 mg total) by mouth daily. 90 tablet 3   • metFORMIN (GLUCOPHAGE) 500 mg tablet Take 500 mg by mouth daily with breakfast.       • metoprolol succinate XL (TOPROL-XL) 50 mg 24 hr tablet Take 50 mg by mouth nightly.       • potassium chloride (MICRO-K) 10 mEq CR capsule Take 10 mEq by mouth every other day.       • silodosin (RAPAFLO) 8 mg capsule Take 8 mg by mouth nightly.       • traMADoL (ULTRAM) 50 mg tablet Take 50 mg by mouth daily.       No current facility-administered medications for this visit.        Review of Systems   Constitution: Positive for weight loss.        7 lbs   Cardiovascular: Positive for leg swelling.   Musculoskeletal:        Herniated disc at L4 and L5   All other systems reviewed and are negative.    Objective   Vitals:    03/04/21 1009   BP: 140/60   Pulse: 70   Resp: 16   SpO2: 99%       Physical Exam:    General Appearance:  Alert, no distress   Head:  Normocephalic, without obvious abnormality, atraumatic   Eyes:  Conjunctiva/corneas clear, EOM's intact   Neck: No thyroid enlargement. No JVD. No bruits    Lungs:   Clear to auscultation bilaterally, respirations unlabored, no rales, no wheezing   Heart:  Regular rhythm, S1 and S2 normal,  Systolic ejection murmur, rub or gallop   Abdomen:   Soft, non-tender, no masses, no organomegaly   Vascular: Pulses 2+ and symmetric all extremities, no carotid bruit or jugular vein distention   Musculoskeletal:  Skin: No injury or deformity  Skin color, texture, turgor normal, no rashes or lesions, no cyanosis or edema   Extremities: Extremities normal, atraumatic, pulses normal   Behavior/Emotional:  Neurologic: Appropriate, cooperative  No gross neurologic abnormalities.       Lab Results   Component Value Date    WBC 9.66 11/28/2019    HGB 12.6 (L) 11/28/2019     11/28/2019    CHOL 183 12/20/2017    TRIG 248 (H) 12/20/2017    HDL 42 (L) 12/20/2017    ALT 24  11/13/2019    AST 20 11/13/2019     11/28/2019    K 4.4 11/28/2019    CREATININE 1.2 11/28/2019    TSH 2.00 07/23/2016    INR 1.3 11/27/2019          ECG        ASSESSMENT/PLAN    SIENA (obstructive sleep apnea)  He does not use CPAP.    Atrial fibrillation (CMS/HCC)  He remains on Eliquis and follows up with Dr. Meyer.  He has no bleeding and has not fallen.    S/P TAVR (transcatheter aortic valve replacement)  Transfemoral TAVR #29 Evolut Pro on 11- 12/2019 echo:Aortic valve and root: s/p TAVR 29mm evolut with peak aortic velocity 2 m/s. Mean gradient 8 mmHg.(apical) . At least mild paravalvular aortic valve regurgitation.  No transvalvular regurgitation.   The aortic root size is normal. The proximal ascending aorta is not well visualized.    He feels well.  He will follow-up in the office in 6 months and have a resting echocardiogram performed at that time.    Coronary artery disease involving native coronary artery without angina pectoris  He has no obvious angina or heart failure.  He will remain on his current medications.  He will remain as active as possible. He will follow-up in the office in 6 months and have a resting echocardiogram done at that time.    Heart block  Status post pacer and he follows up with Dr. Meyer.    CHF (congestive heart failure), NYHA class III, acute on chronic, combined (CMS/HCC)  12/2019 echo:S/p transfemoral TAVR (#29 evolut) on 11/27/2019 at [facility]. Patient is in afib and V paced.     1. Left ventricle: Mildy dilated left ventricle. LVEDD 5.9 cm. Mild concentric left ventricular hypertrophy. Mildy decreased systolic function with estimated ejection fraction 40-45%.  Mild global hypokinesis. Septal wall motion consistent with RV pacing. Impaired LV relaxation consistent with at least grade I diastolic dysfunction.     2. Aortic valve and root: s/p TAVR 29mm evolut with peak aortic velocity 2 m/s. Mean gradient 8 mmHg.(apical) . At least mild paravalvular  aortic valve regurgitation.  No transvalvular regurgitation.   The aortic root size is normal. The proximal ascending aorta is not well visualized.     3. Mitral valve: Mitral valve is sclerotic.. Mild mitral regurgitation.     4. Atria: Mildy dilated left atrium. Mildy dilated right atrium     5. Right Ventricle: RV mildly dilated with mildly reduced RV systolic function..     6. Tricuspid and Pulmonic valve: Tricuspid valve is thin and opens well. Mild tricuspid regurgitation with estimated right ventricular systolic pressure of 35-40 mmHg. Pulmonary valve is not well visualized. No pulmonary regurgitation.     7. IVC: IVC is normal in size with >50% collapse with respiration. There is hepatic systolic flow reversal.     8. Effusions:  No pericardial effusion.    He is well compensated at present.  He will remain on his current dose of diuretics.  He had a creatinine of 1.3 with a potassium of 4.5 on February 25, 2021.    Hypertension  Under fair control today on his current medications.  They will remain unchanged.    Type 2 diabetes mellitus (CMS/HCC)  He is on oral therapy and follows up with his primary care physician regarding his diabetes.    Hyperlipidemia  He is on lipid-lowering therapy which he tolerates.  Laboratory studies were drawn on February 25, 2021.  Total cholesterol 170, triglycerides 196, HDL 40 with an LDL of 91.    S/P CABG (coronary artery bypass graft)  1991 LIMA to LAD, ARTURO to RDA, SVG to OM    We will follow up with him in the office in 6 months.  He will have a resting echocardiogram at that time.  He knows to contact us prior to then with any signs or symptoms of concern.      By signing my name below, I, Melania Padgett, attest that this documentation has been prepared under the direction and in the presence of Efe Mejia MD.     ROSALIO Cuadra  3/4/2021     The patient was seen and examined.  An independent interval history was obtained and physical exam performed.  I  agree with the information and findings as documented above.    Doing very well overall status post transcatheter therapy a little over a year ago.  No symptoms of heart failure, angina or arrhythmia.  From a cardiac perspective, no limitations were placed on his activity.  No medication changes advised.  I have asked him to return in 6 months at which time a repeat echocardiogram will be obtained in follow-up to his valvular intervention.    Efe Mejia MD

## 2021-03-05 NOTE — TELEPHONE ENCOUNTER
Spoke to patient change appointments for him to see Dr. Meyer on a Tuesday. Echo, Dr. Barrett  on Sept 7, 21 same day since patient comes a long was. sw

## 2021-03-10 ENCOUNTER — IMMUNIZATION (OUTPATIENT)
Dept: IMMUNIZATION | Facility: CLINIC | Age: 86
End: 2021-03-10

## 2021-03-31 ENCOUNTER — IMMUNIZATION (OUTPATIENT)
Dept: IMMUNIZATION | Facility: CLINIC | Age: 86
End: 2021-03-31

## 2021-04-28 ENCOUNTER — TELEPHONE (OUTPATIENT)
Dept: SCHEDULING | Facility: CLINIC | Age: 86
End: 2021-04-28

## 2021-04-28 NOTE — TELEPHONE ENCOUNTER
Neither Malissa nor I called pt.    Will check with [facility] team.    [facility] PM.    We received a DL this morning.

## 2021-04-28 NOTE — TELEPHONE ENCOUNTER
Pt's son, Pavel returning Call from EP regarding the patients Device.     Pavel states his fathers received a call to [employer] on [employer] and is not sure which staff member called.     Pavel can be reached @ [phone].     CH

## 2021-04-28 NOTE — TELEPHONE ENCOUNTER
Returned call to son, Pavel, neither Malissa nor MARIA ESTHER as well as the [facility] staff called pt.    Data from [facility] PM transmitted this morning.

## 2021-09-03 ENCOUNTER — TELEPHONE (OUTPATIENT)
Dept: CARDIOLOGY | Facility: CLINIC | Age: 86
End: 2021-09-03

## 2021-09-07 ENCOUNTER — OFFICE VISIT (OUTPATIENT)
Dept: CARDIOLOGY | Facility: CLINIC | Age: 86
End: 2021-09-07
Payer: MEDICARE

## 2021-09-07 ENCOUNTER — HOSPITAL ENCOUNTER (OUTPATIENT)
Dept: CARDIOLOGY | Facility: HOSPITAL | Age: 86
Discharge: HOME | End: 2021-09-07
Attending: INTERNAL MEDICINE
Payer: MEDICARE

## 2021-09-07 VITALS — BODY MASS INDEX: 27.34 KG/M2 | WEIGHT: 213 LBS | HEIGHT: 74 IN

## 2021-09-07 VITALS
HEIGHT: 74 IN | BODY MASS INDEX: 27.37 KG/M2 | DIASTOLIC BLOOD PRESSURE: 72 MMHG | SYSTOLIC BLOOD PRESSURE: 130 MMHG | RESPIRATION RATE: 16 BRPM

## 2021-09-07 VITALS
WEIGHT: 213.2 LBS | RESPIRATION RATE: 16 BRPM | OXYGEN SATURATION: 98 % | HEART RATE: 70 BPM | DIASTOLIC BLOOD PRESSURE: 68 MMHG | SYSTOLIC BLOOD PRESSURE: 122 MMHG | HEIGHT: 74 IN | BODY MASS INDEX: 27.36 KG/M2

## 2021-09-07 DIAGNOSIS — I45.9 HEART BLOCK: Chronic | ICD-10-CM

## 2021-09-07 DIAGNOSIS — I48.91 ATRIAL FIBRILLATION, UNSPECIFIED TYPE (CMS/HCC): Chronic | ICD-10-CM

## 2021-09-07 DIAGNOSIS — Z95.0 PACEMAKER: ICD-10-CM

## 2021-09-07 DIAGNOSIS — I50.43 CHF (CONGESTIVE HEART FAILURE), NYHA CLASS III, ACUTE ON CHRONIC, COMBINED (CMS/HCC): ICD-10-CM

## 2021-09-07 DIAGNOSIS — Z95.2 S/P TAVR (TRANSCATHETER AORTIC VALVE REPLACEMENT): Primary | ICD-10-CM

## 2021-09-07 DIAGNOSIS — Z95.2 S/P TAVR (TRANSCATHETER AORTIC VALVE REPLACEMENT): ICD-10-CM

## 2021-09-07 DIAGNOSIS — Z95.2 S/P TAVR (TRANSCATHETER AORTIC VALVE REPLACEMENT): Primary | Chronic | ICD-10-CM

## 2021-09-07 DIAGNOSIS — I10 ESSENTIAL HYPERTENSION: Chronic | ICD-10-CM

## 2021-09-07 LAB
AORTIC ROOT ANNULUS - M-MODE: 2.9 CM
AORTIC VALVE MEAN VELOCITY: 1.05 M/S
AORTIC VALVE VELOCITY TIME INTEGRAL: 33.5 CM
AV MEAN GRADIENT: 6 MMHG
AV PEAK GRADIENT: 15 MMHG
AV PEAK VELOCITY-S: 1.93 M/S
AV REG PEAK VEL: 5.03 M/S
AV REGURGITATION PRESSURE HALF TIME: 401 MS
AV VALVE AREA: 1.85 CM2
AV VALVE AREA: 1.99 CM2
BSA FOR ECHO PROCEDURE: 2.25 M2
DOP CALC LVOT STROKE VOLUME: 66.81 ML
E WAVE DECELERATION TIME: 175 MS
E/E' RATIO: 22.3
E/LAT E' RATIO: 17.6
EDV (BP): 104 CM3
EF (A4C): 43.4 %
EF A2C: 42 %
EJECTION FRACTION: 43.4 %
EST RIGHT VENT SYSTOLIC PRESSURE BY TRICUSPID REGURGITATION JET: 54 MMHG
ESV (BP): 58.9 CM3
FRACTIONAL SHORTENING: 20.6 %
INTERVENTRICULAR SEPTUM: 0.71 CM
LA ESV (BP): 113 CM3
LA ESV INDEX (A2C): 53.33 CM3/M2
LA ESV INDEX (BP): 50.22 CM3/M2
LA/AORTA RATIO: 2.03
LAAS-AP2: 31 CM2
LAAS-AP4: 29.2 CM2
LAD 2D - M-MODE: 5.9 CM
LALD A4C: 6.61 CM
LALD A4C: 7.06 CM
LAV-S: 120 CM3
LEFT ATRIUM VOLUME INDEX: 44.31 CM3/M2
LEFT ATRIUM VOLUME: 99.7 CM3
LEFT INTERNAL DIMENSION IN SYSTOLE: 4.04 CM (ref 3.88–5.88)
LEFT VENTRICLE DIASTOLIC VOLUME INDEX: 52.44 CM3/M2
LEFT VENTRICLE DIASTOLIC VOLUME: 118 CM3
LEFT VENTRICLE SYSTOLIC VOLUME INDEX: 29.69 CM3/M2
LEFT VENTRICLE SYSTOLIC VOLUME: 66.8 CM3
LEFT VENTRICULAR INTERNAL DIMENSION IN DIASTOLE: 5.09 CM (ref 6.69–9.29)
LEFT VENTRICULAR POSTERIOR WALL IN END DIASTOLE: 1.34 CM (ref 0.81–1.51)
LV DIASTOLIC VOLUME: 90.9 CM3
LV ESV (APICAL 2 CHAMBER): 52.7 CM3
LVAD-AP2: 30.6 CM2
LVAD-AP4: 34.8 CM2
LVAS-AP2: 23 CM2
LVAS-AP4: 26.2 CM2
LVEDVI(A2C): 40.4 CM3/M2
LVEDVI(BP): 46.22 CM3/M2
LVESVI(A2C): 23.42 CM3/M2
LVESVI(BP): 26.18 CM3/M2
LVLD-AP2: 8.73 CM
LVLD-AP4: 8.61 CM
LVLS-AP2: 8.5 CM
LVLS-AP4: 8.5 CM
LVOT 2D: 2.1 CM
LVOT A: 3.46 CM2
LVOT MG: 2 MMHG
LVOT MV: 0.61 M/S
LVOT PEAK VELOCITY: 1.03 M/S
LVOT VTI: 19.3 CM
MV E'TISSUE VEL-LAT: 0.07 M/S
MV E'TISSUE VEL-MED: 0.06 M/S
MV PEAK E VEL: 1.31 M/S
MV VALVE AREA P 1/2 METHOD: 4.31 CM2
POSTERIOR WALL: 1.34 CM
RVOT VMAX: 0.86 M/S
RVOT VTI: 15.5 CM
TR MAX PG: 50 MMHG
TRICUSPID VALVE PEAK REGURGITATION VELOCITY: 3.54 M/S
Z-SCORE OF LEFT VENTRICULAR DIMENSION IN END DIASTOLE: -4.37
Z-SCORE OF LEFT VENTRICULAR DIMENSION IN END SYSTOLE: -1.33
Z-SCORE OF LEFT VENTRICULAR POSTERIOR WALL IN END DIASTOLE: 1

## 2021-09-07 PROCEDURE — 93306 TTE W/DOPPLER COMPLETE: CPT | Mod: 26 | Performed by: INTERNAL MEDICINE

## 2021-09-07 PROCEDURE — 93000 ELECTROCARDIOGRAM COMPLETE: CPT | Performed by: INTERNAL MEDICINE

## 2021-09-07 PROCEDURE — 99214 OFFICE O/P EST MOD 30 MIN: CPT | Mod: 25 | Performed by: INTERNAL MEDICINE

## 2021-09-07 PROCEDURE — 99214 OFFICE O/P EST MOD 30 MIN: CPT | Performed by: INTERNAL MEDICINE

## 2021-09-07 PROCEDURE — 93306 TTE W/DOPPLER COMPLETE: CPT

## 2021-09-07 ASSESSMENT — ENCOUNTER SYMPTOMS
CHANGE IN BOWEL HABIT: 0
BLOATING: 0
DIZZINESS: 0
BRUISES/BLEEDS EASILY: 0
BACK PAIN: 1
FEVER: 0
WEIGHT GAIN: 0
DECREASED APPETITE: 0
ENDOCRINE NEGATIVE: 1
WEAKNESS: 0
LIGHT-HEADEDNESS: 0
PSYCHIATRIC NEGATIVE: 1
ALLERGIC/IMMUNOLOGIC NEGATIVE: 1
BLURRED VISION: 0
SLEEP DISTURBANCES DUE TO BREATHING: 0
WEIGHT LOSS: 0
FLANK PAIN: 0
COUGH: 0
HEMATURIA: 0
HEARTBURN: 0

## 2021-09-07 NOTE — PROGRESS NOTES
"   Cardiology Note       Reason for visit: Scheduled cardiology follow-up    Chief Complaint   Patient presents with   • Coronary Artery Disease   Permanent pacemaker  Obstructive sleep apnea  Systemic hypertension  Status post transcatheter aortic valve replacement November 27, 2019.     HPI   Cain Pruitt is a 89 y.o. male who presents for cardiac follow-up.  He voices no cardiovascular complaints and states that his breathing is comfortable, his level of activity acceptable, limited only by the problems with his low back.  No symptoms of PND.  No orthopnea.  No ankle edema.  No symptoms of angina.    He continues with physical therapy and also received several low back injections.  He is improving slowly.    He denies bleeding on Eliquis.  He has a good appetite and sleeps well.  He does not use a CPAP.  His bowels are functioning normally.      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    • Colon polyps    • Coronary artery disease 1991    s/p cabg    • Decreased cardiac ejection fraction 1/24/2019   • Edema    • Erectile dysfunction    • GI (gastrointestinal bleed)    • GI (gastrointestinal bleed) 06/2016   • Heart block 1/24/2019   • History of percutaneous coronary intervention 8/12/2019    BMS to SVG to OM 2007   • History of transfusion 2011    during colon resection had \"nicked artery\"- required multiple unitos pf blood    • Hyperlipidemia    • Hypertension    • Hypotension    • Lumbar herniated disc    • SIENA (obstructive sleep apnea) 8/12/2019   • Osteomyelitis of toe of right foot (CMS/[facility]) 2016    s/p 2nd and 3rd toe osteomylitis    • Pacemaker 1/24/2019   • PAD (peripheral artery disease) (CMS/[facility]) 8/12/2019   • Peripheral neuropathy     " tips of fingers  and feet    • S/P CABG (coronary artery bypass graft) 1991 1991 LIMA to LAD, ARTURO to RDA, SVG to OM   • 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    Transfemoral TAVR #29 Evolut Pro on 11-   • Sleep apnea    • Syncope     2008,2011   • Type 2 diabetes mellitus (CMS/HCC)     last HgBA1C was 7.2 in 7/2019 managed by PCP Dr Aguilar Phan   • Urinary retention     self cath's 3 times per day,    • Vitamin D deficiency      Past Surgical History:   Procedure Laterality Date   • AMPUTATION FOOT / TOE Right 2016    2nd and 3rd right foot from osteomylitis    • BOWEL RESECTION  2011   • CARDIAC PACEMAKER PLACEMENT  2013   • CARDIAC SURGERY     • CARDIOVERSION  2008   • CORONARY ARTERY BYPASS GRAFT  1991    x 4 bypasses    • CORONARY STENT PLACEMENT      3 stents   • KNEE SURGERY Right 1951   • MOHS SURGERY     • SKIN BIOPSY       Social History     Socioeconomic History   • Marital status:      Spouse name: None   • Number of children: None   • Years of education: None   • Highest education level: None   Occupational History   • None   Tobacco Use   • Smoking status: Never Smoker   • Smokeless tobacco: Never Used   Substance and Sexual Activity   • Alcohol use: Yes     Comment: occassionally/social   • Drug use: Defer   • Sexual activity: Defer   Other Topics Concern   • None   Social History Narrative   • None     Social Determinants of Health     Financial Resource Strain:    • Difficulty of Paying Living Expenses:    Food Insecurity:    • Worried About Running Out of Food in the Last Year:    • Ran Out of Food in the Last Year:    Transportation Needs:    • Lack of Transportation (Medical):    • Lack of Transportation (Non-Medical):    Physical Activity:    • Days of Exercise per Week:    • Minutes of Exercise per Session:    Stress:    • Feeling of Stress :    Social Connections:    • Frequency of Communication with Friends and Family:    • Frequency of Social  Gatherings with Friends and Family:    • Attends Gnosticist Services:    • Active Member of Clubs or Organizations:    • Attends Club or Organization Meetings:    • Marital Status:    Intimate Partner Violence:    • Fear of Current or Ex-Partner:    • Emotionally Abused:    • Physically Abused:    • Sexually Abused:      Family History   Problem Relation Age of Onset   • Clotting disorder Biological Mother    • Leukemia Biological Father      Patient has no known allergies.  Current Outpatient Medications   Medication Sig Dispense Refill   • apixaban (ELIQUIS) 5 mg tablet Take 5 mg by mouth 2 (two) times a day. To stop 2 days pre-op      • aspirin 81 mg enteric coated tablet Take 1 tablet (81 mg total) by mouth daily. 30 tablet 0   • bumetanide (BUMEX) 0.5 mg tablet Take 0.5 mg by mouth every morning.       • cholecalciferol, vitamin D3, (cholecalciferol) 1,000 unit tablet Take 1,000 Units by mouth daily.       • cyanocobalamin (vitamin B-12) 1,000 mcg tablet Take 1,000 mcg by mouth daily.     • dutasteride (AVODART) 0.5 mg capsule Take 0.5 mg by mouth nightly.    0   • ezetimibe (ZETIA) 10 mg tablet Take 1 tablet (10 mg total) by mouth nightly. 90 tablet 3   • ferrous sulfate 325 mg (65 mg iron) tablet Take 65 mg by mouth every other day.       • ipratropium (ATROVENT) 0.03 % nasal spray Administer 0.03 % into affected nostril(s) as needed. 2 sprays by intranasal route 2-3 times every day in each nostril      • LACTOBACILLUS ACIDOPHILUS (PROBIOTIC ORAL) Take by mouth daily.     • lisinopriL (PRINIVIL) 10 mg tablet Take 1 tablet (10 mg total) by mouth daily. 90 tablet 3   • metFORMIN (GLUCOPHAGE) 500 mg tablet Take 500 mg by mouth daily with breakfast.       • metoprolol succinate XL (TOPROL-XL) 50 mg 24 hr tablet Take 50 mg by mouth nightly.       • potassium chloride (MICRO-K) 10 mEq CR capsule Take 10 mEq by mouth every other day.       • silodosin (RAPAFLO) 8 mg capsule Take 8 mg by mouth nightly.       •  traMADoL (ULTRAM) 50 mg tablet Take 50 mg by mouth daily.       No current facility-administered medications for this visit.       Review of Systems   Constitutional: Negative for decreased appetite, fever, weight gain and weight loss.   HENT: Negative for congestion and nosebleeds.    Eyes: Negative for blurred vision and visual disturbance.   Cardiovascular: Negative for chest pain and leg swelling.   Respiratory: Negative for cough and sleep disturbances due to breathing.    Endocrine: Negative.    Hematologic/Lymphatic: Negative for bleeding problem. Does not bruise/bleed easily.   Musculoskeletal: Positive for arthritis, back pain and joint pain.        Herniated disc at L4 and L5   Gastrointestinal: Negative for bloating, change in bowel habit and heartburn.   Genitourinary: Negative for flank pain and hematuria.   Neurological: Negative for dizziness, light-headedness and weakness.   Psychiatric/Behavioral: Negative.    Allergic/Immunologic: Negative.      Objective      Weight 213.2 pounds  Blood pressure 136/68 seated position left arm.  Resting room air saturation 98% with a respiratory rate of 16/min.  Pulse 70 bpm and regular.    Physical Exam:    General Appearance:  Alert, no distress   Head:  Normocephalic, without obvious abnormality, atraumatic   Eyes:  Conjunctiva/corneas clear, EOM's intact   Neck: No thyroid enlargement. No JVD. No bruits    Lungs:   Clear to auscultation bilaterally, respirations unlabored, no rales, no wheezing   Heart:  Regular rhythm, S1 and S2 normal,  Systolic ejection murmur, rub or gallop   Abdomen:   Soft, non-tender, no masses, no organomegaly   Vascular: Pulses 2+ and symmetric all extremities, no carotid bruit or jugular vein distention   Musculoskeletal:  Skin: No injury or deformity  Skin color, texture, turgor normal, no rashes or lesions, no cyanosis or edema   Extremities: Extremities normal, atraumatic, pulses normal   Behavior/Emotional:  Neurologic:  Appropriate, cooperative  No gross neurologic abnormalities.       Lab Results   Component Value Date    WBC 9.66 11/28/2019    HGB 12.6 (L) 11/28/2019     11/28/2019    CHOL 183 12/20/2017    TRIG 248 (H) 12/20/2017    HDL 42 (L) 12/20/2017    ALT 24 11/13/2019    AST 20 11/13/2019     11/28/2019    K 4.4 11/28/2019    CREATININE 1.2 11/28/2019    TSH 2.00 07/23/2016    INR 1.3 11/27/2019      Echocardiogram September 7, 2021  Summary:     1.  Technically adequate study.  2.  The rhythm appears to be atrial fibrillation with electronic ventricular pacing.  3.  Mildly dilated left ventricle with overall mildly diminished left ventricular systolic function.  Ejection fraction estimated at 40 to 45%.  Abnormal wall motion secondary to a paced rhythm.  4.  Status post transcatheter heart valve with a #29 Medtronic device in the aortic position.  No significant aortic stenosis.  Mild paravalvular aortic insufficiency.  Estimated aortic valve area 1.7 to 1.8 cm².  5.  Moderately dilated left atrium with mild mitral annular calcification and trace to mild mitral regurgitation.  6.  Trace pulmonic insufficiency.  7.  Normal left ventricular cavity size with normal wall thickness and preserved systolic function.  Pacemaker leads are present in the right ventricle and right atrium.  8.  Trace to mild tricuspid regurgitation.  9.  Compared to a study of December 17, 2019, no significant change.     ECG        ASSESSMENT/PLAN    SIENA (obstructive sleep apnea)  He does not use CPAP.     Atrial fibrillation (CMS/HCC)/Heart block/status post permanent pacemaker.  He remains on Eliquis and follows up with Dr. Meyer.  He has no bleeding and has not fallen.      S/P TAVR (transcatheter aortic valve replacement)  Transfemoral TAVR #29 Evolut Pro on 11-.  Clinically he continues to do well.  No specific complaints to suggest congestive heart failure.  Today's echo was reviewed in detail and shows stability of  cardiac structure and function and the function of the transcatheter heart valve compared to the previous study.     Coronary artery disease involving native coronary artery without angina pectoris/S/P CABG (coronary artery bypass graft)/CHF (congestive heart failure), NYHA class III, acute on chronic, combined (CMS/[facility])  1991 LIMA to LAD, ARTURO to RDA, SVG to OM  He has no obvious angina or heart failure.  He will remain on his current medications.  His ejection fraction has not declined.  Sodium limitation his diet was discussed.  No limits were placed on his activity.    Hypertension  Under fair control today on his current medications.  They will remain unchanged.     Type 2 diabetes mellitus (CMS/[facility])  He is on oral therapy and follows up with his primary care physician regarding his diabetes.     Hyperlipidemia  He is on lipid-lowering therapy which he tolerates.  Laboratory studies were drawn on February 25, 2021.  Total cholesterol 170, triglycerides 196, HDL 40 with an LDL of 91.    In summary then, he continues to do well status post coronary bypass graft surgery as well as transcatheter intervention for severe symptomatic aortic stenosis in the face of mildly diminished LV function.  No new medications advised.  No restrictions placed on his activity.  Follow-up will be in 6 months.     Efe Mejia MD  9/7/2021

## 2021-09-07 NOTE — LETTER
September 7, 2021     Aguilar Phan MD  [address] [facility] [place] [address]    Patient: Cain Pruitt  YOB: 1932  Date of Visit: 9/7/2021      Dear Dr. Phan:    Thank you for referring Cain Pruitt to me for evaluation. Below are my notes for this consultation.    If you have questions, please do not hesitate to call me. I look forward to following your patient along with you.         Sincerely,        Efe Mejia MD        CC: MD Bryant Andre MD Coady, Paul M, MD  9/7/2021  2:37 PM  Signed     Cardiology Note       Reason for visit: Scheduled cardiology follow-up    Chief Complaint   Patient presents with   • Coronary Artery Disease   Permanent pacemaker  Obstructive sleep apnea  Systemic hypertension  Status post transcatheter aortic valve replacement November 27, 2019.     HPI   Cain Pruitt is a 89 y.o. male who presents for cardiac follow-up.  He voices no cardiovascular complaints and states that his breathing is comfortable, his level of activity acceptable, limited only by the problems with his low back.  No symptoms of PND.  No orthopnea.  No ankle edema.  No symptoms of angina.    He continues with physical therapy and also received several low back injections.  He is improving slowly.    He denies bleeding on Eliquis.  He has a good appetite and sleeps well.  He does not use a CPAP.  His bowels are functioning normally.      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    • Colon polyps    • Coronary artery disease 1991    s/p cabg    • Decreased cardiac ejection fraction 1/24/2019   • Edema    • Erectile dysfunction    • GI (gastrointestinal bleed)    • GI (gastrointestinal bleed)  "06/2016   • Heart block 1/24/2019   • History of percutaneous coronary intervention 8/12/2019    BMS to SVG to OM 2007   • History of transfusion 2011    during colon resection had \"nicked artery\"- required multiple unitos pf blood    • Hyperlipidemia    • Hypertension    • Hypotension    • Lumbar herniated disc    • SIENA (obstructive sleep apnea) 8/12/2019   • Osteomyelitis of toe of right foot (CMS/HCC) 2016    s/p 2nd and 3rd toe osteomylitis    • Pacemaker 1/24/2019   • PAD (peripheral artery disease) (CMS/[facility]) 8/12/2019   • Peripheral neuropathy     tips of fingers  and feet    • S/P CABG (coronary artery bypass graft) 1991 1991 LIMA to LAD, ARTURO to RDA, SVG to OM   • 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    Transfemoral TAVR #29 Evolut Pro on 11-   • Sleep apnea    • Syncope     2008,2011   • Type 2 diabetes mellitus (CMS/HCC)     last HgBA1C was 7.2 in 7/2019 managed by PCP Dr Aguilar Phan   • Urinary retention     self cath's 3 times per day,    • Vitamin D deficiency      Past Surgical History:   Procedure Laterality Date   • AMPUTATION FOOT / TOE Right 2016    2nd and 3rd right foot from osteomylitis    • BOWEL RESECTION  2011   • CARDIAC PACEMAKER PLACEMENT  2013   • CARDIAC SURGERY     • CARDIOVERSION  2008   • CORONARY ARTERY BYPASS GRAFT  1991    x 4 bypasses    • CORONARY STENT PLACEMENT      3 stents   • KNEE SURGERY Right 1951   • MOHS SURGERY     • SKIN BIOPSY       Social History     Socioeconomic History   • Marital status:      Spouse name: None   • Number of children: None   • Years of education: None   • Highest education level: None   Occupational History   • None   Tobacco Use   • Smoking status: Never Smoker   • Smokeless tobacco: Never Used   Substance and Sexual Activity   • Alcohol use: Yes     Comment: occassionally/social   • Drug use: Defer   • Sexual activity: Defer   Other Topics Concern   • None   Social History Narrative   • None     Social " Determinants of Health     Financial Resource Strain:    • Difficulty of Paying Living Expenses:    Food Insecurity:    • Worried About Running Out of Food in the Last Year:    • Ran Out of Food in the Last Year:    Transportation Needs:    • Lack of Transportation (Medical):    • Lack of Transportation (Non-Medical):    Physical Activity:    • Days of Exercise per Week:    • Minutes of Exercise per Session:    Stress:    • Feeling of Stress :    Social Connections:    • Frequency of Communication with Friends and Family:    • Frequency of Social Gatherings with Friends and Family:    • Attends Scientologist Services:    • Active Member of Clubs or Organizations:    • Attends Club or Organization Meetings:    • Marital Status:    Intimate Partner Violence:    • Fear of Current or Ex-Partner:    • Emotionally Abused:    • Physically Abused:    • Sexually Abused:      Family History   Problem Relation Age of Onset   • Clotting disorder Biological Mother    • Leukemia Biological Father      Patient has no known allergies.  Current Outpatient Medications   Medication Sig Dispense Refill   • apixaban (ELIQUIS) 5 mg tablet Take 5 mg by mouth 2 (two) times a day. To stop 2 days pre-op      • aspirin 81 mg enteric coated tablet Take 1 tablet (81 mg total) by mouth daily. 30 tablet 0   • bumetanide (BUMEX) 0.5 mg tablet Take 0.5 mg by mouth every morning.       • cholecalciferol, vitamin D3, (cholecalciferol) 1,000 unit tablet Take 1,000 Units by mouth daily.       • cyanocobalamin (vitamin B-12) 1,000 mcg tablet Take 1,000 mcg by mouth daily.     • dutasteride (AVODART) 0.5 mg capsule Take 0.5 mg by mouth nightly.    0   • ezetimibe (ZETIA) 10 mg tablet Take 1 tablet (10 mg total) by mouth nightly. 90 tablet 3   • ferrous sulfate 325 mg (65 mg iron) tablet Take 65 mg by mouth every other day.       • ipratropium (ATROVENT) 0.03 % nasal spray Administer 0.03 % into affected nostril(s) as needed. 2 sprays by intranasal route 2-3  times every day in each nostril      • LACTOBACILLUS ACIDOPHILUS (PROBIOTIC ORAL) Take by mouth daily.     • lisinopriL (PRINIVIL) 10 mg tablet Take 1 tablet (10 mg total) by mouth daily. 90 tablet 3   • metFORMIN (GLUCOPHAGE) 500 mg tablet Take 500 mg by mouth daily with breakfast.       • metoprolol succinate XL (TOPROL-XL) 50 mg 24 hr tablet Take 50 mg by mouth nightly.       • potassium chloride (MICRO-K) 10 mEq CR capsule Take 10 mEq by mouth every other day.       • silodosin (RAPAFLO) 8 mg capsule Take 8 mg by mouth nightly.       • traMADoL (ULTRAM) 50 mg tablet Take 50 mg by mouth daily.       No current facility-administered medications for this visit.       Review of Systems   Constitutional: Negative for decreased appetite, fever, weight gain and weight loss.   HENT: Negative for congestion and nosebleeds.    Eyes: Negative for blurred vision and visual disturbance.   Cardiovascular: Negative for chest pain and leg swelling.   Respiratory: Negative for cough and sleep disturbances due to breathing.    Endocrine: Negative.    Hematologic/Lymphatic: Negative for bleeding problem. Does not bruise/bleed easily.   Musculoskeletal: Positive for arthritis, back pain and joint pain.        Herniated disc at L4 and L5   Gastrointestinal: Negative for bloating, change in bowel habit and heartburn.   Genitourinary: Negative for flank pain and hematuria.   Neurological: Negative for dizziness, light-headedness and weakness.   Psychiatric/Behavioral: Negative.    Allergic/Immunologic: Negative.      Objective      Weight 213.2 pounds  Blood pressure 136/68 seated position left arm.  Resting room air saturation 98% with a respiratory rate of 16/min.  Pulse 70 bpm and regular.    Physical Exam:    General Appearance:  Alert, no distress   Head:  Normocephalic, without obvious abnormality, atraumatic   Eyes:  Conjunctiva/corneas clear, EOM's intact   Neck: No thyroid enlargement. No JVD. No bruits    Lungs:   Clear to  auscultation bilaterally, respirations unlabored, no rales, no wheezing   Heart:  Regular rhythm, S1 and S2 normal,  Systolic ejection murmur, rub or gallop   Abdomen:   Soft, non-tender, no masses, no organomegaly   Vascular: Pulses 2+ and symmetric all extremities, no carotid bruit or jugular vein distention   Musculoskeletal:  Skin: No injury or deformity  Skin color, texture, turgor normal, no rashes or lesions, no cyanosis or edema   Extremities: Extremities normal, atraumatic, pulses normal   Behavior/Emotional:  Neurologic: Appropriate, cooperative  No gross neurologic abnormalities.       Lab Results   Component Value Date    WBC 9.66 11/28/2019    HGB 12.6 (L) 11/28/2019     11/28/2019    CHOL 183 12/20/2017    TRIG 248 (H) 12/20/2017    HDL 42 (L) 12/20/2017    ALT 24 11/13/2019    AST 20 11/13/2019     11/28/2019    K 4.4 11/28/2019    CREATININE 1.2 11/28/2019    TSH 2.00 07/23/2016    INR 1.3 11/27/2019      Echocardiogram September 7, 2021  Summary:     1.  Technically adequate study.  2.  The rhythm appears to be atrial fibrillation with electronic ventricular pacing.  3.  Mildly dilated left ventricle with overall mildly diminished left ventricular systolic function.  Ejection fraction estimated at 40 to 45%.  Abnormal wall motion secondary to a paced rhythm.  4.  Status post transcatheter heart valve with a #29 Medtronic device in the aortic position.  No significant aortic stenosis.  Mild paravalvular aortic insufficiency.  Estimated aortic valve area 1.7 to 1.8 cm².  5.  Moderately dilated left atrium with mild mitral annular calcification and trace to mild mitral regurgitation.  6.  Trace pulmonic insufficiency.  7.  Normal left ventricular cavity size with normal wall thickness and preserved systolic function.  Pacemaker leads are present in the right ventricle and right atrium.  8.  Trace to mild tricuspid regurgitation.  9.  Compared to a study of December 17, 2019, no significant  change.     ECG        ASSESSMENT/PLAN    SIENA (obstructive sleep apnea)  He does not use CPAP.     Atrial fibrillation (CMS/[facility])/Heart block/status post permanent pacemaker.  He remains on Eliquis and follows up with Dr. Meyer.  He has no bleeding and has not fallen.      S/P TAVR (transcatheter aortic valve replacement)  Transfemoral TAVR #29 Evolut Pro on 11-.  Clinically he continues to do well.  No specific complaints to suggest congestive heart failure.  Today's echo was reviewed in detail and shows stability of cardiac structure and function and the function of the transcatheter heart valve compared to the previous study.     Coronary artery disease involving native coronary artery without angina pectoris/S/P CABG (coronary artery bypass graft)/CHF (congestive heart failure), NYHA class III, acute on chronic, combined (CMS/[facility])  1991 LIMA to LAD, ARTURO to RDA, SVG to OM  He has no obvious angina or heart failure.  He will remain on his current medications.  His ejection fraction has not declined.  Sodium limitation his diet was discussed.  No limits were placed on his activity.    Hypertension  Under fair control today on his current medications.  They will remain unchanged.     Type 2 diabetes mellitus (CMS/[facility])  He is on oral therapy and follows up with his primary care physician regarding his diabetes.     Hyperlipidemia  He is on lipid-lowering therapy which he tolerates.  Laboratory studies were drawn on February 25, 2021.  Total cholesterol 170, triglycerides 196, HDL 40 with an LDL of 91.    In summary then, he continues to do well status post coronary bypass graft surgery as well as transcatheter intervention for severe symptomatic aortic stenosis in the face of mildly diminished LV function.  No new medications advised.  No restrictions placed on his activity.  Follow-up will be in 6 months.     Efe Mejia MD  9/7/2021

## 2021-09-08 ASSESSMENT — ENCOUNTER SYMPTOMS
IRREGULAR HEARTBEAT: 0
DYSPNEA ON EXERTION: 0
PALPITATIONS: 0
COUGH: 0
PARESTHESIAS: 0
SHORTNESS OF BREATH: 0
ABDOMINAL PAIN: 0
PND: 0
TREMORS: 0
ALTERED MENTAL STATUS: 0
WEAKNESS: 0
NEAR-SYNCOPE: 0
ORTHOPNEA: 0
SYNCOPE: 0
HEMATURIA: 0
FOCAL WEAKNESS: 0

## 2021-09-08 NOTE — ASSESSMENT & PLAN NOTE
Status post transcatheter heart valve with a #29 Medtronic device in the aortic position.  No significant aortic stenosis.  Mild paravalvular aortic insufficiency.  Estimated aortic valve area 1.7 to 1.8 cm².

## 2021-09-08 NOTE — ASSESSMENT & PLAN NOTE
[employer]roni[employer] Evia single-chamber pacemaker implanted September 16, 2013.  Remaining battery longevity is 2 yr/3 mos.   Parameter summary is VVI-CLS 60 bpm.  R wave is 21.3 mV, threshold 1.9 V at 0.4 ms and lead impedance 526 ohms.  97% ventricular pacing.  We will continue to monitor him closely.

## 2021-09-08 NOTE — PROGRESS NOTES
"     Electrophysiology  Outpatient Progress Note       Reason for visit:   Chief Complaint   Patient presents with   • Device Check       HPI   Cain Pruitt is a 89 y.o. male who is seen today in this office for follow up of chronic permanent atrial fibrillation and sick sinus syndrome.  He was last seen in this office July 2019.  He has a permanent pacemaker.  He comes to the appointment today with his son.    He has a history of coronary heart disease status post percutaneous coronary intervention and bypass graft surgery.         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    • Colon polyps    • Coronary artery disease 1991    s/p cabg    • Decreased cardiac ejection fraction 1/24/2019   • Edema    • Erectile dysfunction    • GI (gastrointestinal bleed)    • GI (gastrointestinal bleed) 06/2016   • Heart block 1/24/2019   • History of percutaneous coronary intervention 8/12/2019    BMS to SVG to OM 2007   • History of transfusion 2011    during colon resection had \"nicked artery\"- required multiple unitos pf blood    • Hyperlipidemia    • Hypertension    • Hypotension    • Lumbar herniated disc    • SIENA (obstructive sleep apnea) 8/12/2019   • Osteomyelitis of toe of right foot (CMS/[facility]) 2016    s/p 2nd and 3rd toe osteomylitis    • Pacemaker 1/24/2019   • PAD (peripheral artery disease) (CMS/[facility]) 8/12/2019   • Peripheral neuropathy     tips of fingers  and feet    • S/P CABG (coronary artery bypass graft) 1991 1991 LIMA to LAD, ARTURO to RDA, SVG to OM   • 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    Transfemoral TAVR #29 Evolut Pro on 11-   • Sleep apnea    • Syncope     2008,2011   • Type 2 diabetes mellitus (CMS/[facility])     " last HgBA1C was 7.2 in 7/2019 managed by PCP Dr Aguilar Phan   • Urinary retention     self cath's 3 times per day,    • Vitamin D deficiency      Past Surgical History:   Procedure Laterality Date   • AMPUTATION FOOT / TOE Right 2016    2nd and 3rd right foot from osteomylitis    • BOWEL RESECTION  2011   • CARDIAC PACEMAKER PLACEMENT  2013   • CARDIAC SURGERY     • CARDIOVERSION  2008   • CORONARY ARTERY BYPASS GRAFT  1991    x 4 bypasses    • CORONARY STENT PLACEMENT      3 stents   • KNEE SURGERY Right 1951   • MOHS SURGERY     • SKIN BIOPSY         Social History     Tobacco Use   • Smoking status: Never Smoker   • Smokeless tobacco: Never Used   Substance Use Topics   • Alcohol use: Yes     Comment: occassionally/social   • Drug use: Defer     Family History   Problem Relation Age of Onset   • Clotting disorder Biological Mother    • Leukemia Biological Father        ALLERGY:  Patient has no known allergies.    Current Outpatient Medications   Medication Sig Dispense Refill   • apixaban (ELIQUIS) 5 mg tablet Take 5 mg by mouth 2 (two) times a day. To stop 2 days pre-op      • aspirin 81 mg enteric coated tablet Take 1 tablet (81 mg total) by mouth daily. 30 tablet 0   • bumetanide (BUMEX) 0.5 mg tablet Take 0.5 mg by mouth every morning.       • cholecalciferol, vitamin D3, (cholecalciferol) 1,000 unit tablet Take 1,000 Units by mouth daily.       • cyanocobalamin (vitamin B-12) 1,000 mcg tablet Take 1,000 mcg by mouth daily.     • dutasteride (AVODART) 0.5 mg capsule Take 0.5 mg by mouth nightly.    0   • ezetimibe (ZETIA) 10 mg tablet Take 1 tablet (10 mg total) by mouth nightly. 90 tablet 3   • ferrous sulfate 325 mg (65 mg iron) tablet Take 65 mg by mouth every other day.       • ipratropium (ATROVENT) 0.03 % nasal spray Administer 0.03 % into affected nostril(s) as needed. 2 sprays by intranasal route 2-3 times every day in each nostril      • LACTOBACILLUS ACIDOPHILUS (PROBIOTIC ORAL) Take by mouth  daily.     • lisinopriL (PRINIVIL) 10 mg tablet Take 1 tablet (10 mg total) by mouth daily. 90 tablet 3   • metFORMIN (GLUCOPHAGE) 500 mg tablet Take 500 mg by mouth daily with breakfast.       • metoprolol succinate XL (TOPROL-XL) 50 mg 24 hr tablet Take 50 mg by mouth nightly.       • potassium chloride (MICRO-K) 10 mEq CR capsule Take 10 mEq by mouth every other day.       • silodosin (RAPAFLO) 8 mg capsule Take 8 mg by mouth nightly.       • traMADoL (ULTRAM) 50 mg tablet Take 50 mg by mouth daily.       No current facility-administered medications for this visit.       Review of Systems   Constitutional: Negative for malaise/fatigue.   HENT: Negative for hearing loss.    Eyes: Negative for visual disturbance.   Cardiovascular: Negative for chest pain, dyspnea on exertion, irregular heartbeat, leg swelling, near-syncope, orthopnea, palpitations, paroxysmal nocturnal dyspnea and syncope.   Respiratory: Negative for cough and shortness of breath.    Hematologic/Lymphatic: Negative for bleeding problem.   Skin: Negative for rash.   Musculoskeletal: Negative for joint pain and muscle weakness.   Gastrointestinal: Negative for abdominal pain.   Genitourinary: Negative for hematuria.   Neurological: Negative for focal weakness, paresthesias, tremors and weakness.   Psychiatric/Behavioral: Negative for altered mental status.       Objective   Vitals:    09/07/21 1346   BP: 130/72   Resp: 16     BP Readings from Last 3 Encounters:   09/07/21 130/72   09/07/21 122/68   03/04/21 140/60       Physical Exam  Constitutional:       Appearance: He is well-developed.   HENT:      Head: Normocephalic and atraumatic.   Eyes:      Conjunctiva/sclera: Conjunctivae normal.      Pupils: Pupils are equal, round, and reactive to light.   Cardiovascular:      Rate and Rhythm: Normal rate. Rhythm irregular.      Heart sounds: Murmur heard.   Harsh midsystolic murmur is present with a grade of 3/6 at the upper right sternal border  radiating to the neck.     Pulmonary:      Breath sounds: Normal breath sounds. No wheezing or rales.   Abdominal:      General: Bowel sounds are normal.      Palpations: Abdomen is soft. There is no mass.      Tenderness: There is no abdominal tenderness.   Musculoskeletal:         General: No deformity. Normal range of motion.      Cervical back: Normal range of motion.   Skin:     General: Skin is warm and dry.      Findings: No rash.   Neurological:      Mental Status: He is alert and oriented to person, place, and time.      Cranial Nerves: No cranial nerve deficit.   Psychiatric:         Behavior: Behavior normal.         Lab Results   Component Value Date    WBC 9.66 11/28/2019    HGB 12.6 (L) 11/28/2019     11/28/2019    CHOL 183 12/20/2017    TRIG 248 (H) 12/20/2017    HDL 42 (L) 12/20/2017    ALT 24 11/13/2019    AST 20 11/13/2019     11/28/2019    K 4.4 11/28/2019    CREATININE 1.2 11/28/2019    TSH 2.00 07/23/2016    INR 1.3 11/27/2019       Cardiovascular Procedures:    CATH LAB:  Cath - 11/1/2007    ECHO/MUGA:  Echo - 7/15/2010  Echo (Scanned Transthoracic Echo. EF 50%.) - 11/1/2016  Echo (scanned Transthoracic Echocardiography Report EF 45 %) - 12/20/2017.Technically difficult and suboptimal echocardiogram secondary to patient characteristics.Mild concentric left ventricular hypertrophy with normal cavity size and mildly decreased systolic function. Ejection fraction 45%.Unable to evaluate for wall motion abnormalities given difficulty visualizing endocardial borders.Severe aortic stenosis with peak velocity of 4.1 m/s (Peak instantaneous gradient of 66mmHg) and mean gradient of 34 mmHg.Calculated aortic valve area 0.77cm2. Mild aortic insufficiency.Mild mitral regurgitation. Moderately dilated left atrium.Moderately dilated right ventricular size with preserved systolic function. Mild tricuspid regurgitation with estimated right ventricular systolic pressure of 46 mmHg. Compared to the  prior study from 11/2/16, left ventricular function has mildly decreased frrom 50% to 45%. Aortic valve peak gradient has increased from 54mmHg to 66mHg and mean gradient has increased from 24mmHg to 34 mmHg.  Echo 4/24/2019 Critical calcific aortic valve stenosis, aortic valve area 0.66 cm².  · Mildly dilated left ventricular cavity. Moderate concentric left ventricular hypertrophy. Mildly decreased systolic function. Estimated EF = 40- 45%. Diffuse hypokinesis.  · Normal-sized right ventricular cavity with preserved systolic function.  · Mildly dilated right atrium.  · Moderately dilated left atrium.  · Severe mitral valve calcification of the posterior leaflet. Sclerotic mitral valve. Mild mitral valve regurgitation.  · Mild tricuspid valve regurgitation with RVSP of 63 mmHg.  · Aortic root calcificied. The sinuses of Valsalva calcified. Effacement of the sinotubular junction.  · IVC not well visualized.  Normal pericardium. No evidence of pericardial effusion.    Echocardiogram 9/7/2021  1.  Technically adequate study.  2.  The rhythm appears to be atrial fibrillation with electronic ventricular pacing.  3.  Mildly dilated left ventricle with overall mildly diminished left ventricular systolic function.  Ejection fraction estimated at 40 to 45%.  Abnormal wall motion secondary to a paced rhythm.  4.  Status post transcatheter heart valve with a #29 Medtronic device in the aortic position.  No significant aortic stenosis.  Mild paravalvular aortic insufficiency.  Estimated aortic valve area 1.7 to 1.8 cm².  5.  Moderately dilated left atrium with mild mitral annular calcification and trace to mild mitral regurgitation.  6.  Trace pulmonic insufficiency.  7.  Normal left ventricular cavity size with normal wall thickness and preserved systolic function.  Pacemaker leads are present in the right ventricle and right atrium.  8.  Trace to mild tricuspid regurgitation.  9.  Compared to a study of December 17, 2019, no  significant change.    ELECTROPHYSIOLOGY:  Devices (Single Chamber PPM (Biotronik-Evia SRT), serial # [id number], ventricular lead(9/16/13)biotronik S60) -9/16/2013    STRESS TESTS:  SEH - 5/20/2009    VASCULAR:  Carotid Duplex (scanned Vascular Carotid) - 12/22/2014    CT/MRI:  Abd/Pelvis CT - 2/7/2012    OTHER:  CXR - 12/3/2011  Other (ABDOMINAL AORTIC US) - 1/20/2011  Other (CAROTID US) - 1/20/2011    TAVR 11/27/2019  s/p successful TF TAVR with 29 mm Evolut Pro    ECG Electronic ventricular pacemaker   Pacemaker ECG,    Assessment   Problem List Items Addressed This Visit        Circulatory    Atrial fibrillation (CMS/HCC) (Chronic)     Chronic and asymptomatic.  He is anticoagulated on Eliquis 5 mg twice daily with no evidence of thromboembolic or hemorrhagic events.         Heart block (Chronic)     Chronic atrial fibrillation with heart block.          S/P TAVR (transcatheter aortic valve replacement) - Primary (Chronic)     Status post transcatheter heart valve with a #29 Medtronic device in the aortic position.  No significant aortic stenosis.  Mild paravalvular aortic insufficiency.  Estimated aortic valve area 1.7 to 1.8 cm².            Other    Pacemaker     Biotronik Evia single-chamber pacemaker implanted September 16, 2013.  Remaining battery longevity is 2 yr/3 mos.   Parameter summary is VVI-CLS 60 bpm.  R wave is 21.3 mV, threshold 1.9 V at 0.4 ms and lead impedance 526 ohms.  97% ventricular pacing.  We will continue to monitor him closely.                    Stevo Meyer MD  9/19/2021

## 2021-12-13 ENCOUNTER — TRANSCRIBE ORDERS (OUTPATIENT)
Dept: SCHEDULING | Age: 86
End: 2021-12-13
Payer: MEDICARE

## 2021-12-13 DIAGNOSIS — M48.062 SPINAL STENOSIS, LUMBAR REGION WITH NEUROGENIC CLAUDICATION: ICD-10-CM

## 2021-12-13 DIAGNOSIS — M79.604 PAIN IN RIGHT LEG: Primary | ICD-10-CM

## 2021-12-13 DIAGNOSIS — M79.605 PAIN IN LEFT LEG: ICD-10-CM

## 2021-12-15 ENCOUNTER — HOSPITAL ENCOUNTER (OUTPATIENT)
Dept: CARDIOLOGY | Facility: HOSPITAL | Age: 86
Discharge: HOME | End: 2021-12-15
Attending: NEUROLOGICAL SURGERY
Payer: MEDICARE

## 2021-12-15 DIAGNOSIS — M48.062 SPINAL STENOSIS, LUMBAR REGION WITH NEUROGENIC CLAUDICATION: ICD-10-CM

## 2021-12-15 LAB
LT CFA DIST PSV: 88.32 CM/S
LT DORSALIS PEDIS PROX PSV: 126.6 CM/S
LT PERONEAL PROX PSV: 37.63 CM/S
LT POPLITEAL MID PSV: 101.02 CM/S
LT POST TIBIAL MID PSV: 122.7 CM/S
LT PROFUNDA PROX PSV: 54.68 CM/S
LT PROFUNDA PROX RATIO: 0.62
LT SFA DIST PSV: 85.25 CM/S
LT SFA MID PSV: 110.87 CM/S
LT SFA PROX PSV: 106.93 CM/S
LT SFA PROX RATIO: 1.21
RT CFA DIST PSV: 110.26 CM/S
RT DORSALIS PEDIS PROX PSV: 35.2 CM/S
RT PERONEAL PROX PSV: 30.28 CM/S
RT POPLITEAL MID PSV: 61.16 CM/S
RT POST TIBIAL PROX PSV: 110.26 CM/S
RT PROFUNDA PROX PSV: 57.28 CM/S
RT PROFUNDA PROX RATIO: 0.52
RT SFA DIST PSV: 128.61 CM/S
RT SFA MID PSV: 84.44 CM/S
RT SFA PROX PSV: 89.62 CM/S
RT SFA PROX RATIO: 0.81

## 2021-12-15 PROCEDURE — 93925 LOWER EXTREMITY STUDY: CPT

## 2022-01-12 ENCOUNTER — HOSPITAL ENCOUNTER (OUTPATIENT)
Dept: CARDIOLOGY | Facility: HOSPITAL | Age: 87
Discharge: HOME | End: 2022-01-12
Attending: NEUROLOGICAL SURGERY
Payer: MEDICARE

## 2022-01-12 DIAGNOSIS — M79.605 PAIN IN LEFT LEG: ICD-10-CM

## 2022-01-12 DIAGNOSIS — M79.604 PAIN IN RIGHT LEG: ICD-10-CM

## 2022-01-12 LAB
LEFT ABI: 1.42
LEFT ARM BP: 180 MMHG
LEFT DORSALIS PEDIS INDEX: 1.42
LEFT DORSALIS PEDIS: 255 MMHG
LEFT LOW THIGH INDEX: 1.42
LEFT LOW THIGH: 255 MMHG
LEFT POST TIBIAL INDEX: 1.42
LEFT POSTERIOR TIBIAL: 255 MMHG
LEFT PROXIMAL CALF INDEX: 1.42
LEFT PROXIMAL CALF: 255 MMHG
RIGHT ABI: 1.12
RIGHT ARM BP: 178 MMHG
RIGHT DORSALIS PEDIS INDEX: 0.94
RIGHT DORSALIS PEDIS: 169 MMHG
RIGHT LOW THIGH INDEX: 1.42
RIGHT LOW THIGH: 255 MMHG
RIGHT POST TIBIAL INDEX: 1.12
RIGHT POSTERIOR TIBIAL: 201 MMHG
RIGHT PROXIMAL CALF INDEX: 1.42
RIGHT PROXIMAL CALF: 255 MMHG

## 2022-01-12 PROCEDURE — 93923 UPR/LXTR ART STDY 3+ LVLS: CPT

## 2022-03-07 PROBLEM — E11.9 DIABETES MELLITUS (CMS/HCC): Status: RESOLVED | Noted: 2019-08-12 | Resolved: 2022-03-07

## 2022-03-08 ENCOUNTER — OFFICE VISIT (OUTPATIENT)
Dept: CARDIOLOGY | Facility: CLINIC | Age: 87
End: 2022-03-08
Payer: MEDICARE

## 2022-03-08 VITALS
OXYGEN SATURATION: 97 % | RESPIRATION RATE: 18 BRPM | DIASTOLIC BLOOD PRESSURE: 58 MMHG | BODY MASS INDEX: 28.66 KG/M2 | SYSTOLIC BLOOD PRESSURE: 112 MMHG | HEIGHT: 74 IN | WEIGHT: 223.3 LBS | HEART RATE: 74 BPM

## 2022-03-08 VITALS
DIASTOLIC BLOOD PRESSURE: 64 MMHG | HEIGHT: 74 IN | WEIGHT: 222 LBS | BODY MASS INDEX: 28.49 KG/M2 | RESPIRATION RATE: 15 BRPM | SYSTOLIC BLOOD PRESSURE: 122 MMHG

## 2022-03-08 DIAGNOSIS — Z95.2 S/P TAVR (TRANSCATHETER AORTIC VALVE REPLACEMENT): Primary | Chronic | ICD-10-CM

## 2022-03-08 DIAGNOSIS — Z95.0 PACEMAKER: ICD-10-CM

## 2022-03-08 DIAGNOSIS — Z95.1 S/P CABG (CORONARY ARTERY BYPASS GRAFT): Chronic | ICD-10-CM

## 2022-03-08 DIAGNOSIS — I48.20 CHRONIC ATRIAL FIBRILLATION (CMS/HCC): Primary | Chronic | ICD-10-CM

## 2022-03-08 DIAGNOSIS — E11.9 TYPE 2 DIABETES MELLITUS WITHOUT COMPLICATION, WITHOUT LONG-TERM CURRENT USE OF INSULIN (CMS/HCC): ICD-10-CM

## 2022-03-08 DIAGNOSIS — I48.91 ATRIAL FIBRILLATION, UNSPECIFIED TYPE (CMS/HCC): Chronic | ICD-10-CM

## 2022-03-08 DIAGNOSIS — Z95.2 S/P TAVR (TRANSCATHETER AORTIC VALVE REPLACEMENT): Chronic | ICD-10-CM

## 2022-03-08 DIAGNOSIS — I45.9 HEART BLOCK: Chronic | ICD-10-CM

## 2022-03-08 DIAGNOSIS — I73.9 PAD (PERIPHERAL ARTERY DISEASE) (CMS/HCC): ICD-10-CM

## 2022-03-08 DIAGNOSIS — I25.10 CORONARY ARTERY DISEASE INVOLVING NATIVE CORONARY ARTERY OF NATIVE HEART WITHOUT ANGINA PECTORIS: ICD-10-CM

## 2022-03-08 PROCEDURE — 99214 OFFICE O/P EST MOD 30 MIN: CPT | Performed by: INTERNAL MEDICINE

## 2022-03-08 PROCEDURE — 93000 ELECTROCARDIOGRAM COMPLETE: CPT | Performed by: INTERNAL MEDICINE

## 2022-03-08 ASSESSMENT — ENCOUNTER SYMPTOMS
WEIGHT LOSS: 0
COUGH: 0
ENDOCRINE NEGATIVE: 1
FEVER: 0
DECREASED APPETITE: 0
SLEEP DISTURBANCES DUE TO BREATHING: 0
HEARTBURN: 0
FLANK PAIN: 0
WEAKNESS: 0
DIZZINESS: 0
WEIGHT GAIN: 0
LIGHT-HEADEDNESS: 0
BLOATING: 0
CHANGE IN BOWEL HABIT: 0
ALLERGIC/IMMUNOLOGIC NEGATIVE: 1
BRUISES/BLEEDS EASILY: 0
PSYCHIATRIC NEGATIVE: 1
BACK PAIN: 1
HEMATURIA: 0
BLURRED VISION: 0

## 2022-03-08 NOTE — LETTER
March 8, 2022     Aguilar Phan MD  [address] [facility] [address]    Patient: Cain Pruitt  YOB: 1932  Date of Visit: 3/8/2022      Dear Dr. Phan:    Thank you for referring Cain Pruitt to me for evaluation. Below are my notes for this consultation.    If you have questions, please do not hesitate to call me. I look forward to following your patient along with you.         Sincerely,        Efe Mejia MD        CC: MD Bryant Andre MD William C Welch, MD Coady, Paul M, MD  3/8/2022 12:46 PM  Signed     Cardiology Note       Reason for visit: Scheduled cardiology follow-up    Chief Complaint   Patient presents with   • S/P TAVR (transcatheter aortic valve replacement)   Permanent pacemaker  Obstructive sleep apnea  Systemic hypertension     HPI   Cain Pruitt is a 89 y.o. male who presents for cardiac follow-up.  He voices no cardiovascular complaints and states that his breathing is comfortable, his level of activity acceptable, limited only by the problems with his low back.  No symptoms of PND.  No orthopnea.  No ankle edema.  No symptoms of angina.    He did have an attempted myelogram done yesterday which unfortunately was unsuccessful.  That study will be repeated.  There is a possible anticipation of laminectomy based on the outcome of that evaluation.    He denies bleeding on Eliquis.  He has a good appetite and sleeps well.  He does not use a CPAP.  His bowels are functioning normally.      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    • Colon polyps    • Coronary artery disease 1991    s/p cabg    • Decreased cardiac ejection fraction 1/24/2019   • Edema    • Erectile dysfunction  "   • GI (gastrointestinal bleed)    • GI (gastrointestinal bleed) 06/2016   • Heart block 1/24/2019   • History of percutaneous coronary intervention 8/12/2019    BMS to SVG to OM 2007   • History of transfusion 2011    during colon resection had \"nicked artery\"- required multiple unitos pf blood    • Hyperlipidemia    • Hypertension    • Hypotension    • Lumbar herniated disc    • SIENA (obstructive sleep apnea) 8/12/2019   • Osteomyelitis of toe of right foot (CMS/HCC) 2016    s/p 2nd and 3rd toe osteomylitis    • Pacemaker 1/24/2019   • PAD (peripheral artery disease) (CMS/[facility]) 8/12/2019   • Peripheral neuropathy     tips of fingers  and feet    • S/P CABG (coronary artery bypass graft) 1991 1991 LIMA to LAD, ARTURO to RDA, SVG to OM   • 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    Transfemoral TAVR #29 Evolut Pro on 11-   • Sleep apnea    • Syncope     2008,2011   • Type 2 diabetes mellitus (CMS/HCC)     last HgBA1C was 7.2 in 7/2019 managed by PCP Dr Aguilar Phan   • Urinary retention     self cath's 3 times per day,    • Vitamin D deficiency      Past Surgical History:   Procedure Laterality Date   • AMPUTATION FOOT / TOE Right 2016    2nd and 3rd right foot from osteomylitis    • BOWEL RESECTION  2011   • CARDIAC PACEMAKER PLACEMENT  2013   • CARDIAC SURGERY     • CARDIOVERSION  2008   • CORONARY ARTERY BYPASS GRAFT  1991    x 4 bypasses    • CORONARY STENT PLACEMENT      3 stents   • KNEE SURGERY Right 1951   • MOHS SURGERY     • SKIN BIOPSY       Social History     Socioeconomic History   • Marital status:      Spouse name: None   • Number of children: None   • Years of education: None   • Highest education level: None   Occupational History   • None   Tobacco Use   • Smoking status: Never Smoker   • Smokeless tobacco: Never Used   Vaping Use   • Vaping Use: Never used   Substance and Sexual Activity   • Alcohol use: Yes     Comment: occassionally/social   • Drug use: Defer "   • Sexual activity: Defer   Other Topics Concern   • None   Social History Narrative   • None     Social Determinants of Health     Financial Resource Strain: Not on file   Food Insecurity: Not on file   Transportation Needs: Not on file   Physical Activity: Not on file   Stress: Not on file   Social Connections: Not on file   Intimate Partner Violence: Not on file   Housing Stability: Not on file     Family History   Problem Relation Age of Onset   • Clotting disorder Biological Mother    • Leukemia Biological Father      Patient has no known allergies.  Current Outpatient Medications   Medication Sig Dispense Refill   • apixaban (ELIQUIS) 5 mg tablet Take 5 mg by mouth 2 (two) times a day. To stop 2 days pre-op      • aspirin 81 mg enteric coated tablet Take 1 tablet (81 mg total) by mouth daily. 30 tablet 0   • bumetanide (BUMEX) 0.5 mg tablet Take 0.5 mg by mouth every morning.       • cholecalciferol, vitamin D3, (cholecalciferol) 1,000 unit tablet Take 1,000 Units by mouth daily.       • cyanocobalamin (vitamin B-12) 1,000 mcg tablet Take 1,000 mcg by mouth daily.     • dutasteride (AVODART) 0.5 mg capsule Take 0.5 mg by mouth nightly.    0   • ezetimibe 10 mg tablet take 1 tablet by mouth NIGHTLY 90 tablet 3   • ferrous sulfate 325 mg (65 mg iron) tablet Take 65 mg by mouth every other day.       • ipratropium (ATROVENT) 0.03 % nasal spray Administer 0.03 % into affected nostril(s) as needed. 2 sprays by intranasal route 2-3 times every day in each nostril      • LACTOBACILLUS ACIDOPHILUS (PROBIOTIC ORAL) Take by mouth daily.     • lisinopriL (PRINIVIL) 10 mg tablet Take 1 tablet (10 mg total) by mouth daily. 90 tablet 3   • metFORMIN (GLUCOPHAGE) 500 mg tablet Take 500 mg by mouth daily with breakfast.       • metoprolol succinate XL (TOPROL-XL) 50 mg 24 hr tablet Take 50 mg by mouth nightly.       • potassium chloride (MICRO-K) 10 mEq CR capsule Take 10 mEq by mouth every other day.       • silodosin  (RAPAFLO) 8 mg capsule Take 8 mg by mouth nightly.       • traMADoL (ULTRAM) 50 mg tablet Take 50 mg by mouth daily.       No current facility-administered medications for this visit.       Review of Systems   Constitutional: Negative for decreased appetite, fever, weight gain and weight loss.   HENT: Negative for congestion and nosebleeds.    Eyes: Negative for blurred vision and visual disturbance.   Cardiovascular: Negative for chest pain and leg swelling.   Respiratory: Negative for cough and sleep disturbances due to breathing.    Endocrine: Negative.    Hematologic/Lymphatic: Negative for bleeding problem. Does not bruise/bleed easily.   Musculoskeletal: Positive for arthritis, back pain and joint pain.        Herniated disc at L4 and L5   Gastrointestinal: Negative for bloating, change in bowel habit and heartburn.   Genitourinary: Negative for flank pain and hematuria.   Neurological: Negative for dizziness, light-headedness and weakness.   Psychiatric/Behavioral: Negative.    Allergic/Immunologic: Negative.      Objective      Weight 223.3 pounds  Blood pressure 136/68 seated position left arm.  Resting room air saturation 98% with a respiratory rate of 16/min.  Pulse 70 bpm and regular.  Single extrasystole felt.    Physical Exam:    General Appearance:  Alert, no distress   Head:  Normocephalic, without obvious abnormality, atraumatic   Eyes:  Conjunctiva/corneas clear, EOM's intact   Neck: No thyroid enlargement. No JVD. No bruits    Lungs:   Clear to auscultation bilaterally, respirations unlabored, no rales, no wheezing   Heart:  Regular rhythm, S1 and S2 normal,  Systolic ejection murmur, rub or gallop.  Single extrasystole heard.   Abdomen:   Soft, non-tender, no masses, no organomegaly   Vascular: Pulses 2+ and symmetric all extremities, no carotid bruit or jugular vein distention   Musculoskeletal:  Skin: No injury or deformity  Skin color, texture, turgor normal, no rashes or lesions, no cyanosis  or edema   Extremities: Extremities normal, atraumatic, pulses normal   Behavior/Emotional:  Neurologic: Appropriate, cooperative  No gross neurologic abnormalities.       Lab Results   Component Value Date    WBC 9.66 11/28/2019    HGB 12.6 (L) 11/28/2019     11/28/2019    CHOL 183 12/20/2017    TRIG 248 (H) 12/20/2017    HDL 42 (L) 12/20/2017    ALT 24 11/13/2019    AST 20 11/13/2019     11/28/2019    K 4.4 11/28/2019    CREATININE 1.2 11/28/2019    TSH 2.00 07/23/2016    INR 1.3 11/27/2019      Echocardiogram September 7, 2021  Summary:     1.  Technically adequate study.  2.  The rhythm appears to be atrial fibrillation with electronic ventricular pacing.  3.  Mildly dilated left ventricle with overall mildly diminished left ventricular systolic function.  Ejection fraction estimated at 40 to 45%.  Abnormal wall motion secondary to a paced rhythm.  4.  Status post transcatheter heart valve with a #29 Medtronic device in the aortic position.  No significant aortic stenosis.  Mild paravalvular aortic insufficiency.  Estimated aortic valve area 1.7 to 1.8 cm².  5.  Moderately dilated left atrium with mild mitral annular calcification and trace to mild mitral regurgitation.  6.  Trace pulmonic insufficiency.  7.  Normal left ventricular cavity size with normal wall thickness and preserved systolic function.  Pacemaker leads are present in the right ventricle and right atrium.  8.  Trace to mild tricuspid regurgitation.  9.  Compared to a study of December 17, 2019, no significant change.     ECG        ASSESSMENT/PLAN    SIENA (obstructive sleep apnea)  He does not use CPAP.     Atrial fibrillation (CMS/HCC)/Heart block/status post permanent pacemaker.  He remains on Eliquis and follows up with Dr. Meyer.  He has no bleeding and has not fallen.  He has scheduled pacemaker follow-up today.    S/P TAVR (transcatheter aortic valve replacement)  Transfemoral TAVR #29 Evolut Pro on 11-.  Clinically he  continues to do well.  No specific complaints to suggest congestive heart failure.  The most recent echo demonstrated appropriate position and function of the prosthetic device.  His exam remained stable.    Coronary artery disease involving native coronary artery without angina pectoris/S/P CABG (coronary artery bypass graft)/CHF (congestive heart failure), NYHA class III, acute on chronic, combined (CMS/[facility])  1991 LIMA to LAD, ARTURO to RDA, SVG to OM  He has no obvious angina or heart failure.  He will remain on his current medications.  His ejection fraction has not declined.      Hypertension  Under fair control today on his current medications.  They will remain unchanged.  Sodium limitation in his diet was emphasized.     Type 2 diabetes mellitus (CMS/[facility])  He is on oral therapy and follows up with his primary care physician regarding his diabetes.     Hyperlipidemia  He is on lipid-lowering therapy which he tolerates.  Laboratory studies were drawn on February 25, 2021.  Total cholesterol 170, triglycerides 196, HDL 40 with an LDL of 91.    Preprocedure evaluation  He has acceptable cardiovascular risk to proceed with laminectomy if that is recommended.  Management of his anticoagulation will be under the direction of the Electrophysiologist.    In summary then, he continues to do well.  No new medications advised.  No restrictions placed on his activity from a cardiovascular perspective.  Follow-up will be in 6 months.     Efe Mejia MD  3/8/2022

## 2022-03-08 NOTE — PROGRESS NOTES
"   Cardiology Note       Reason for visit: Scheduled cardiology follow-up    Chief Complaint   Patient presents with   • S/P TAVR (transcatheter aortic valve replacement)   Permanent pacemaker  Obstructive sleep apnea  Systemic hypertension     HPI   Cain Pruitt is a 89 y.o. male who presents for cardiac follow-up.  He voices no cardiovascular complaints and states that his breathing is comfortable, his level of activity acceptable, limited only by the problems with his low back.  No symptoms of PND.  No orthopnea.  No ankle edema.  No symptoms of angina.    He did have an attempted myelogram done yesterday which unfortunately was unsuccessful.  That study will be repeated.  There is a possible anticipation of laminectomy based on the outcome of that evaluation.    He denies bleeding on Eliquis.  He has a good appetite and sleeps well.  He does not use a CPAP.  His bowels are functioning normally.      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    • Colon polyps    • Coronary artery disease 1991    s/p cabg    • Decreased cardiac ejection fraction 1/24/2019   • Edema    • Erectile dysfunction    • GI (gastrointestinal bleed)    • GI (gastrointestinal bleed) 06/2016   • Heart block 1/24/2019   • History of percutaneous coronary intervention 8/12/2019    BMS to SVG to OM 2007   • History of transfusion 2011    during colon resection had \"nicked artery\"- required multiple unitos pf blood    • Hyperlipidemia    • Hypertension    • Hypotension    • Lumbar herniated disc    • SIENA (obstructive sleep apnea) 8/12/2019   • Osteomyelitis of toe of right foot (CMS/HCC) 2016    s/p 2nd and 3rd toe osteomylitis    • Pacemaker 1/24/2019   • PAD (peripheral artery " disease) (CMS/[facility]) 8/12/2019   • Peripheral neuropathy     tips of fingers  and feet    • S/P CABG (coronary artery bypass graft) 1991 1991 LIMA to LAD, ARTURO to RDA, SVG to OM   • 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    Transfemoral TAVR #29 Evolut Pro on 11-   • Sleep apnea    • Syncope     2008,2011   • Type 2 diabetes mellitus (CMS/HCC)     last HgBA1C was 7.2 in 7/2019 managed by PCP Dr Aguilar Phan   • Urinary retention     self cath's 3 times per day,    • Vitamin D deficiency      Past Surgical History:   Procedure Laterality Date   • AMPUTATION FOOT / TOE Right 2016    2nd and 3rd right foot from osteomylitis    • BOWEL RESECTION  2011   • CARDIAC PACEMAKER PLACEMENT  2013   • CARDIAC SURGERY     • CARDIOVERSION  2008   • CORONARY ARTERY BYPASS GRAFT  1991    x 4 bypasses    • CORONARY STENT PLACEMENT      3 stents   • KNEE SURGERY Right 1951   • MOHS SURGERY     • SKIN BIOPSY       Social History     Socioeconomic History   • Marital status:      Spouse name: None   • Number of children: None   • Years of education: None   • Highest education level: None   Occupational History   • None   Tobacco Use   • Smoking status: Never Smoker   • Smokeless tobacco: Never Used   Vaping Use   • Vaping Use: Never used   Substance and Sexual Activity   • Alcohol use: Yes     Comment: occassionally/social   • Drug use: Defer   • Sexual activity: Defer   Other Topics Concern   • None   Social History Narrative   • None     Social Determinants of Health     Financial Resource Strain: Not on file   Food Insecurity: Not on file   Transportation Needs: Not on file   Physical Activity: Not on file   Stress: Not on file   Social Connections: Not on file   Intimate Partner Violence: Not on file   Housing Stability: Not on file     Family History   Problem Relation Age of Onset   • Clotting disorder Biological Mother    • Leukemia Biological Father      Patient has no known  allergies.  Current Outpatient Medications   Medication Sig Dispense Refill   • apixaban (ELIQUIS) 5 mg tablet Take 5 mg by mouth 2 (two) times a day. To stop 2 days pre-op      • aspirin 81 mg enteric coated tablet Take 1 tablet (81 mg total) by mouth daily. 30 tablet 0   • bumetanide (BUMEX) 0.5 mg tablet Take 0.5 mg by mouth every morning.       • cholecalciferol, vitamin D3, (cholecalciferol) 1,000 unit tablet Take 1,000 Units by mouth daily.       • cyanocobalamin (vitamin B-12) 1,000 mcg tablet Take 1,000 mcg by mouth daily.     • dutasteride (AVODART) 0.5 mg capsule Take 0.5 mg by mouth nightly.    0   • ezetimibe 10 mg tablet take 1 tablet by mouth NIGHTLY 90 tablet 3   • ferrous sulfate 325 mg (65 mg iron) tablet Take 65 mg by mouth every other day.       • ipratropium (ATROVENT) 0.03 % nasal spray Administer 0.03 % into affected nostril(s) as needed. 2 sprays by intranasal route 2-3 times every day in each nostril      • LACTOBACILLUS ACIDOPHILUS (PROBIOTIC ORAL) Take by mouth daily.     • lisinopriL (PRINIVIL) 10 mg tablet Take 1 tablet (10 mg total) by mouth daily. 90 tablet 3   • metFORMIN (GLUCOPHAGE) 500 mg tablet Take 500 mg by mouth daily with breakfast.       • metoprolol succinate XL (TOPROL-XL) 50 mg 24 hr tablet Take 50 mg by mouth nightly.       • potassium chloride (MICRO-K) 10 mEq CR capsule Take 10 mEq by mouth every other day.       • silodosin (RAPAFLO) 8 mg capsule Take 8 mg by mouth nightly.       • traMADoL (ULTRAM) 50 mg tablet Take 50 mg by mouth daily.       No current facility-administered medications for this visit.       Review of Systems   Constitutional: Negative for decreased appetite, fever, weight gain and weight loss.   HENT: Negative for congestion and nosebleeds.    Eyes: Negative for blurred vision and visual disturbance.   Cardiovascular: Negative for chest pain and leg swelling.   Respiratory: Negative for cough and sleep disturbances due to breathing.    Endocrine:  Negative.    Hematologic/Lymphatic: Negative for bleeding problem. Does not bruise/bleed easily.   Musculoskeletal: Positive for arthritis, back pain and joint pain.        Herniated disc at L4 and L5   Gastrointestinal: Negative for bloating, change in bowel habit and heartburn.   Genitourinary: Negative for flank pain and hematuria.   Neurological: Negative for dizziness, light-headedness and weakness.   Psychiatric/Behavioral: Negative.    Allergic/Immunologic: Negative.      Objective      Weight 223.3 pounds  Blood pressure 136/68 seated position left arm.  Resting room air saturation 98% with a respiratory rate of 16/min.  Pulse 70 bpm and regular.  Single extrasystole felt.    Physical Exam:    General Appearance:  Alert, no distress   Head:  Normocephalic, without obvious abnormality, atraumatic   Eyes:  Conjunctiva/corneas clear, EOM's intact   Neck: No thyroid enlargement. No JVD. No bruits    Lungs:   Clear to auscultation bilaterally, respirations unlabored, no rales, no wheezing   Heart:  Regular rhythm, S1 and S2 normal,  Systolic ejection murmur, rub or gallop.  Single extrasystole heard.   Abdomen:   Soft, non-tender, no masses, no organomegaly   Vascular: Pulses 2+ and symmetric all extremities, no carotid bruit or jugular vein distention   Musculoskeletal:  Skin: No injury or deformity  Skin color, texture, turgor normal, no rashes or lesions, no cyanosis or edema   Extremities: Extremities normal, atraumatic, pulses normal   Behavior/Emotional:  Neurologic: Appropriate, cooperative  No gross neurologic abnormalities.       Lab Results   Component Value Date    WBC 9.66 11/28/2019    HGB 12.6 (L) 11/28/2019     11/28/2019    CHOL 183 12/20/2017    TRIG 248 (H) 12/20/2017    HDL 42 (L) 12/20/2017    ALT 24 11/13/2019    AST 20 11/13/2019     11/28/2019    K 4.4 11/28/2019    CREATININE 1.2 11/28/2019    TSH 2.00 07/23/2016    INR 1.3 11/27/2019      Echocardiogram September 7,  2021  Summary:     1.  Technically adequate study.  2.  The rhythm appears to be atrial fibrillation with electronic ventricular pacing.  3.  Mildly dilated left ventricle with overall mildly diminished left ventricular systolic function.  Ejection fraction estimated at 40 to 45%.  Abnormal wall motion secondary to a paced rhythm.  4.  Status post transcatheter heart valve with a #29 Medtronic device in the aortic position.  No significant aortic stenosis.  Mild paravalvular aortic insufficiency.  Estimated aortic valve area 1.7 to 1.8 cm².  5.  Moderately dilated left atrium with mild mitral annular calcification and trace to mild mitral regurgitation.  6.  Trace pulmonic insufficiency.  7.  Normal left ventricular cavity size with normal wall thickness and preserved systolic function.  Pacemaker leads are present in the right ventricle and right atrium.  8.  Trace to mild tricuspid regurgitation.  9.  Compared to a study of December 17, 2019, no significant change.     ECG        ASSESSMENT/PLAN    SIENA (obstructive sleep apnea)  He does not use CPAP.     Atrial fibrillation (CMS/[facility])/Heart block/status post permanent pacemaker.  He remains on Eliquis and follows up with Dr. Meyer.  He has no bleeding and has not fallen.  He has scheduled pacemaker follow-up today.    S/P TAVR (transcatheter aortic valve replacement)  Transfemoral TAVR #29 Evolut Pro on 11-.  Clinically he continues to do well.  No specific complaints to suggest congestive heart failure.  The most recent echo demonstrated appropriate position and function of the prosthetic device.  His exam remained stable.    Coronary artery disease involving native coronary artery without angina pectoris/S/P CABG (coronary artery bypass graft)/CHF (congestive heart failure), NYHA class III, acute on chronic, combined (CMS/HCC)  1991 LIMA to LAD, ARTURO to RDA, SVG to OM  He has no obvious angina or heart failure.  He will remain on his current medications.   His ejection fraction has not declined.      Hypertension  Under fair control today on his current medications.  They will remain unchanged.  Sodium limitation in his diet was emphasized.     Type 2 diabetes mellitus (CMS/[facility])  He is on oral therapy and follows up with his primary care physician regarding his diabetes.     Hyperlipidemia  He is on lipid-lowering therapy which he tolerates.  Laboratory studies were drawn on February 25, 2021.  Total cholesterol 170, triglycerides 196, HDL 40 with an LDL of 91.    Preprocedure evaluation  He has acceptable cardiovascular risk to proceed with laminectomy if that is recommended.  Management of his anticoagulation will be under the direction of the Electrophysiologist.    In summary then, he continues to do well.  No new medications advised.  No restrictions placed on his activity from a cardiovascular perspective.  Follow-up will be in 6 months.     Efe Mejia MD  3/8/2022

## 2022-03-09 ASSESSMENT — ENCOUNTER SYMPTOMS
DYSPNEA ON EXERTION: 0
COUGH: 0
SYNCOPE: 0
ABDOMINAL PAIN: 0
SHORTNESS OF BREATH: 0
PALPITATIONS: 0
FOCAL WEAKNESS: 0
IRREGULAR HEARTBEAT: 0
TREMORS: 0
HEMATURIA: 0
PND: 0
ALTERED MENTAL STATUS: 0
WEAKNESS: 0
PARESTHESIAS: 0
ORTHOPNEA: 0
NEAR-SYNCOPE: 0

## 2022-03-09 NOTE — ASSESSMENT & PLAN NOTE
Status post transcatheter heart valve with a #29 Medtronic device in the aortic position.  Followed closely by Dr. Mejia

## 2022-03-09 NOTE — ASSESSMENT & PLAN NOTE
He has a history of coronary heart disease status post percutaneous coronary intervention and bypass graft surgery.  Followed by Dr. Mejia.  No complaints of anginal type symptoms.

## 2022-03-09 NOTE — ASSESSMENT & PLAN NOTE
Truliooronik Evia single-chamber pacemaker implanted September 16, 2013.  Remaining battery longevity is 1 year 6 months   Parameter summary is VVI-CLS 60 bpm.  R wave is 21.3 mV, threshold 0.8 V at 0.4 ms and lead impedance 585 ohms.  90% ventricular pacing.  We will continue to monitor him closely.    If he does undergo laminectomy as anticipated, a magnet should be secured over his device due to his heart block.  If positioning does not allow for a magnet to remain secured then reprogramming the device to VOO at 60 bpm should be completed

## 2022-03-09 NOTE — PROGRESS NOTES
"     Electrophysiology  Outpatient Progress Note       Reason for visit:   Chief Complaint   Patient presents with   • Device Check       HPI   Cain Pruitt is a 89 y.o. male who is seen today in this office for follow up of chronic permanent atrial fibrillation and sick sinus syndrome.  He was last seen in this office July 2019.  He has a permanent pacemaker.  His main complaints now are focused on his lower back pain.    He has a history of coronary heart disease status post percutaneous coronary intervention and bypass graft surgery.         Past Medical History:   Diagnosis Date   • A-fib (CMS/[facility])    • Arthritis     b/l knees   • BPH (benign prostatic hyperplasia)    • CAD (coronary artery disease)     4 stents placed since 1991   • Cancer of cecum (CMS/[facility]) 2011    S/p resection 2011   • Cecum cancer (CMS/[facility])     12/2/2011 no chemo or XRT.    • Colon polyps    • Coronary artery disease 1991    s/p cabg    • Decreased cardiac ejection fraction 1/24/2019   • Edema    • Erectile dysfunction    • GI (gastrointestinal bleed)    • GI (gastrointestinal bleed) 06/2016   • Heart block 1/24/2019   • History of percutaneous coronary intervention 8/12/2019    BMS to SVG to OM 2007   • History of transfusion 2011    during colon resection had \"nicked artery\"- required multiple unitos pf blood    • Hyperlipidemia    • Hypertension    • Hypotension    • Lumbar herniated disc    • SIENA (obstructive sleep apnea) 8/12/2019   • Osteomyelitis of toe of right foot (CMS/[facility]) 2016    s/p 2nd and 3rd toe osteomylitis    • Pacemaker 1/24/2019   • PAD (peripheral artery disease) (CMS/[facility]) 8/12/2019   • Peripheral neuropathy     tips of fingers  and feet    • S/P CABG (coronary artery bypass graft) 1991 1991 LIMA to LAD, ARTURO to RDA, SVG to OM   • 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    Transfemoral TAVR #29 Evolut Pro on 11-   • Sleep apnea    • Syncope     2008,2011   • Type 2 diabetes mellitus " (CMS/[facility])     last HgBA1C was 7.2 in 7/2019 managed by PCP Dr Aguilar Phan   • Urinary retention     self cath's 3 times per day,    • Vitamin D deficiency      Past Surgical History:   Procedure Laterality Date   • AMPUTATION FOOT / TOE Right 2016    2nd and 3rd right foot from osteomylitis    • BOWEL RESECTION  2011   • CARDIAC PACEMAKER PLACEMENT  2013   • CARDIAC SURGERY     • CARDIOVERSION  2008   • CORONARY ARTERY BYPASS GRAFT  1991    x 4 bypasses    • CORONARY STENT PLACEMENT      3 stents   • KNEE SURGERY Right 1951   • MOHS SURGERY     • SKIN BIOPSY         Social History     Tobacco Use   • Smoking status: Never Smoker   • Smokeless tobacco: Never Used   Vaping Use   • Vaping Use: Never used   Substance Use Topics   • Alcohol use: Yes     Comment: occassionally/social   • Drug use: Defer     Family History   Problem Relation Age of Onset   • Clotting disorder Biological Mother    • Leukemia Biological Father        ALLERGY:  Patient has no known allergies.    Current Outpatient Medications   Medication Sig Dispense Refill   • apixaban (ELIQUIS) 5 mg tablet Take 5 mg by mouth 2 (two) times a day. To stop 2 days pre-op      • aspirin 81 mg enteric coated tablet Take 1 tablet (81 mg total) by mouth daily. 30 tablet 0   • bumetanide (BUMEX) 0.5 mg tablet Take 0.5 mg by mouth every morning.       • cholecalciferol, vitamin D3, (cholecalciferol) 1,000 unit tablet Take 1,000 Units by mouth daily.       • cyanocobalamin (vitamin B-12) 1,000 mcg tablet Take 1,000 mcg by mouth daily.     • dutasteride (AVODART) 0.5 mg capsule Take 0.5 mg by mouth nightly.    0   • ezetimibe 10 mg tablet take 1 tablet by mouth NIGHTLY 90 tablet 3   • ferrous sulfate 325 mg (65 mg iron) tablet Take 65 mg by mouth every other day.       • ipratropium (ATROVENT) 0.03 % nasal spray Administer 0.03 % into affected nostril(s) as needed. 2 sprays by intranasal route 2-3 times every day in each nostril      • LACTOBACILLUS ACIDOPHILUS  (PROBIOTIC ORAL) Take by mouth daily.     • lisinopriL (PRINIVIL) 10 mg tablet Take 1 tablet (10 mg total) by mouth daily. 90 tablet 3   • metFORMIN (GLUCOPHAGE) 500 mg tablet Take 500 mg by mouth daily with breakfast.       • metoprolol succinate XL (TOPROL-XL) 50 mg 24 hr tablet Take 50 mg by mouth nightly.       • potassium chloride (MICRO-K) 10 mEq CR capsule Take 10 mEq by mouth every other day.       • silodosin (RAPAFLO) 8 mg capsule Take 8 mg by mouth nightly.         No current facility-administered medications for this visit.       Review of Systems   Constitutional: Negative for malaise/fatigue.   HENT: Negative for hearing loss.    Eyes: Negative for visual disturbance.   Cardiovascular: Negative for chest pain, dyspnea on exertion, irregular heartbeat, leg swelling, near-syncope, orthopnea, palpitations, paroxysmal nocturnal dyspnea and syncope.   Respiratory: Negative for cough and shortness of breath.    Hematologic/Lymphatic: Negative for bleeding problem.   Skin: Negative for rash.   Musculoskeletal: Negative for joint pain and muscle weakness.   Gastrointestinal: Negative for abdominal pain.   Genitourinary: Negative for hematuria.   Neurological: Negative for focal weakness, paresthesias, tremors and weakness.   Psychiatric/Behavioral: Negative for altered mental status.       Objective   Vitals:    03/08/22 1315   BP: 122/64   Resp: 15     BP Readings from Last 3 Encounters:   03/08/22 122/64   03/08/22 (!) 112/58   09/07/21 130/72       Physical Exam  Constitutional:       Appearance: He is well-developed.   HENT:      Head: Normocephalic and atraumatic.   Eyes:      Conjunctiva/sclera: Conjunctivae normal.      Pupils: Pupils are equal, round, and reactive to light.   Cardiovascular:      Rate and Rhythm: Normal rate. Rhythm irregular.      Heart sounds: Murmur heard.    Harsh midsystolic murmur is present with a grade of 3/6 at the upper right sternal border radiating to the neck.  Pulmonary:       Breath sounds: Normal breath sounds. No wheezing or rales.   Abdominal:      General: Bowel sounds are normal.      Palpations: Abdomen is soft. There is no mass.      Tenderness: There is no abdominal tenderness.   Musculoskeletal:         General: No deformity. Normal range of motion.      Cervical back: Normal range of motion.   Skin:     General: Skin is warm and dry.      Findings: No rash.   Neurological:      Mental Status: He is alert and oriented to person, place, and time.      Cranial Nerves: No cranial nerve deficit.   Psychiatric:         Behavior: Behavior normal.         Lab Results   Component Value Date    WBC 9.66 11/28/2019    HGB 12.6 (L) 11/28/2019     11/28/2019    CHOL 183 12/20/2017    TRIG 248 (H) 12/20/2017    HDL 42 (L) 12/20/2017    ALT 24 11/13/2019    AST 20 11/13/2019     11/28/2019    K 4.4 11/28/2019    CREATININE 1.2 11/28/2019    TSH 2.00 07/23/2016    INR 1.3 11/27/2019       Cardiovascular Procedures:    CATH LAB:  Cath - 11/1/2007    ECHO/MUGA:  Echo - 7/15/2010  Echo (Scanned Transthoracic Echo. EF 50%.) - 11/1/2016  Echo (scanned Transthoracic Echocardiography Report EF 45 %) - 12/20/2017.Technically difficult and suboptimal echocardiogram secondary to patient characteristics.Mild concentric left ventricular hypertrophy with normal cavity size and mildly decreased systolic function. Ejection fraction 45%.Unable to evaluate for wall motion abnormalities given difficulty visualizing endocardial borders.Severe aortic stenosis with peak velocity of 4.1 m/s (Peak instantaneous gradient of 66mmHg) and mean gradient of 34 mmHg.Calculated aortic valve area 0.77cm2. Mild aortic insufficiency.Mild mitral regurgitation. Moderately dilated left atrium.Moderately dilated right ventricular size with preserved systolic function. Mild tricuspid regurgitation with estimated right ventricular systolic pressure of 46 mmHg. Compared to the prior study from 11/2/16, left  ventricular function has mildly decreased frrom 50% to 45%. Aortic valve peak gradient has increased from 54mmHg to 66mHg and mean gradient has increased from 24mmHg to 34 mmHg.  Echo 4/24/2019 Critical calcific aortic valve stenosis, aortic valve area 0.66 cm².  · Mildly dilated left ventricular cavity. Moderate concentric left ventricular hypertrophy. Mildly decreased systolic function. Estimated EF = 40- 45%. Diffuse hypokinesis.  · Normal-sized right ventricular cavity with preserved systolic function.  · Mildly dilated right atrium.  · Moderately dilated left atrium.  · Severe mitral valve calcification of the posterior leaflet. Sclerotic mitral valve. Mild mitral valve regurgitation.  · Mild tricuspid valve regurgitation with RVSP of 63 mmHg.  · Aortic root calcificied. The sinuses of Valsalva calcified. Effacement of the sinotubular junction.  · IVC not well visualized.  Normal pericardium. No evidence of pericardial effusion.    Echocardiogram 9/7/2021  1.  Technically adequate study.  2.  The rhythm appears to be atrial fibrillation with electronic ventricular pacing.  3.  Mildly dilated left ventricle with overall mildly diminished left ventricular systolic function.  Ejection fraction estimated at 40 to 45%.  Abnormal wall motion secondary to a paced rhythm.  4.  Status post transcatheter heart valve with a #29 Medtronic device in the aortic position.  No significant aortic stenosis.  Mild paravalvular aortic insufficiency.  Estimated aortic valve area 1.7 to 1.8 cm².  5.  Moderately dilated left atrium with mild mitral annular calcification and trace to mild mitral regurgitation.  6.  Trace pulmonic insufficiency.  7.  Normal left ventricular cavity size with normal wall thickness and preserved systolic function.  Pacemaker leads are present in the right ventricle and right atrium.  8.  Trace to mild tricuspid regurgitation.  9.  Compared to a study of December 17, 2019, no significant  change.    ELECTROPHYSIOLOGY:  Devices (Single Chamber PPM (Biotronik-Evia SRT), serial # [id number], ventricular lead(9/16/13)biotronik S60) -9/16/2013    STRESS TESTS:  SEH - 5/20/2009    VASCULAR:  Carotid Duplex (scanned Vascular Carotid) - 12/22/2014    CT/MRI:  Abd/Pelvis CT - 2/7/2012    OTHER:  CXR - 12/3/2011  Other (ABDOMINAL AORTIC US) - 1/20/2011  Other (CAROTID US) - 1/20/2011    TAVR 11/27/2019  s/p successful TF TAVR with 29 mm Evolut Pro    ECG Electronic ventricular pacemaker   Pacemaker ECG,    Assessment   Problem List Items Addressed This Visit        Circulatory    Atrial fibrillation (CMS/HCC) - Primary (Chronic)     Chronic and asymptomatic.  He is anticoagulated on Eliquis 5 mg twice daily with no evidence of thromboembolic or hemorrhagic events.           Heart block (Chronic)     Chronic atrial fibrillation with heart block.            S/P TAVR (transcatheter aortic valve replacement) (Chronic)     Status post transcatheter heart valve with a #29 Medtronic device in the aortic position.  Followed closely by Dr. Mejia           Coronary artery disease involving native coronary artery without angina pectoris     He has a history of coronary heart disease status post percutaneous coronary intervention and bypass graft surgery.  Followed by Dr. Mejia.  No complaints of anginal type symptoms.           Pacemaker     Biotronik Evia single-chamber pacemaker implanted September 16, 2013.  Remaining battery longevity is 1 year 6 months   Parameter summary is VVI-CLS 60 bpm.  R wave is 21.3 mV, threshold 0.8 V at 0.4 ms and lead impedance 585 ohms.  90% ventricular pacing.  We will continue to monitor him closely.    If he does undergo laminectomy as anticipated, a magnet should be secured over his device due to his heart block.  If positioning does not allow for a magnet to remain secured then reprogramming the device to VOO at 60 bpm should be completed                      Stevo Meyer,  MD  3/24/2022

## 2022-04-07 NOTE — ASSESSMENT & PLAN NOTE
1991 LIMA to LAD, ARTURO to RDA, SVG to OM    We will follow up with him in the office in 6 months.  He will have a resting echocardiogram at that time.  He knows to contact us prior to then with any signs or symptoms of concern.     Start time: 1513  End time: 1540    Returned call to CM, Vero Arreaga.  Discussed impression from today's visit and plan.  Vero was surprised that he did not want to restart psychiatric medications today as he has been informing her that he wants to restart psychiatric medications.  She thinks this may be because she talked to him yesterday she informed this writer that he was using methamphetamine and he was upset about this as he informed her not to disclose this to me.  Since then, he has not talked to her.  But they have a plan to have phone conversation tomorrow (already planned).    Vero said pt reported depression x 2 weeks, but went to [place] for vacation x 1 week and also was supposed to go to [facility] after [place], but did not go.  Unsure if this was hypomanic behavior or feeling better so he can enjoy the vacation.   He is planning to go to spend time with parents for 1 months though this is where he was arrested recently.  Vero thinks parents and pt would be safe having a visit, unsure this arrest involved any safety issue other than possession of substance.    Vero also noted that on Friday late afternoon, pt typically calls her and ask for hospitalization.  Last week, this was the case and when her supervisor had to coordinate [facility] visit, pt decided not to go after everything was arranged.  Also, PHP and detox were arranged and pt decided not to go after it was arranged.      Vero will discuss with her supervisor, there may be possibility of revoking commitment in the future if he does not do well.  At this time, she also does not see that he is in imminent risk of self harm or harm to others and does not need hospitalization.    Also discussed this writer's impression that from time that he has been seen, pt has not reported of any willingness to restart the medications.  It also appears pt was seen by PCP on 3/21/2022 for anxiety and prescribed Hydroxyzine, but did not report any problem with mood or  sleep. Pt also did not set up follow up appointment though recommended.  Pt has hx of not following up and see new psych provider.  This may be possibility.  Also discussed if commitment will be revoked at one point, this writer will support revocation.  Epic messaged medical director and covering provider possibility or revocation.    Regine Last CNP, 4/7/2022

## 2022-09-13 ENCOUNTER — OFFICE VISIT (OUTPATIENT)
Dept: CARDIOLOGY | Facility: CLINIC | Age: 87
End: 2022-09-13
Payer: MEDICARE

## 2022-09-13 VITALS
DIASTOLIC BLOOD PRESSURE: 82 MMHG | BODY MASS INDEX: 27.97 KG/M2 | WEIGHT: 217.9 LBS | HEART RATE: 67 BPM | RESPIRATION RATE: 16 BRPM | SYSTOLIC BLOOD PRESSURE: 112 MMHG | HEIGHT: 74 IN

## 2022-09-13 VITALS
HEART RATE: 76 BPM | SYSTOLIC BLOOD PRESSURE: 122 MMHG | HEIGHT: 74 IN | BODY MASS INDEX: 27.98 KG/M2 | RESPIRATION RATE: 16 BRPM | DIASTOLIC BLOOD PRESSURE: 78 MMHG | OXYGEN SATURATION: 96 % | WEIGHT: 218 LBS

## 2022-09-13 DIAGNOSIS — Z95.2 S/P TAVR (TRANSCATHETER AORTIC VALVE REPLACEMENT): Chronic | ICD-10-CM

## 2022-09-13 DIAGNOSIS — Z95.0 PACEMAKER: ICD-10-CM

## 2022-09-13 DIAGNOSIS — Z95.1 S/P CABG (CORONARY ARTERY BYPASS GRAFT): Chronic | ICD-10-CM

## 2022-09-13 DIAGNOSIS — I48.20 CHRONIC ATRIAL FIBRILLATION (CMS/HCC): Primary | ICD-10-CM

## 2022-09-13 DIAGNOSIS — I50.43 CHF (CONGESTIVE HEART FAILURE), NYHA CLASS III, ACUTE ON CHRONIC, COMBINED (CMS/HCC): ICD-10-CM

## 2022-09-13 DIAGNOSIS — Z95.2 S/P TAVR (TRANSCATHETER AORTIC VALVE REPLACEMENT): Primary | Chronic | ICD-10-CM

## 2022-09-13 DIAGNOSIS — I48.20 CHRONIC ATRIAL FIBRILLATION (CMS/HCC): Chronic | ICD-10-CM

## 2022-09-13 DIAGNOSIS — I25.10 CORONARY ARTERY DISEASE INVOLVING NATIVE CORONARY ARTERY OF NATIVE HEART WITHOUT ANGINA PECTORIS: ICD-10-CM

## 2022-09-13 PROCEDURE — 99213 OFFICE O/P EST LOW 20 MIN: CPT | Performed by: INTERNAL MEDICINE

## 2022-09-13 PROCEDURE — 99214 OFFICE O/P EST MOD 30 MIN: CPT | Performed by: INTERNAL MEDICINE

## 2022-09-13 PROCEDURE — 93000 ELECTROCARDIOGRAM COMPLETE: CPT | Performed by: INTERNAL MEDICINE

## 2022-09-13 ASSESSMENT — ENCOUNTER SYMPTOMS
TREMORS: 0
ORTHOPNEA: 0
COUGH: 0
IRREGULAR HEARTBEAT: 0
BACK PAIN: 1
BLURRED VISION: 0
WEIGHT GAIN: 0
BLOATING: 0
ENDOCRINE NEGATIVE: 1
WEIGHT LOSS: 0
PARESTHESIAS: 0
ALLERGIC/IMMUNOLOGIC NEGATIVE: 1
WEAKNESS: 0
ALTERED MENTAL STATUS: 0
SLEEP DISTURBANCES DUE TO BREATHING: 0
HEMATURIA: 0
FEVER: 0
FOCAL WEAKNESS: 0
ORTHOPNEA: 0
DECREASED APPETITE: 0
PND: 0
SYNCOPE: 0
SHORTNESS OF BREATH: 0
FLANK PAIN: 0
HEARTBURN: 0
PALPITATIONS: 0
HEMATURIA: 0
PND: 0
DYSPNEA ON EXERTION: 0
DIZZINESS: 0
COUGH: 0
NEAR-SYNCOPE: 0
ABDOMINAL PAIN: 0
BRUISES/BLEEDS EASILY: 0
WEAKNESS: 0
CHANGE IN BOWEL HABIT: 0
PSYCHIATRIC NEGATIVE: 1
LIGHT-HEADEDNESS: 0

## 2022-09-13 NOTE — ASSESSMENT & PLAN NOTE
He has a history of coronary artery disease and status post PCI intervention and coronary artery bypass surgery.  He follows closely with Dr. Mejia.

## 2022-09-13 NOTE — LETTER
September 13, 2022     Aguilar Phan MD  [address]  [facility] [address]    Patient: Cain Pruitt  YOB: 1932  Date of Visit: 9/13/2022      Dear Dr. Phan:    Thank you for referring Cain Pruitt to me for evaluation. Below are my notes for this consultation.    If you have questions, please do not hesitate to call me. I look forward to following your patient along with you.         Sincerely,        Efe Mejia MD        CC: MD Bryant Andre MD Steven A Rothman, MD Coady, Paul M, MD  9/13/2022  1:12 PM  Signed     Cardiology Note       Reason for visit: Scheduled cardiology follow-up    Chief Complaint   Patient presents with    Atrial Fibrillation   Permanent pacemaker  Obstructive sleep apnea  Systemic hypertension  Coronary artery disease status post coronary bypass graft surgery.  Severe symptomatic aortic stenosis status post transcatheter aortic valve replacement   HPI   Cain Pruitt is a 89 y.o. male who presents for cardiac follow-up.  He voices no cardiovascular complaints and states that his breathing is comfortable, his level of activity acceptable.  No symptoms of PND.  No orthopnea.  No ankle edema.  No symptoms of angina.    He did require back surgery which was performed a few months ago.  This procedure was uncomplicated and has resulted in significant improvement in his ability to ambulate with less pain.  He reports that he is active and vigorous to his satisfaction.    He denies bleeding on Eliquis.  He has a good appetite and sleeps well.  He does not use a CPAP.  His bowels are functioning normally.      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     Colon polyps      "Coronary artery disease 1991    s/p cabg     Decreased cardiac ejection fraction 1/24/2019    Edema     Erectile dysfunction     GI (gastrointestinal bleed)     GI (gastrointestinal bleed) 06/2016    Heart block 1/24/2019    History of percutaneous coronary intervention 8/12/2019    BMS to SVG to OM 2007    History of transfusion 2011    during colon resection had \"nicked artery\"- required multiple unitos pf blood     Hyperlipidemia     Hypertension     Hypotension     Lumbar herniated disc     SIENA (obstructive sleep apnea) 8/12/2019    Osteomyelitis of toe of right foot (CMS/[facility]) 2016    s/p 2nd and 3rd toe osteomylitis     Pacemaker 1/24/2019    PAD (peripheral artery disease) (CMS/[facility]) 8/12/2019    Peripheral neuropathy     tips of fingers  and feet     S/P CABG (coronary artery bypass graft) 1991 1991 LIMA to LAD, ARTURO to RDA, SVG to OM    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    Transfemoral TAVR #29 Evolut Pro on 11-    Sleep apnea     Syncope     2008,2011    Type 2 diabetes mellitus (CMS/HCC)     last HgBA1C was 7.2 in 7/2019 managed by PCP Dr Aguilar Phan    Urinary retention     self cath's 3 times per day,     Vitamin D deficiency      Past Surgical History:   Procedure Laterality Date    AMPUTATION FOOT / TOE Right 2016    2nd and 3rd right foot from osteomylitis     BOWEL RESECTION  2011    CARDIAC PACEMAKER PLACEMENT  2013    CARDIAC SURGERY      CARDIOVERSION  2008    CORONARY ARTERY BYPASS GRAFT  1991    x 4 bypasses     CORONARY STENT PLACEMENT      3 stents    KNEE SURGERY Right 1951    MOHS SURGERY      SKIN BIOPSY       Social History     Socioeconomic History    Marital status:      Spouse name: None    Number of children: None    Years of education: None    Highest education level: None   Tobacco Use    Smoking status: Never Smoker    Smokeless tobacco: Never Used   Vaping Use    Vaping Use: Never used   Substance " and Sexual Activity    Alcohol use: Yes     Comment: occassionally/social    Drug use: Defer    Sexual activity: Defer     Family History   Problem Relation Age of Onset    Clotting disorder Biological Mother     Leukemia Biological Father      Patient has no known allergies.  Current Outpatient Medications   Medication Sig Dispense Refill    apixaban (ELIQUIS) 5 mg tablet Take 5 mg by mouth 2 (two) times a day. To stop 2 days pre-op       aspirin 81 mg enteric coated tablet Take 1 tablet (81 mg total) by mouth daily. 30 tablet 0    bumetanide (BUMEX) 0.5 mg tablet Take 0.5 mg by mouth every morning.        cholecalciferol, vitamin D3, 1,000 unit (25 mcg) tablet Take 1,000 Units by mouth daily.        cyanocobalamin (VITAMIN B12) 1,000 mcg tablet Take 1,000 mcg by mouth daily.      dutasteride (AVODART) 0.5 mg capsule Take 0.5 mg by mouth nightly.    0    ezetimibe 10 mg tablet take 1 tablet by mouth NIGHTLY 90 tablet 3    ferrous sulfate 325 mg (65 mg iron) tablet Take 65 mg by mouth every other day.        ipratropium (ATROVENT) 0.03 % nasal spray Administer 0.03 % into affected nostril(s) as needed. 2 sprays by intranasal route 2-3 times every day in each nostril       LACTOBACILLUS ACIDOPHILUS (PROBIOTIC ORAL) Take by mouth daily.      lisinopriL (PRINIVIL) 10 mg tablet Take 1 tablet (10 mg total) by mouth daily. 90 tablet 3    metFORMIN (GLUCOPHAGE) 500 mg tablet Take 500 mg by mouth daily with breakfast.        metoprolol succinate XL (TOPROL-XL) 50 mg 24 hr tablet Take 50 mg by mouth nightly.        potassium chloride (MICRO-K) 10 mEq CR capsule Take 10 mEq by mouth every other day.        silodosin (RAPAFLO) 8 mg capsule Take 8 mg by mouth nightly.         No current facility-administered medications for this visit.       Review of Systems   Constitutional: Negative for decreased appetite, fever, weight gain and weight loss.   HENT: Negative for congestion and nosebleeds.    Eyes:  Negative for blurred vision and visual disturbance.   Cardiovascular: Negative for chest pain, leg swelling, orthopnea and paroxysmal nocturnal dyspnea.   Respiratory: Negative for cough and sleep disturbances due to breathing.    Endocrine: Negative.    Hematologic/Lymphatic: Negative for bleeding problem. Does not bruise/bleed easily.   Musculoskeletal: Positive for arthritis and back pain. Negative for joint pain.        Herniated disc at L4 and L5   Gastrointestinal: Negative for bloating, change in bowel habit and heartburn.   Genitourinary: Negative for flank pain and hematuria.   Neurological: Negative for dizziness, light-headedness and weakness.   Psychiatric/Behavioral: Negative.    Allergic/Immunologic: Negative.      Objective      Weight 218 pounds  Blood pressure 126/72 seated position left arm.  Resting room air saturation 96% with a respiratory rate of 16/min.  Pulse 70 bpm and regular.      Physical Exam:    General Appearance:  Alert, no distress   Head:  Normocephalic, without obvious abnormality, atraumatic   Eyes:  Conjunctiva/corneas clear, EOM's intact   Neck: No thyroid enlargement. No JVD. No bruits    Lungs:   Clear to auscultation bilaterally, respirations unlabored, no rales, no wheezing   Heart:  Regular rhythm, S1 and S2 normal,  Systolic ejection murmur, rub or gallop.  Single extrasystole heard.   Abdomen:   Soft, non-tender, no masses, no organomegaly   Vascular: Pulses 2+ and symmetric all extremities, no carotid bruit or jugular vein distention   Musculoskeletal:  Skin: No injury or deformity  Skin color, texture, turgor normal, no rashes or lesions, no cyanosis or edema   Extremities: Extremities normal, atraumatic, pulses normal   Behavior/Emotional:  Neurologic: Appropriate, cooperative  No gross neurologic abnormalities.       Lab Results   Component Value Date    WBC 9.66 11/28/2019    HGB 12.6 (L) 11/28/2019     11/28/2019    CHOL 183 12/20/2017    TRIG 248 (H)  12/20/2017    HDL 42 (L) 12/20/2017    ALT 24 11/13/2019    AST 20 11/13/2019     11/28/2019    K 4.4 11/28/2019    CREATININE 1.2 11/28/2019    TSH 2.00 07/23/2016    INR 1.3 11/27/2019      Echocardiogram September 7, 2021  Summary:     1.  Technically adequate study.  2.  The rhythm appears to be atrial fibrillation with electronic ventricular pacing.  3.  Mildly dilated left ventricle with overall mildly diminished left ventricular systolic function.  Ejection fraction estimated at 40 to 45%.  Abnormal wall motion secondary to a paced rhythm.  4.  Status post transcatheter heart valve with a #29 Medtronic device in the aortic position.  No significant aortic stenosis.  Mild paravalvular aortic insufficiency.  Estimated aortic valve area 1.7 to 1.8 cm².  5.  Moderately dilated left atrium with mild mitral annular calcification and trace to mild mitral regurgitation.  6.  Trace pulmonic insufficiency.  7.  Normal left ventricular cavity size with normal wall thickness and preserved systolic function.  Pacemaker leads are present in the right ventricle and right atrium.  8.  Trace to mild tricuspid regurgitation.  9.  Compared to a study of December 17, 2019, no significant change.       ASSESSMENT/PLAN    SIENA (obstructive sleep apnea)  He does not use CPAP.     Atrial fibrillation (CMS/HCC)/Heart block/status post permanent pacemaker.  He remains on Eliquis and follows up with Dr. Meyer.  He has no bleeding and has not fallen.  Pacemaker follow-up was performed earlier today and found to be stable.  The ECG from that visit was reviewed.    S/P TAVR (transcatheter aortic valve replacement)  Transfemoral TAVR #29 Evolut Pro on 11-.  Clinically he continues to do well.  No specific complaints to suggest congestive heart failure.  The most recent echo demonstrated appropriate position and function of the prosthetic device.  His exam remained stable.    Coronary artery disease involving native coronary  artery without angina pectoris/S/P CABG (coronary artery bypass graft)/CHF (congestive heart failure), NYHA class III, acute on chronic, combined (CMS/[facility])  1991 LIMA to LAD, ARTURO to RDA, SVG to OM. He has no obvious angina or heart failure.  He will remain on his current medications.  His ejection fraction has not declined.      Hypertension  Under fair control today on his current medications.  They will remain unchanged.  Sodium limitation in his diet was emphasized.     Type 2 diabetes mellitus (CMS/[facility])  He is on oral therapy and follows up with his primary care physician regarding his diabetes.     Hyperlipidemia  He is on lipid-lowering therapy which he tolerates.  Laboratory studies were drawn on February 25, 2021.  Total cholesterol 170, triglycerides 196, HDL 40 with an LDL of 91.    In summary then, he continues to do well from a cardiovascular perspective.  No new medications advised.  No restrictions placed on his activity from a cardiovascular perspective.  Follow-up will be in 6 months.     Efe Mejia MD  9/13/2022

## 2022-09-13 NOTE — ASSESSMENT & PLAN NOTE
He has a normally functioning Medtronic single-chamber pacemaker that was implanted on 9/16/2013.  Battery life estimated at 1 year until [employer].  Mode is VVI-CLS with a base rate of 60.  There is 90% ventricular pacing.  1 episode of high ventricular rate.  Ventricular lead is measured R wave of 24.9 mV, lead impedance of 585 ohms, and a pacing threshold was 0.7 V at 1.0 ms.  Did not make any changes to the permanent programming of this device.

## 2022-09-13 NOTE — PROGRESS NOTES
"     Electrophysiology  Outpatient Progress Note       Reason for visit:   Chief Complaint   Patient presents with    Device Check       HPI   Cain Pruitt is a 89 y.o. male who is seen today in this office for follow up of chronic permanent atrial fibrillation and sick sinus syndrome.        He has a permanent pacemaker.  He has a history of coronary heart disease status post percutaneous coronary intervention and bypass graft surgery.         He presents today for routine follow-up with his son.  He denies chest pain, shortness of breath, lightheadedness, dizziness, palpitations.  He is recovering well from back surgery.    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     Colon polyps     Coronary artery disease 1991    s/p cabg     Decreased cardiac ejection fraction 1/24/2019    Edema     Erectile dysfunction     GI (gastrointestinal bleed)     GI (gastrointestinal bleed) 06/2016    Heart block 1/24/2019    History of percutaneous coronary intervention 8/12/2019    BMS to SVG to OM 2007    History of transfusion 2011    during colon resection had \"nicked artery\"- required multiple unitos pf blood     Hyperlipidemia     Hypertension     Hypotension     Lumbar herniated disc     SIENA (obstructive sleep apnea) 8/12/2019    Osteomyelitis of toe of right foot (CMS/[facility]) 2016    s/p 2nd and 3rd toe osteomylitis     Pacemaker 1/24/2019    PAD (peripheral artery disease) (CMS/[facility]) 8/12/2019    Peripheral neuropathy     tips of fingers  and feet     S/P CABG (coronary artery bypass graft) 1991 1991 LIMA to LAD, ARTURO to RDA, SVG to OM    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    Transfemoral TAVR #29 Evolut Pro on " 11-    Sleep apnea     Syncope     2008,2011    Type 2 diabetes mellitus (CMS/HCC)     last HgBA1C was 7.2 in 7/2019 managed by PCP Dr Aguilar Phan    Urinary retention     self cath's 3 times per day,     Vitamin D deficiency      Past Surgical History:   Procedure Laterality Date    AMPUTATION FOOT / TOE Right 2016    2nd and 3rd right foot from osteomylitis     BOWEL RESECTION  2011    CARDIAC PACEMAKER PLACEMENT  2013    CARDIAC SURGERY      CARDIOVERSION  2008    CORONARY ARTERY BYPASS GRAFT  1991    x 4 bypasses     CORONARY STENT PLACEMENT      3 stents    KNEE SURGERY Right 1951    MOHS SURGERY      SKIN BIOPSY         Social History     Tobacco Use    Smoking status: Never Smoker    Smokeless tobacco: Never Used   Vaping Use    Vaping Use: Never used   Substance Use Topics    Alcohol use: Yes     Comment: occassionally/social    Drug use: Defer     Family History   Problem Relation Age of Onset    Clotting disorder Biological Mother     Leukemia Biological Father        ALLERGY:  Patient has no known allergies.    Current Outpatient Medications   Medication Sig Dispense Refill    apixaban (ELIQUIS) 5 mg tablet Take 5 mg by mouth 2 (two) times a day. To stop 2 days pre-op       aspirin 81 mg enteric coated tablet Take 1 tablet (81 mg total) by mouth daily. 30 tablet 0    bumetanide (BUMEX) 0.5 mg tablet Take 0.5 mg by mouth every morning.        cholecalciferol, vitamin D3, 1,000 unit (25 mcg) tablet Take 1,000 Units by mouth daily.        cyanocobalamin (VITAMIN B12) 1,000 mcg tablet Take 1,000 mcg by mouth daily.      dutasteride (AVODART) 0.5 mg capsule Take 0.5 mg by mouth nightly.    0    ezetimibe 10 mg tablet take 1 tablet by mouth NIGHTLY 90 tablet 3    ferrous sulfate 325 mg (65 mg iron) tablet Take 65 mg by mouth every other day.        ipratropium (ATROVENT) 0.03 % nasal spray Administer 0.03 % into affected nostril(s) as needed. 2 sprays by intranasal route  2-3 times every day in each nostril       LACTOBACILLUS ACIDOPHILUS (PROBIOTIC ORAL) Take by mouth daily.      lisinopriL (PRINIVIL) 10 mg tablet Take 1 tablet (10 mg total) by mouth daily. 90 tablet 3    metFORMIN (GLUCOPHAGE) 500 mg tablet Take 500 mg by mouth daily with breakfast.        metoprolol succinate XL (TOPROL-XL) 50 mg 24 hr tablet Take 50 mg by mouth nightly.        potassium chloride (MICRO-K) 10 mEq CR capsule Take 10 mEq by mouth every other day.        silodosin (RAPAFLO) 8 mg capsule Take 8 mg by mouth nightly.         No current facility-administered medications for this visit.       Review of Systems   Constitutional: Negative for malaise/fatigue.   HENT: Negative for hearing loss.    Eyes: Negative for visual disturbance.   Cardiovascular: Negative for chest pain, dyspnea on exertion, irregular heartbeat, leg swelling, near-syncope, orthopnea, palpitations, paroxysmal nocturnal dyspnea and syncope.   Respiratory: Negative for cough and shortness of breath.    Hematologic/Lymphatic: Negative for bleeding problem.   Skin: Negative for rash.   Musculoskeletal: Negative for joint pain and muscle weakness.   Gastrointestinal: Negative for abdominal pain.   Genitourinary: Negative for hematuria.   Neurological: Negative for focal weakness, paresthesias, tremors and weakness.   Psychiatric/Behavioral: Negative for altered mental status.       Objective   Vitals:    09/13/22 1151   BP: 112/82   Pulse: 67   Resp: 16     BP Readings from Last 3 Encounters:   09/13/22 122/78   09/13/22 112/82   03/08/22 122/64       Physical Exam  Constitutional:       Appearance: He is well-developed.   HENT:      Head: Normocephalic and atraumatic.   Eyes:      Conjunctiva/sclera: Conjunctivae normal.      Pupils: Pupils are equal, round, and reactive to light.   Cardiovascular:      Rate and Rhythm: Normal rate. Rhythm irregular.      Heart sounds: Murmur heard.    Harsh midsystolic murmur is present with a grade  of 3/6 at the upper right sternal border radiating to the neck.  Pulmonary:      Breath sounds: Normal breath sounds. No wheezing or rales.   Abdominal:      General: Bowel sounds are normal.      Palpations: Abdomen is soft. There is no mass.      Tenderness: There is no abdominal tenderness.   Musculoskeletal:         General: No deformity. Normal range of motion.      Cervical back: Normal range of motion.   Skin:     General: Skin is warm and dry.      Findings: No rash.   Neurological:      Mental Status: He is alert and oriented to person, place, and time.      Cranial Nerves: No cranial nerve deficit.   Psychiatric:         Behavior: Behavior normal.         Lab Results   Component Value Date    WBC 9.66 11/28/2019    HGB 12.6 (L) 11/28/2019     11/28/2019    CHOL 183 12/20/2017    TRIG 248 (H) 12/20/2017    HDL 42 (L) 12/20/2017    ALT 24 11/13/2019    AST 20 11/13/2019     11/28/2019    K 4.4 11/28/2019    CREATININE 1.2 11/28/2019    TSH 2.00 07/23/2016    INR 1.3 11/27/2019       Cardiovascular Procedures:    CATH LAB:  Cath - 11/1/2007    ECHO/MUGA:  Echo - 7/15/2010  Echo (Scanned Transthoracic Echo. EF 50%.) - 11/1/2016  Echo (scanned Transthoracic Echocardiography Report EF 45 %) - 12/20/2017.Technically difficult and suboptimal echocardiogram secondary to patient characteristics.Mild concentric left ventricular hypertrophy with normal cavity size and mildly decreased systolic function. Ejection fraction 45%.Unable to evaluate for wall motion abnormalities given difficulty visualizing endocardial borders.Severe aortic stenosis with peak velocity of 4.1 m/s (Peak instantaneous gradient of 66mmHg) and mean gradient of 34 mmHg.Calculated aortic valve area 0.77cm2. Mild aortic insufficiency.Mild mitral regurgitation. Moderately dilated left atrium.Moderately dilated right ventricular size with preserved systolic function. Mild tricuspid regurgitation with estimated right ventricular systolic  pressure of 46 mmHg. Compared to the prior study from 11/2/16, left ventricular function has mildly decreased frrom 50% to 45%. Aortic valve peak gradient has increased from 54mmHg to 66mHg and mean gradient has increased from 24mmHg to 34 mmHg.  Echo 4/24/2019 Critical calcific aortic valve stenosis, aortic valve area 0.66 cm².  · Mildly dilated left ventricular cavity. Moderate concentric left ventricular hypertrophy. Mildly decreased systolic function. Estimated EF = 40- 45%. Diffuse hypokinesis.  · Normal-sized right ventricular cavity with preserved systolic function.  · Mildly dilated right atrium.  · Moderately dilated left atrium.  · Severe mitral valve calcification of the posterior leaflet. Sclerotic mitral valve. Mild mitral valve regurgitation.  · Mild tricuspid valve regurgitation with RVSP of 63 mmHg.  · Aortic root calcificied. The sinuses of Valsalva calcified. Effacement of the sinotubular junction.  · IVC not well visualized.  Normal pericardium. No evidence of pericardial effusion.    Echocardiogram 9/7/2021  1.  Technically adequate study.  2.  The rhythm appears to be atrial fibrillation with electronic ventricular pacing.  3.  Mildly dilated left ventricle with overall mildly diminished left ventricular systolic function.  Ejection fraction estimated at 40 to 45%.  Abnormal wall motion secondary to a paced rhythm.  4.  Status post transcatheter heart valve with a #29 Medtronic device in the aortic position.  No significant aortic stenosis.  Mild paravalvular aortic insufficiency.  Estimated aortic valve area 1.7 to 1.8 cm².  5.  Moderately dilated left atrium with mild mitral annular calcification and trace to mild mitral regurgitation.  6.  Trace pulmonic insufficiency.  7.  Normal left ventricular cavity size with normal wall thickness and preserved systolic function.  Pacemaker leads are present in the right ventricle and right atrium.  8.  Trace to mild tricuspid regurgitation.  9.   Compared to a study of December 17, 2019, no significant change.    ELECTROPHYSIOLOGY:  Devices (Single Chamber PPM (Biotronik-Evia SRT), serial # [id number], ventricular lead(9/16/13)biotronik S60) -9/16/2013    STRESS TESTS:  SEH - 5/20/2009    VASCULAR:  Carotid Duplex (scanned Vascular Carotid) - 12/22/2014    CT/MRI:  Abd/Pelvis CT - 2/7/2012    OTHER:  CXR - 12/3/2011  Other (ABDOMINAL AORTIC US) - 1/20/2011  Other (CAROTID US) - 1/20/2011    TAVR 11/27/2019  s/p successful TF TAVR with 29 mm Evolut Pro    ECG   Sinus rhythm with ventricular pacing     Assessment   Problem List Items Addressed This Visit        Circulatory    Atrial fibrillation (CMS/HCC) - Primary (Chronic)     He has a history of chronic asymptomatic atrial fibrillation.  He is anticoagulated on Eliquis 5 mg twice daily.  He is tolerating Eliquis well.           Relevant Orders    ECG 12 LEAD-OFFICE PERFORMED (Completed)    S/P TAVR (transcatheter aortic valve replacement) (Chronic)     He is status post TAVR in November 2019.  He follows closely with Dr. Mejia.           Coronary artery disease involving native coronary artery without angina pectoris     He has a history of coronary artery disease and status post PCI intervention and coronary artery bypass surgery.  He follows closely with Dr. Mejia.           Pacemaker     He has a normally functioning Medtronic single-chamber pacemaker that was implanted on 9/16/2013.  Battery life estimated at 1 year until [employer].  Mode is VVI-CLS with a base rate of 60.  There is 90% ventricular pacing.  1 episode of high ventricular rate.  Ventricular lead is measured R wave of 24.9 mV, lead impedance of 585 ohms, and a pacing threshold was 0.7 V at 1.0 ms.  Did not make any changes to the permanent programming of this device.                      Stevo Meyer MD  9/21/2022

## 2022-09-13 NOTE — PROGRESS NOTES
"   Cardiology Note       Reason for visit: Scheduled cardiology follow-up    Chief Complaint   Patient presents with    Atrial Fibrillation   Permanent pacemaker  Obstructive sleep apnea  Systemic hypertension  Coronary artery disease status post coronary bypass graft surgery.  Severe symptomatic aortic stenosis status post transcatheter aortic valve replacement   HPI   Cain Pruitt is a 89 y.o. male who presents for cardiac follow-up.  He voices no cardiovascular complaints and states that his breathing is comfortable, his level of activity acceptable.  No symptoms of PND.  No orthopnea.  No ankle edema.  No symptoms of angina.    He did require back surgery which was performed a few months ago.  This procedure was uncomplicated and has resulted in significant improvement in his ability to ambulate with less pain.  He reports that he is active and vigorous to his satisfaction.    He denies bleeding on Eliquis.  He has a good appetite and sleeps well.  He does not use a CPAP.  His bowels are functioning normally.      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     Colon polyps     Coronary artery disease 1991    s/p cabg     Decreased cardiac ejection fraction 1/24/2019    Edema     Erectile dysfunction     GI (gastrointestinal bleed)     GI (gastrointestinal bleed) 06/2016    Heart block 1/24/2019    History of percutaneous coronary intervention 8/12/2019    BMS to SVG to OM 2007    History of transfusion 2011    during colon resection had \"nicked artery\"- required multiple unitos pf blood     Hyperlipidemia     Hypertension     Hypotension     Lumbar herniated disc     SIENA (obstructive sleep apnea) 8/12/2019    Osteomyelitis of toe of " right foot (CMS/HCC) 2016    s/p 2nd and 3rd toe osteomylitis     Pacemaker 1/24/2019    PAD (peripheral artery disease) (CMS/HCC) 8/12/2019    Peripheral neuropathy     tips of fingers  and feet     S/P CABG (coronary artery bypass graft) 1991 1991 LIMA to LAD, ARTURO to RDA, SVG to OM    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    Transfemoral TAVR #29 Evolut Pro on 11-    Sleep apnea     Syncope     2008,2011    Type 2 diabetes mellitus (CMS/HCC)     last HgBA1C was 7.2 in 7/2019 managed by PCP Dr Aguilar Phan    Urinary retention     self cath's 3 times per day,     Vitamin D deficiency      Past Surgical History:   Procedure Laterality Date    AMPUTATION FOOT / TOE Right 2016    2nd and 3rd right foot from osteomylitis     BOWEL RESECTION  2011    CARDIAC PACEMAKER PLACEMENT  2013    CARDIAC SURGERY      CARDIOVERSION  2008    CORONARY ARTERY BYPASS GRAFT  1991    x 4 bypasses     CORONARY STENT PLACEMENT      3 stents    KNEE SURGERY Right 1951    MOHS SURGERY      SKIN BIOPSY       Social History     Socioeconomic History    Marital status:      Spouse name: None    Number of children: None    Years of education: None    Highest education level: None   Tobacco Use    Smoking status: Never Smoker    Smokeless tobacco: Never Used   Vaping Use    Vaping Use: Never used   Substance and Sexual Activity    Alcohol use: Yes     Comment: occassionally/social    Drug use: Defer    Sexual activity: Defer     Family History   Problem Relation Age of Onset    Clotting disorder Biological Mother     Leukemia Biological Father      Patient has no known allergies.  Current Outpatient Medications   Medication Sig Dispense Refill    apixaban (ELIQUIS) 5 mg tablet Take 5 mg by mouth 2 (two) times a day. To stop 2 days pre-op       aspirin 81 mg enteric coated tablet Take 1 tablet (81 mg total) by mouth daily. 30 tablet 0    bumetanide (BUMEX) 0.5 mg tablet  Take 0.5 mg by mouth every morning.        cholecalciferol, vitamin D3, 1,000 unit (25 mcg) tablet Take 1,000 Units by mouth daily.        cyanocobalamin (VITAMIN B12) 1,000 mcg tablet Take 1,000 mcg by mouth daily.      dutasteride (AVODART) 0.5 mg capsule Take 0.5 mg by mouth nightly.    0    ezetimibe 10 mg tablet take 1 tablet by mouth NIGHTLY 90 tablet 3    ferrous sulfate 325 mg (65 mg iron) tablet Take 65 mg by mouth every other day.        ipratropium (ATROVENT) 0.03 % nasal spray Administer 0.03 % into affected nostril(s) as needed. 2 sprays by intranasal route 2-3 times every day in each nostril       LACTOBACILLUS ACIDOPHILUS (PROBIOTIC ORAL) Take by mouth daily.      lisinopriL (PRINIVIL) 10 mg tablet Take 1 tablet (10 mg total) by mouth daily. 90 tablet 3    metFORMIN (GLUCOPHAGE) 500 mg tablet Take 500 mg by mouth daily with breakfast.        metoprolol succinate XL (TOPROL-XL) 50 mg 24 hr tablet Take 50 mg by mouth nightly.        potassium chloride (MICRO-K) 10 mEq CR capsule Take 10 mEq by mouth every other day.        silodosin (RAPAFLO) 8 mg capsule Take 8 mg by mouth nightly.         No current facility-administered medications for this visit.       Review of Systems   Constitutional: Negative for decreased appetite, fever, weight gain and weight loss.   HENT: Negative for congestion and nosebleeds.    Eyes: Negative for blurred vision and visual disturbance.   Cardiovascular: Negative for chest pain, leg swelling, orthopnea and paroxysmal nocturnal dyspnea.   Respiratory: Negative for cough and sleep disturbances due to breathing.    Endocrine: Negative.    Hematologic/Lymphatic: Negative for bleeding problem. Does not bruise/bleed easily.   Musculoskeletal: Positive for arthritis and back pain. Negative for joint pain.        Herniated disc at L4 and L5   Gastrointestinal: Negative for bloating, change in bowel habit and heartburn.   Genitourinary: Negative for flank pain and  hematuria.   Neurological: Negative for dizziness, light-headedness and weakness.   Psychiatric/Behavioral: Negative.    Allergic/Immunologic: Negative.      Objective      Weight 218 pounds  Blood pressure 126/72 seated position left arm.  Resting room air saturation 96% with a respiratory rate of 16/min.  Pulse 70 bpm and regular.      Physical Exam:    General Appearance:  Alert, no distress   Head:  Normocephalic, without obvious abnormality, atraumatic   Eyes:  Conjunctiva/corneas clear, EOM's intact   Neck: No thyroid enlargement. No JVD. No bruits    Lungs:   Clear to auscultation bilaterally, respirations unlabored, no rales, no wheezing   Heart:  Regular rhythm, S1 and S2 normal,  Systolic ejection murmur, rub or gallop.  Single extrasystole heard.   Abdomen:   Soft, non-tender, no masses, no organomegaly   Vascular: Pulses 2+ and symmetric all extremities, no carotid bruit or jugular vein distention   Musculoskeletal:  Skin: No injury or deformity  Skin color, texture, turgor normal, no rashes or lesions, no cyanosis or edema   Extremities: Extremities normal, atraumatic, pulses normal   Behavior/Emotional:  Neurologic: Appropriate, cooperative  No gross neurologic abnormalities.       Lab Results   Component Value Date    WBC 9.66 11/28/2019    HGB 12.6 (L) 11/28/2019     11/28/2019    CHOL 183 12/20/2017    TRIG 248 (H) 12/20/2017    HDL 42 (L) 12/20/2017    ALT 24 11/13/2019    AST 20 11/13/2019     11/28/2019    K 4.4 11/28/2019    CREATININE 1.2 11/28/2019    TSH 2.00 07/23/2016    INR 1.3 11/27/2019      Echocardiogram September 7, 2021  Summary:     1.  Technically adequate study.  2.  The rhythm appears to be atrial fibrillation with electronic ventricular pacing.  3.  Mildly dilated left ventricle with overall mildly diminished left ventricular systolic function.  Ejection fraction estimated at 40 to 45%.  Abnormal wall motion secondary to a paced rhythm.  4.  Status post  transcatheter heart valve with a #29 Medtronic device in the aortic position.  No significant aortic stenosis.  Mild paravalvular aortic insufficiency.  Estimated aortic valve area 1.7 to 1.8 cm².  5.  Moderately dilated left atrium with mild mitral annular calcification and trace to mild mitral regurgitation.  6.  Trace pulmonic insufficiency.  7.  Normal left ventricular cavity size with normal wall thickness and preserved systolic function.  Pacemaker leads are present in the right ventricle and right atrium.  8.  Trace to mild tricuspid regurgitation.  9.  Compared to a study of December 17, 2019, no significant change.       ASSESSMENT/PLAN    SIENA (obstructive sleep apnea)  He does not use CPAP.     Atrial fibrillation (CMS/[facility])/Heart block/status post permanent pacemaker.  He remains on Eliquis and follows up with Dr. Meyer.  He has no bleeding and has not fallen.  Pacemaker follow-up was performed earlier today and found to be stable.  The ECG from that visit was reviewed.    S/P TAVR (transcatheter aortic valve replacement)  Transfemoral TAVR #29 Evolut Pro on 11-.  Clinically he continues to do well.  No specific complaints to suggest congestive heart failure.  The most recent echo demonstrated appropriate position and function of the prosthetic device.  His exam remained stable.    Coronary artery disease involving native coronary artery without angina pectoris/S/P CABG (coronary artery bypass graft)/CHF (congestive heart failure), NYHA class III, acute on chronic, combined (CMS/[facility])  1991 LIMA to LAD, ARTURO to RDA, SVG to OM. He has no obvious angina or heart failure.  He will remain on his current medications.  His ejection fraction has not declined.      Hypertension  Under fair control today on his current medications.  They will remain unchanged.  Sodium limitation in his diet was emphasized.     Type 2 diabetes mellitus (CMS/[facility])  He is on oral therapy and follows up with his primary care  physician regarding his diabetes.     Hyperlipidemia  He is on lipid-lowering therapy which he tolerates.  Laboratory studies were drawn on February 25, 2021.  Total cholesterol 170, triglycerides 196, HDL 40 with an LDL of 91.    In summary then, he continues to do well from a cardiovascular perspective.  No new medications advised.  No restrictions placed on his activity from a cardiovascular perspective.  Follow-up will be in 6 months.     Efe Mejia MD  9/13/2022

## 2022-09-13 NOTE — ASSESSMENT & PLAN NOTE
He has a history of chronic asymptomatic atrial fibrillation.  He is anticoagulated on Eliquis 5 mg twice daily.  He is tolerating Eliquis well.

## 2023-02-16 ENCOUNTER — TELEPHONE (OUTPATIENT)
Dept: ENDOSCOPY | Facility: HOSPITAL | Age: 88
End: 2023-02-16
Payer: MEDICARE

## 2023-02-16 NOTE — TELEPHONE ENCOUNTER
I talked to the nursing coordinator at [facility] and also to son Pavel.  The patient has a history of previous AVMs of the stomach and presented with melena and syncope episode.  Apparently his presenting hemoglobin was 10 and he is hemodynamically stable according to the nurse.  They are recommending EGD.  The family is very uncomfortable with him at that hospital.  They called the transfer center but there are no beds available at [facility] which is not surprising at this time.  I have recommended that they proceed with upper endoscopy evaluation there.  Treatment of a gastric AVM should be able to be completed at their institution.

## 2023-03-13 ASSESSMENT — ENCOUNTER SYMPTOMS
FOCAL WEAKNESS: 0
SYNCOPE: 0
DYSPNEA ON EXERTION: 0
ALTERED MENTAL STATUS: 0
PND: 0
PALPITATIONS: 0
PARESTHESIAS: 0
COUGH: 0
WEAKNESS: 0
ABDOMINAL PAIN: 0
ORTHOPNEA: 0
TREMORS: 0
HEMATURIA: 0
IRREGULAR HEARTBEAT: 0
SHORTNESS OF BREATH: 0
NEAR-SYNCOPE: 0

## 2023-03-13 NOTE — PROGRESS NOTES
"     Electrophysiology  Outpatient Progress Note       Reason for visit:   Chief Complaint   Patient presents with   • Device Check       HPI   Cain Pruitt is a 89 y.o. male who is seen today in this office for follow up of chronic permanent atrial fibrillation and sick sinus syndrome.        He has a permanent pacemaker.  He has a history of coronary heart disease status post percutaneous coronary intervention and bypass graft surgery.        He presents today with his son for follow-up.  He reports he is feeling well and is without complaint.    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    • Colon polyps    • Coronary artery disease 1991    s/p cabg    • Decreased cardiac ejection fraction 1/24/2019   • Edema    • Erectile dysfunction    • GI (gastrointestinal bleed)    • GI (gastrointestinal bleed) 06/2016   • Heart block 1/24/2019   • History of percutaneous coronary intervention 8/12/2019    BMS to SVG to OM 2007   • History of transfusion 2011    during colon resection had \"nicked artery\"- required multiple unitos pf blood    • Hyperlipidemia    • Hypertension    • Hypotension    • Lumbar herniated disc    • SIENA (obstructive sleep apnea) 8/12/2019   • Osteomyelitis of toe of right foot (CMS/[facility]) 2016    s/p 2nd and 3rd toe osteomylitis    • Pacemaker 1/24/2019   • PAD (peripheral artery disease) (CMS/[facility]) 8/12/2019   • Peripheral neuropathy     tips of fingers  and feet    • S/P CABG (coronary artery bypass graft) 1991 1991 LIMA to LAD, ARTURO to RDA, SVG to OM   • 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)     last HgBA1C was 7.2 in 7/2019 managed by PCP Dr Aguilar Phan   • Urinary retention     self cath's 3 times per day,    • Vitamin D deficiency      Past Surgical History:   Procedure Laterality Date   • AMPUTATION FOOT / TOE Right 2016    2nd and 3rd right foot from osteomylitis    • BOWEL RESECTION  2011   • CARDIAC PACEMAKER PLACEMENT  2013   • CARDIAC SURGERY     • CARDIOVERSION  2008   • CORONARY ARTERY BYPASS GRAFT  1991    x 4 bypasses    • CORONARY STENT PLACEMENT      3 stents   • KNEE SURGERY Right 1951   • MOHS SURGERY     • SKIN BIOPSY         Social History     Tobacco Use   • Smoking status: Never   • Smokeless tobacco: Never   Vaping Use   • Vaping status: Never Used   Substance Use Topics   • Alcohol use: Yes     Comment: occassionally/social   • Drug use: Defer     Family History   Problem Relation Age of Onset   • Clotting disorder Biological Mother    • Leukemia Biological Father        ALLERGY:  Patient has no known allergies.    Current Outpatient Medications   Medication Sig Dispense Refill   • aspirin 81 mg enteric coated tablet Take 1 tablet (81 mg total) by mouth daily. 30 tablet 0   • bumetanide (BUMEX) 0.5 mg tablet Take 0.5 mg by mouth every morning.       • cholecalciferol, vitamin D3, 1,000 unit (25 mcg) tablet Take 1,000 Units by mouth daily.       • cyanocobalamin (VITAMIN B12) 1,000 mcg tablet Take 1,000 mcg by mouth daily.     • dutasteride (AVODART) 0.5 mg capsule Take 0.5 mg by mouth nightly.    0   • ezetimibe 10 mg tablet take 1 tablet by mouth NIGHTLY 90 tablet 3   • ferrous sulfate 325 mg (65 mg iron) tablet Take 65 mg by mouth every other day.       • ipratropium (ATROVENT) 0.03 % nasal spray Administer 0.03 % into affected nostril(s) as needed. 2 sprays by intranasal route 2-3 times every day in each nostril      • LACTOBACILLUS ACIDOPHILUS (PROBIOTIC ORAL) Take by mouth daily.     • lisinopriL (PRINIVIL) 10 mg tablet Take 1 tablet (10 mg total) by mouth daily.  90 tablet 3   • metFORMIN (GLUCOPHAGE) 500 mg tablet Take 500 mg by mouth daily with breakfast.       • metoprolol succinate XL (TOPROL-XL) 50 mg 24 hr tablet Take 50 mg by mouth nightly.       • omeprazole (PriLOSEC) 20 mg capsule Take 20 mg by mouth once.     • potassium chloride (MICRO-K) 10 mEq CR capsule Take 10 mEq by mouth every other day.       • silodosin (RAPAFLO) 8 mg capsule Take 8 mg by mouth nightly.       • apixaban (ELIQUIS) 5 mg tablet Take 0.5 tablets (2.5 mg total) by mouth 2 (two) times a day. To stop 2 days pre-op       No current facility-administered medications for this visit.       Review of Systems   Constitutional: Negative for malaise/fatigue.   HENT: Negative for hearing loss.    Eyes: Negative for visual disturbance.   Cardiovascular: Negative for chest pain, dyspnea on exertion, irregular heartbeat, leg swelling, near-syncope, orthopnea, palpitations, paroxysmal nocturnal dyspnea and syncope.   Respiratory: Negative for cough and shortness of breath.    Hematologic/Lymphatic: Negative for bleeding problem.   Skin: Negative for rash.   Musculoskeletal: Negative for joint pain and muscle weakness.   Gastrointestinal: Negative for abdominal pain.   Genitourinary: Negative for hematuria.   Neurological: Negative for focal weakness, paresthesias, tremors and weakness.   Psychiatric/Behavioral: Negative for altered mental status.       Objective   Vitals:    03/14/23 1252   BP: 136/60   Pulse: 66   Resp: 16     BP Readings from Last 3 Encounters:   03/16/23 119/60   03/14/23 138/60   03/14/23 136/60       Physical Exam  Constitutional:       Appearance: He is well-developed.   HENT:      Head: Normocephalic and atraumatic.   Eyes:      Conjunctiva/sclera: Conjunctivae normal.      Pupils: Pupils are equal, round, and reactive to light.   Cardiovascular:      Rate and Rhythm: Normal rate. Rhythm irregular.      Heart sounds: Murmur heard.       Harsh midsystolic murmur is present with a grade  of 3/6 at the upper right sternal border radiating to the neck.  Pulmonary:      Breath sounds: Normal breath sounds. No wheezing or rales.   Abdominal:      General: Bowel sounds are normal.      Palpations: Abdomen is soft. There is no mass.      Tenderness: There is no abdominal tenderness.   Musculoskeletal:         General: No deformity. Normal range of motion.      Cervical back: Normal range of motion.   Skin:     General: Skin is warm and dry.      Findings: No rash.   Neurological:      Mental Status: He is alert and oriented to person, place, and time.      Cranial Nerves: No cranial nerve deficit.   Psychiatric:         Behavior: Behavior normal.         Lab Results   Component Value Date    WBC 9.66 11/28/2019    HGB 12.6 (L) 11/28/2019     11/28/2019    CHOL 183 12/20/2017    TRIG 248 (H) 12/20/2017    HDL 42 (L) 12/20/2017    ALT 24 11/13/2019    AST 20 11/13/2019     11/28/2019    K 4.4 11/28/2019    CREATININE 1.2 11/28/2019    TSH 2.00 07/23/2016    INR 1.3 11/27/2019       Cardiovascular Procedures:    CATH LAB:  Cath - 11/1/2007    ECHO/MUGA:  Echo - 7/15/2010  Echo (Scanned Transthoracic Echo. EF 50%.) - 11/1/2016  Echo (scanned Transthoracic Echocardiography Report EF 45 %) - 12/20/2017.Technically difficult and suboptimal echocardiogram secondary to patient characteristics.Mild concentric left ventricular hypertrophy with normal cavity size and mildly decreased systolic function. Ejection fraction 45%.Unable to evaluate for wall motion abnormalities given difficulty visualizing endocardial borders.Severe aortic stenosis with peak velocity of 4.1 m/s (Peak instantaneous gradient of 66mmHg) and mean gradient of 34 mmHg.Calculated aortic valve area 0.77cm2. Mild aortic insufficiency.Mild mitral regurgitation. Moderately dilated left atrium.Moderately dilated right ventricular size with preserved systolic function. Mild tricuspid regurgitation with estimated right ventricular systolic  pressure of 46 mmHg. Compared to the prior study from 11/2/16, left ventricular function has mildly decreased frrom 50% to 45%. Aortic valve peak gradient has increased from 54mmHg to 66mHg and mean gradient has increased from 24mmHg to 34 mmHg.  Echo 4/24/2019 Critical calcific aortic valve stenosis, aortic valve area 0.66 cm².  · Mildly dilated left ventricular cavity. Moderate concentric left ventricular hypertrophy. Mildly decreased systolic function. Estimated EF = 40- 45%. Diffuse hypokinesis.  · Normal-sized right ventricular cavity with preserved systolic function.  · Mildly dilated right atrium.  · Moderately dilated left atrium.  · Severe mitral valve calcification of the posterior leaflet. Sclerotic mitral valve. Mild mitral valve regurgitation.  · Mild tricuspid valve regurgitation with RVSP of 63 mmHg.  · Aortic root calcificied. The sinuses of Valsalva calcified. Effacement of the sinotubular junction.  · IVC not well visualized.  Normal pericardium. No evidence of pericardial effusion.    Echocardiogram 9/7/2021  1.  Technically adequate study.  2.  The rhythm appears to be atrial fibrillation with electronic ventricular pacing.  3.  Mildly dilated left ventricle with overall mildly diminished left ventricular systolic function.  Ejection fraction estimated at 40 to 45%.  Abnormal wall motion secondary to a paced rhythm.  4.  Status post transcatheter heart valve with a #29 Medtronic device in the aortic position.  No significant aortic stenosis.  Mild paravalvular aortic insufficiency.  Estimated aortic valve area 1.7 to 1.8 cm².  5.  Moderately dilated left atrium with mild mitral annular calcification and trace to mild mitral regurgitation.  6.  Trace pulmonic insufficiency.  7.  Normal left ventricular cavity size with normal wall thickness and preserved systolic function.  Pacemaker leads are present in the right ventricle and right atrium.  8.  Trace to mild tricuspid regurgitation.  9.   Compared to a study of December 17, 2019, no significant change.    ELECTROPHYSIOLOGY:  Devices (Single Chamber PPM (Biotronik-Evia SRT), serial # [id number], ventricular lead(9/16/13)biotronik S60) -9/16/2013    STRESS TESTS:  SEH - 5/20/2009    VASCULAR:  Carotid Duplex (scanned Vascular Carotid) - 12/22/2014    CT/MRI:  Abd/Pelvis CT - 2/7/2012    OTHER:  CXR - 12/3/2011  Other (ABDOMINAL AORTIC US) - 1/20/2011  Other (CAROTID US) - 1/20/2011    TAVR 11/27/2019  s/p successful TF TAVR with 29 mm Evolut Pro    ECG   V pacing    Assessment   Problem List Items Addressed This Visit        Circulatory    Atrial fibrillation (CMS/HCC) - Primary (Chronic)     He has chronic asymptomatic atrial fibrillation.  He is anticoagulated on Eliquis 5 mg twice daily and is tolerating it well without signs or symptoms of bleeding.         Relevant Orders    ECG 12 LEAD-OFFICE PERFORMED (Completed)    S/P TAVR (transcatheter aortic valve replacement) (Chronic)     He is status post TAVR in November 2019.  He follows closely with Dr. Mejia.         Coronary artery disease involving native coronary artery without angina pectoris     He has a history of coronary artery disease and status post PCI intervention and coronary artery bypass surgery.  He follows closely with Dr. Mejia.         Pacemaker     He has a Biotronik single-chamber pacemaker that was implanted on 9/16/2013.  Battery longevity is 6 months until MARTÍN.  Mode is VVI-CLS with a base rate of 60.  The ventricular lead has a measured R wave of 21.4 mV, and a pacing threshold of 1.0 V at 1.0 ms, and a lead impedance of 565 ohms.  There is ventricular pacing 91% of the time.  There were no changes made to the permanent programming of this device.    He is aware that he will need generator change in approximately 6 months.  He is enrolled in remote monitoring.  He will follow-up in 6 months and at that time we will arrange generator change.                 Stevo Meyer,  MD  03/14/2023

## 2023-03-14 ENCOUNTER — OFFICE VISIT (OUTPATIENT)
Dept: CARDIOLOGY | Facility: CLINIC | Age: 88
End: 2023-03-14
Payer: MEDICARE

## 2023-03-14 VITALS
OXYGEN SATURATION: 98 % | HEIGHT: 74 IN | WEIGHT: 220 LBS | DIASTOLIC BLOOD PRESSURE: 60 MMHG | HEART RATE: 67 BPM | RESPIRATION RATE: 16 BRPM | SYSTOLIC BLOOD PRESSURE: 138 MMHG | BODY MASS INDEX: 28.23 KG/M2

## 2023-03-14 VITALS
HEIGHT: 74 IN | DIASTOLIC BLOOD PRESSURE: 60 MMHG | HEART RATE: 66 BPM | BODY MASS INDEX: 28.11 KG/M2 | RESPIRATION RATE: 16 BRPM | WEIGHT: 219 LBS | SYSTOLIC BLOOD PRESSURE: 136 MMHG

## 2023-03-14 DIAGNOSIS — I10 PRIMARY HYPERTENSION: Chronic | ICD-10-CM

## 2023-03-14 DIAGNOSIS — Z95.2 S/P TAVR (TRANSCATHETER AORTIC VALVE REPLACEMENT): Primary | Chronic | ICD-10-CM

## 2023-03-14 DIAGNOSIS — I48.20 CHRONIC ATRIAL FIBRILLATION (CMS/HCC): Primary | ICD-10-CM

## 2023-03-14 DIAGNOSIS — Z95.0 PACEMAKER: ICD-10-CM

## 2023-03-14 DIAGNOSIS — I48.20 CHRONIC ATRIAL FIBRILLATION (CMS/HCC): Chronic | ICD-10-CM

## 2023-03-14 DIAGNOSIS — Z95.1 S/P CABG (CORONARY ARTERY BYPASS GRAFT): Chronic | ICD-10-CM

## 2023-03-14 DIAGNOSIS — Z95.2 S/P TAVR (TRANSCATHETER AORTIC VALVE REPLACEMENT): Chronic | ICD-10-CM

## 2023-03-14 DIAGNOSIS — I25.10 CORONARY ARTERY DISEASE INVOLVING NATIVE CORONARY ARTERY OF NATIVE HEART WITHOUT ANGINA PECTORIS: ICD-10-CM

## 2023-03-14 PROCEDURE — 99215 OFFICE O/P EST HI 40 MIN: CPT | Performed by: INTERNAL MEDICINE

## 2023-03-14 PROCEDURE — 99214 OFFICE O/P EST MOD 30 MIN: CPT | Performed by: INTERNAL MEDICINE

## 2023-03-14 PROCEDURE — 93000 ELECTROCARDIOGRAM COMPLETE: CPT | Performed by: INTERNAL MEDICINE

## 2023-03-14 RX ORDER — OMEPRAZOLE 20 MG/1
20 CAPSULE, DELAYED RELEASE ORAL ONCE
COMMUNITY
Start: 2023-02-26

## 2023-03-14 ASSESSMENT — ENCOUNTER SYMPTOMS
ORTHOPNEA: 0
BRUISES/BLEEDS EASILY: 0
BLURRED VISION: 0
HEARTBURN: 0
WEAKNESS: 0
WEIGHT LOSS: 0
ENDOCRINE NEGATIVE: 1
SYNCOPE: 1
PND: 0
CHANGE IN BOWEL HABIT: 0
LIGHT-HEADEDNESS: 0
DIZZINESS: 0
HEMATURIA: 0
PSYCHIATRIC NEGATIVE: 1
DECREASED APPETITE: 0
WEIGHT GAIN: 0
FEVER: 0
COUGH: 0
ALLERGIC/IMMUNOLOGIC NEGATIVE: 1
NEAR-SYNCOPE: 1
SLEEP DISTURBANCES DUE TO BREATHING: 0
BACK PAIN: 1
BLOATING: 0
FLANK PAIN: 0

## 2023-03-14 NOTE — PROGRESS NOTES
Cardiology Note       Reason for visit: Scheduled cardiology follow-up    Chief Complaint   Patient presents with   • Follow-up   Permanent pacemaker  Obstructive sleep apnea  Systemic hypertension  Coronary artery disease status post coronary bypass graft surgery.  Severe symptomatic aortic stenosis status post transcatheter aortic valve replacement 2019     HPI   Cain Pruitt is a 89 y.o. male who presents for cardiac follow-up.  He is accompanied by family.    Back in the early part of February, he had a near syncopal episode during rehab.  He was transferred to a local hospital.  This episode was associated with some black bowel movements and there was a concern for gastrointestinal blood loss.  He underwent upper endoscopy which did not show any active bleeding.    No cardiac evaluation was done at the time of that hospitalization.  Since then he has had formal follow-up of his pacemaker which demonstrates appropriate function and end of battery life in approximately 6 months.  At the request of the family member blood cultures were also obtained and to this point have been negative for infection.    The patient did have a COVID-19 positive infection which resolved without sequela.    He does report some decline in his exercise tolerance since his hospitalization.  He does not have angina.  He denies PND or orthopnea.  He denies palpitations.  No further black tarry bowel movements.  No neurologic symptoms.    At the recommendation of an intern at the admitting hospital [facility] he decreased his Eliquis to 2.5 mg twice daily.      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    • Colon polyps    • Coronary artery disease 1991    s/p  "cabg    • Decreased cardiac ejection fraction 1/24/2019   • Edema    • Erectile dysfunction    • GI (gastrointestinal bleed)    • GI (gastrointestinal bleed) 06/2016   • Heart block 1/24/2019   • History of percutaneous coronary intervention 8/12/2019    BMS to SVG to OM 2007   • History of transfusion 2011    during colon resection had \"nicked artery\"- required multiple unitos pf blood    • Hyperlipidemia    • Hypertension    • Hypotension    • Lumbar herniated disc    • SIENA (obstructive sleep apnea) 8/12/2019   • Osteomyelitis of toe of right foot (CMS/[facility]) 2016    s/p 2nd and 3rd toe osteomylitis    • Pacemaker 1/24/2019   • PAD (peripheral artery disease) (CMS/[facility]) 8/12/2019   • Peripheral neuropathy     tips of fingers  and feet    • S/P CABG (coronary artery bypass graft) 1991 1991 LIMA to LAD, ARTURO to RDA, SVG to OM   • 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    Transfemoral TAVR #29 Evolut Pro on 11-   • Sleep apnea    • Syncope     2008,2011   • Type 2 diabetes mellitus (CMS/HCC)     last HgBA1C was 7.2 in 7/2019 managed by PCP Dr Aguilar Phan   • Urinary retention     self cath's 3 times per day,    • Vitamin D deficiency      Past Surgical History:   Procedure Laterality Date   • AMPUTATION FOOT / TOE Right 2016    2nd and 3rd right foot from osteomylitis    • BOWEL RESECTION  2011   • CARDIAC PACEMAKER PLACEMENT  2013   • CARDIAC SURGERY     • CARDIOVERSION  2008   • CORONARY ARTERY BYPASS GRAFT  1991    x 4 bypasses    • CORONARY STENT PLACEMENT      3 stents   • KNEE SURGERY Right 1951   • MOHS SURGERY     • SKIN BIOPSY       Social History     Socioeconomic History   • Marital status:      Spouse name: None   • Number of children: None   • Years of education: None   • Highest education level: None   Tobacco Use   • Smoking status: Never   • Smokeless tobacco: Never   Vaping Use   • Vaping status: Never Used   Substance and Sexual Activity   • Alcohol use: Yes     " Comment: occassionally/social   • Drug use: Defer   • Sexual activity: Defer     Family History   Problem Relation Age of Onset   • Clotting disorder Biological Mother    • Leukemia Biological Father      Patient has no known allergies.  Current Outpatient Medications   Medication Sig Dispense Refill   • apixaban (ELIQUIS) 5 mg tablet Take 5 mg by mouth 2 (two) times a day. To stop 2 days pre-op      • aspirin 81 mg enteric coated tablet Take 1 tablet (81 mg total) by mouth daily. 30 tablet 0   • bumetanide (BUMEX) 0.5 mg tablet Take 0.5 mg by mouth every morning.       • cholecalciferol, vitamin D3, 1,000 unit (25 mcg) tablet Take 1,000 Units by mouth daily.       • cyanocobalamin (VITAMIN B12) 1,000 mcg tablet Take 1,000 mcg by mouth daily.     • dutasteride (AVODART) 0.5 mg capsule Take 0.5 mg by mouth nightly.    0   • ezetimibe 10 mg tablet take 1 tablet by mouth NIGHTLY 90 tablet 3   • ferrous sulfate 325 mg (65 mg iron) tablet Take 65 mg by mouth every other day.       • ipratropium (ATROVENT) 0.03 % nasal spray Administer 0.03 % into affected nostril(s) as needed. 2 sprays by intranasal route 2-3 times every day in each nostril      • LACTOBACILLUS ACIDOPHILUS (PROBIOTIC ORAL) Take by mouth daily.     • lisinopriL (PRINIVIL) 10 mg tablet Take 1 tablet (10 mg total) by mouth daily. 90 tablet 3   • metFORMIN (GLUCOPHAGE) 500 mg tablet Take 500 mg by mouth daily with breakfast.       • metoprolol succinate XL (TOPROL-XL) 50 mg 24 hr tablet Take 50 mg by mouth nightly.       • omeprazole (PriLOSEC) 20 mg capsule Take 20 mg by mouth once.     • potassium chloride (MICRO-K) 10 mEq CR capsule Take 10 mEq by mouth every other day.       • silodosin (RAPAFLO) 8 mg capsule Take 8 mg by mouth nightly.         No current facility-administered medications for this visit.       Review of Systems   Constitutional: Negative for decreased appetite, fever, weight gain and weight loss.   HENT: Negative for congestion and  nosebleeds.    Eyes: Negative for blurred vision and visual disturbance.   Cardiovascular: Positive for near-syncope and syncope. Negative for chest pain, leg swelling, orthopnea and paroxysmal nocturnal dyspnea.   Respiratory: Negative for cough and sleep disturbances due to breathing.    Endocrine: Negative.    Hematologic/Lymphatic: Negative for bleeding problem. Does not bruise/bleed easily.   Musculoskeletal: Positive for back pain. Negative for arthritis and joint pain.   Gastrointestinal: Positive for melena. Negative for bloating, change in bowel habit and heartburn.   Genitourinary: Negative for flank pain and hematuria.   Neurological: Negative for dizziness, light-headedness and weakness.   Psychiatric/Behavioral: Negative.    Allergic/Immunologic: Negative.      Objective      Blood pressure 136/66 seated position left arm.  Resting room air saturation 98% with a respiratory rate of 16/min.  Pulse 70 bpm and regular.      Physical Exam:    General Appearance:  Alert, no distress.  In a wheelchair.   Head:  Normocephalic, without obvious abnormality, atraumatic   Eyes:  Conjunctiva/corneas clear, EOM's intact   Neck: No thyroid enlargement. No JVD. No bruits    Lungs:   Clear to auscultation bilaterally, respirations unlabored, no rales, no wheezing   Heart:  Regular rhythm, S1 and S2 normal,  Systolic ejection murmur, rub or gallop.  Single extrasystole heard.  No obvious diastolic murmur heard at rest or provoked with maneuver.   Abdomen:   Soft, non-tender, no masses, no organomegaly   Vascular: Pulses 2+ and symmetric all extremities, no carotid bruit or jugular vein distention   Musculoskeletal:  Skin: No injury or deformity  Skin color, texture, turgor normal, no rashes or lesions, no cyanosis or edema   Extremities: Extremities normal, atraumatic, pulses normal   Behavior/Emotional:  Neurologic: Appropriate, cooperative  No gross neurologic abnormalities.       Lab Results   Component Value Date     WBC 9.66 11/28/2019    HGB 12.6 (L) 11/28/2019     11/28/2019    CHOL 183 12/20/2017    TRIG 248 (H) 12/20/2017    HDL 42 (L) 12/20/2017    ALT 24 11/13/2019    AST 20 11/13/2019     11/28/2019    K 4.4 11/28/2019    CREATININE 1.2 11/28/2019    TSH 2.00 07/23/2016    INR 1.3 11/27/2019             ASSESSMENT/PLAN    SIENA (obstructive sleep apnea)  He does not use CPAP.     Atrial fibrillation (CMS/[facility])/Heart block/status post permanent pacemaker.  He follows up with Dr. Meyer.  Pacemaker interrogation today reveals battery life and within 6 months.  He has no symptoms of palpitations.  I asked family to confirm with Dr. Meyer the appropriateness of decreasing the Eliquis dose.    S/P TAVR (transcatheter aortic valve replacement)  Transfemoral TAVR #29 Evolut Pro on 11-.  No exam evidence of volume overload.  No exam evidence of valvular insufficiency.  At the family request and as part of evaluation of his recent syncopal episode I have suggested that he undergo a repeat echo.  We will contact him with that information.    Coronary artery disease involving native coronary artery without angina pectoris/S/P CABG (coronary artery bypass graft)/CHF (congestive heart failure), NYHA class III, acute on chronic, combined (CMS/[facility])  1991 LIMA to LAD, ARTURO to RDA, SVG to OM. He has no obvious angina or heart failure.  He will remain on his current medications.       Hypertension  Under fair control today on his current medications.  Sodium limitation in his diet was emphasized.     Type 2 diabetes mellitus (CMS/[facility])  He is on oral therapy and follows up with his primary care physician regarding his diabetes.     Hyperlipidemia  He is on lipid-lowering therapy which he tolerates.  Laboratory studies were drawn on February 25, 2021.  Total cholesterol 170, triglycerides 196, HDL 40 with an LDL of 91.    No obvious changes in cardiovascular exam.  No recommended changes to medications from my  perspective today.  I will contact him with the information from the echo and timing of the follow-up visit will be based on that information.     Efe Mejia MD  3/14/2023

## 2023-03-14 NOTE — LETTER
March 14, 2023     Aguilar Phan MD  [address]  [facility] [address]    Patient: Cain Pruitt  YOB: 1932  Date of Visit: 3/14/2023      Dear Dr. Phan:    Thank you for referring Cain Pruitt to me for evaluation. Below are my notes for this consultation.    If you have questions, please do not hesitate to call me. I look forward to following your patient along with you.         Sincerely,        Efe Mejia MD        CC: MD Stevo Deal MD William C Welch, MD Coady, Paul M, MD  3/14/2023  1:49 PM  Signed     Cardiology Note       Reason for visit: Scheduled cardiology follow-up    Chief Complaint   Patient presents with   • Follow-up   Permanent pacemaker  Obstructive sleep apnea  Systemic hypertension  Coronary artery disease status post coronary bypass graft surgery.  Severe symptomatic aortic stenosis status post transcatheter aortic valve replacement 2019     HPI   Cain Pruitt is a 89 y.o. male who presents for cardiac follow-up.  He is accompanied by family.    Back in the early part of February, he had a near syncopal episode during rehab.  He was transferred to a local hospital.  This episode was associated with some black bowel movements and there was a concern for gastrointestinal blood loss.  He underwent upper endoscopy which did not show any active bleeding.    No cardiac evaluation was done at the time of that hospitalization.  Since then he has had formal follow-up of his pacemaker which demonstrates appropriate function and end of battery life in approximately 6 months.  At the request of the family member blood cultures were also obtained and to this point have been negative for infection.    The patient did have a COVID-19 positive infection which resolved without sequela.    He does report some decline in his exercise tolerance since his hospitalization.  He does not have angina.  He denies PND or orthopnea.  He denies  "palpitations.  No further black tarry bowel movements.  No neurologic symptoms.    At the recommendation of an intern at the admitting hospital [facility] he decreased his Eliquis to 2.5 mg twice daily.      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    • Colon polyps    • Coronary artery disease 1991    s/p cabg    • Decreased cardiac ejection fraction 1/24/2019   • Edema    • Erectile dysfunction    • GI (gastrointestinal bleed)    • GI (gastrointestinal bleed) 06/2016   • Heart block 1/24/2019   • History of percutaneous coronary intervention 8/12/2019    BMS to SVG to OM 2007   • History of transfusion 2011    during colon resection had \"nicked artery\"- required multiple unitos pf blood    • Hyperlipidemia    • Hypertension    • Hypotension    • Lumbar herniated disc    • SIENA (obstructive sleep apnea) 8/12/2019   • Osteomyelitis of toe of right foot (CMS/[facility]) 2016    s/p 2nd and 3rd toe osteomylitis    • Pacemaker 1/24/2019   • PAD (peripheral artery disease) (CMS/[facility]) 8/12/2019   • Peripheral neuropathy     tips of fingers  and feet    • S/P CABG (coronary artery bypass graft) 1991 1991 LIMA to LAD, ARTURO to RDA, SVG to OM   • 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    Transfemoral TAVR #29 Evolut Pro on 11-   • Sleep apnea    • Syncope     2008,2011   • Type 2 diabetes mellitus (CMS/[facility])     last HgBA1C was 7.2 in 7/2019 managed by PCP Dr Aguilar Phan   • Urinary retention     self cath's 3 times per day,    • Vitamin D deficiency      Past Surgical History:   Procedure Laterality Date   • AMPUTATION FOOT / TOE Right 2016    2nd and 3rd right foot from osteomylitis    • BOWEL RESECTION  2011   • CARDIAC " PACEMAKER PLACEMENT  2013   • CARDIAC SURGERY     • CARDIOVERSION  2008   • CORONARY ARTERY BYPASS GRAFT  1991    x 4 bypasses    • CORONARY STENT PLACEMENT      3 stents   • KNEE SURGERY Right 1951   • MOHS SURGERY     • SKIN BIOPSY       Social History     Socioeconomic History   • Marital status:      Spouse name: None   • Number of children: None   • Years of education: None   • Highest education level: None   Tobacco Use   • Smoking status: Never   • Smokeless tobacco: Never   Vaping Use   • Vaping status: Never Used   Substance and Sexual Activity   • Alcohol use: Yes     Comment: occassionally/social   • Drug use: Defer   • Sexual activity: Defer     Family History   Problem Relation Age of Onset   • Clotting disorder Biological Mother    • Leukemia Biological Father      Patient has no known allergies.  Current Outpatient Medications   Medication Sig Dispense Refill   • apixaban (ELIQUIS) 5 mg tablet Take 5 mg by mouth 2 (two) times a day. To stop 2 days pre-op      • aspirin 81 mg enteric coated tablet Take 1 tablet (81 mg total) by mouth daily. 30 tablet 0   • bumetanide (BUMEX) 0.5 mg tablet Take 0.5 mg by mouth every morning.       • cholecalciferol, vitamin D3, 1,000 unit (25 mcg) tablet Take 1,000 Units by mouth daily.       • cyanocobalamin (VITAMIN B12) 1,000 mcg tablet Take 1,000 mcg by mouth daily.     • dutasteride (AVODART) 0.5 mg capsule Take 0.5 mg by mouth nightly.    0   • ezetimibe 10 mg tablet take 1 tablet by mouth NIGHTLY 90 tablet 3   • ferrous sulfate 325 mg (65 mg iron) tablet Take 65 mg by mouth every other day.       • ipratropium (ATROVENT) 0.03 % nasal spray Administer 0.03 % into affected nostril(s) as needed. 2 sprays by intranasal route 2-3 times every day in each nostril      • LACTOBACILLUS ACIDOPHILUS (PROBIOTIC ORAL) Take by mouth daily.     • lisinopriL (PRINIVIL) 10 mg tablet Take 1 tablet (10 mg total) by mouth daily. 90 tablet 3   • metFORMIN (GLUCOPHAGE) 500 mg  tablet Take 500 mg by mouth daily with breakfast.       • metoprolol succinate XL (TOPROL-XL) 50 mg 24 hr tablet Take 50 mg by mouth nightly.       • omeprazole (PriLOSEC) 20 mg capsule Take 20 mg by mouth once.     • potassium chloride (MICRO-K) 10 mEq CR capsule Take 10 mEq by mouth every other day.       • silodosin (RAPAFLO) 8 mg capsule Take 8 mg by mouth nightly.         No current facility-administered medications for this visit.       Review of Systems   Constitutional: Negative for decreased appetite, fever, weight gain and weight loss.   HENT: Negative for congestion and nosebleeds.    Eyes: Negative for blurred vision and visual disturbance.   Cardiovascular: Positive for near-syncope and syncope. Negative for chest pain, leg swelling, orthopnea and paroxysmal nocturnal dyspnea.   Respiratory: Negative for cough and sleep disturbances due to breathing.    Endocrine: Negative.    Hematologic/Lymphatic: Negative for bleeding problem. Does not bruise/bleed easily.   Musculoskeletal: Positive for back pain. Negative for arthritis and joint pain.   Gastrointestinal: Positive for melena. Negative for bloating, change in bowel habit and heartburn.   Genitourinary: Negative for flank pain and hematuria.   Neurological: Negative for dizziness, light-headedness and weakness.   Psychiatric/Behavioral: Negative.    Allergic/Immunologic: Negative.      Objective      Blood pressure 136/66 seated position left arm.  Resting room air saturation 98% with a respiratory rate of 16/min.  Pulse 70 bpm and regular.      Physical Exam:    General Appearance:  Alert, no distress.  In a wheelchair.   Head:  Normocephalic, without obvious abnormality, atraumatic   Eyes:  Conjunctiva/corneas clear, EOM's intact   Neck: No thyroid enlargement. No JVD. No bruits    Lungs:   Clear to auscultation bilaterally, respirations unlabored, no rales, no wheezing   Heart:  Regular rhythm, S1 and S2 normal,  Systolic ejection murmur, rub or  gallop.  Single extrasystole heard.  No obvious diastolic murmur heard at rest or provoked with maneuver.   Abdomen:   Soft, non-tender, no masses, no organomegaly   Vascular: Pulses 2+ and symmetric all extremities, no carotid bruit or jugular vein distention   Musculoskeletal:  Skin: No injury or deformity  Skin color, texture, turgor normal, no rashes or lesions, no cyanosis or edema   Extremities: Extremities normal, atraumatic, pulses normal   Behavior/Emotional:  Neurologic: Appropriate, cooperative  No gross neurologic abnormalities.       Lab Results   Component Value Date    WBC 9.66 11/28/2019    HGB 12.6 (L) 11/28/2019     11/28/2019    CHOL 183 12/20/2017    TRIG 248 (H) 12/20/2017    HDL 42 (L) 12/20/2017    ALT 24 11/13/2019    AST 20 11/13/2019     11/28/2019    K 4.4 11/28/2019    CREATININE 1.2 11/28/2019    TSH 2.00 07/23/2016    INR 1.3 11/27/2019             ASSESSMENT/PLAN    SIENA (obstructive sleep apnea)  He does not use CPAP.     Atrial fibrillation (CMS/[facility])/Heart block/status post permanent pacemaker.  He follows up with Dr. Meyer.  Pacemaker interrogation today reveals battery life and within 6 months.  He has no symptoms of palpitations.  I asked family to confirm with Dr. Meyer the appropriateness of decreasing the Eliquis dose.    S/P TAVR (transcatheter aortic valve replacement)  Transfemoral TAVR #29 Evolut Pro on 11-.  No exam evidence of volume overload.  No exam evidence of valvular insufficiency.  At the family request and as part of evaluation of his recent syncopal episode I have suggested that he undergo a repeat echo.  We will contact him with that information.    Coronary artery disease involving native coronary artery without angina pectoris/S/P CABG (coronary artery bypass graft)/CHF (congestive heart failure), NYHA class III, acute on chronic, combined (CMS/[facility])  1991 LIMA to LAD, ARTURO to RDA, SVG to OM. He has no obvious angina or heart failure.  He  will remain on his current medications.       Hypertension  Under fair control today on his current medications.  Sodium limitation in his diet was emphasized.     Type 2 diabetes mellitus (CMS/[facility])  He is on oral therapy and follows up with his primary care physician regarding his diabetes.     Hyperlipidemia  He is on lipid-lowering therapy which he tolerates.  Laboratory studies were drawn on February 25, 2021.  Total cholesterol 170, triglycerides 196, HDL 40 with an LDL of 91.    No obvious changes in cardiovascular exam.  No recommended changes to medications from my perspective today.  I will contact him with the information from the echo and timing of the follow-up visit will be based on that information.     Efe Mejia MD  3/14/2023

## 2023-03-14 NOTE — ASSESSMENT & PLAN NOTE
He has a [employer]roni[employer]-Septic single-chamber pacemaker that was implanted on 9/16/2013.  Battery longevity is 6 months until MARTÍN.  Mode is VVI-CLS with a base rate of 60.  The ventricular lead has a measured R wave of 21.4 mV, and a pacing threshold of 1.0 V at 1.0 ms, and a lead impedance of 565 ohms.  There is ventricular pacing 91% of the time.  There were no changes made to the permanent programming of this device.    He is aware that he will need generator change in approximately 6 months.  He is enrolled in remote monitoring.  He will follow-up in 6 months and at that time we will arrange generator change.

## 2023-03-14 NOTE — ASSESSMENT & PLAN NOTE
He has chronic asymptomatic atrial fibrillation.  He is anticoagulated on Eliquis 5 mg twice daily and is tolerating it well without signs or symptoms of bleeding.

## 2023-03-16 ENCOUNTER — TELEPHONE (OUTPATIENT)
Dept: CARDIOLOGY | Facility: CLINIC | Age: 88
End: 2023-03-16
Payer: MEDICARE

## 2023-03-16 ENCOUNTER — HOSPITAL ENCOUNTER (OUTPATIENT)
Dept: CARDIOLOGY | Facility: CLINIC | Age: 88
Discharge: HOME | End: 2023-03-16
Attending: INTERNAL MEDICINE
Payer: MEDICARE

## 2023-03-16 VITALS
SYSTOLIC BLOOD PRESSURE: 119 MMHG | WEIGHT: 220 LBS | BODY MASS INDEX: 28.23 KG/M2 | DIASTOLIC BLOOD PRESSURE: 60 MMHG | HEIGHT: 74 IN

## 2023-03-16 DIAGNOSIS — Z95.2 S/P TAVR (TRANSCATHETER AORTIC VALVE REPLACEMENT): Chronic | ICD-10-CM

## 2023-03-16 DIAGNOSIS — I48.20 CHRONIC ATRIAL FIBRILLATION (CMS/HCC): Chronic | ICD-10-CM

## 2023-03-16 DIAGNOSIS — Z95.1 S/P CABG (CORONARY ARTERY BYPASS GRAFT): Chronic | ICD-10-CM

## 2023-03-16 DIAGNOSIS — I10 PRIMARY HYPERTENSION: Chronic | ICD-10-CM

## 2023-03-16 LAB
AORTIC ROOT ANNULUS: 3.8 CM
AORTIC VALVE MEAN VELOCITY: 1.29 M/S
AORTIC VALVE VELOCITY TIME INTEGRAL: 43.1 CM
ASCENDING AORTA: 4.3 CM
AV MEAN GRADIENT: 8 MMHG
AV PEAK GRADIENT: 14 MMHG
AV PEAK VELOCITY-S: 1.88 M/S
AV REG PEAK VEL: 4.28 M/S
AV REGURGITATION PRESSURE HALF TIME: 415 MS
AV VALVE AREA INDEX: 0.66
AV VALVE AREA: 1.31 CM2
AV VELOCITY RATIO: 0.48
AVA (VTI): 1.5 CM2
BSA FOR ECHO PROCEDURE: 2.28 M2
DOP CALC LVOT STROKE VOLUME: 64.68 CM3
DOP CALC RVOT AREA: 4.52 CM2
DOP CALC RVOT STROKE VOLUME: 64.21 CM3
E WAVE DECELERATION TIME: 248 MS
E/A RATIO: 3.7
E/E' RATIO: 19.8
E/LAT E' RATIO: 14.5
EDV (BP): 90.1 CM3
EF (A4C): 56.6 %
EF A2C: 47.1 %
EJECTION FRACTION: 52.7 %
EST RIGHT VENT SYSTOLIC PRESSURE BY TRICUSPID REGURGITATION JET: 37 MMHG
ESV (BP): 42.6 CM3
FRACTIONAL SHORTENING: 28.18 %
INTERVENTRICULAR SEPTUM: 1.02 CM
LA ESV (BP): 107 CM3
LA ESV INDEX (A2C): 50.44 CM3/M2
LA ESV INDEX (BP): 46.93 CM3/M2
LA/AORTA RATIO: 1.34
LAAS-AP2: 30.9 CM2
LAAS-AP4: 28.8 CM2
LAD 2D: 5.1 CM
LALD A4C: 6.86 CM
LALD A4C: 6.88 CM
LAV-S: 115 CM3
LEFT ATRIUM VOLUME INDEX: 44.3 CM3/M2
LEFT ATRIUM VOLUME: 101 CM3
LEFT INTERNAL DIMENSION IN SYSTOLE: 4.23 CM (ref 4.08–6.18)
LEFT VENTRICLE DIASTOLIC VOLUME INDEX: 38.77 CM3/M2
LEFT VENTRICLE DIASTOLIC VOLUME: 88.4 CM3
LEFT VENTRICLE SYSTOLIC VOLUME INDEX: 16.84 CM3/M2
LEFT VENTRICLE SYSTOLIC VOLUME: 38.4 CM3
LEFT VENTRICULAR INTERNAL DIMENSION IN DIASTOLE: 5.89 CM (ref 7.03–9.77)
LEFT VENTRICULAR POSTERIOR WALL IN END DIASTOLE: 1.51 CM (ref 0.84–1.57)
LV DIASTOLIC VOLUME: 86.7 CM3
LV ESV (APICAL 2 CHAMBER): 45.8 CM3
LVAD-AP2: 29.1 CM2
LVAD-AP4: 28.9 CM2
LVAS-AP2: 19.6 CM2
LVAS-AP4: 18.1 CM2
LVEDVI(A2C): 38.03 CM3/M2
LVEDVI(BP): 39.52 CM3/M2
LVESVI(A2C): 20.09 CM3/M2
LVESVI(BP): 18.68 CM3/M2
LVLD-AP2: 8.13 CM
LVLD-AP4: 7.62 CM
LVLS-AP2: 7.62 CM
LVLS-AP4: 7.22 CM
LVOT 2D: 2 CM
LVOT A: 3.14 CM2
LVOT MG: 2 MMHG
LVOT MV: 0.7 M/S
LVOT PEAK VELOCITY: 1 M/S
LVOT PG: 4 MMHG
LVOT STROKE VOLUME INDEX: 28.37 ML/M2
LVOT VTI: 20.6 CM
MITRAL VALVE MEAN INFLOW VELOCITY: 3.9 M/S
MLH CV ECHO AVA INDEX VELOCITY RATIO: 0.6
MR VELOCITY: 5.19 M/S
MR VTI: 162 CM
MV E'TISSUE VEL-LAT: 0.08 M/S
MV E'TISSUE VEL-MED: 0.06 M/S
MV MEAN GRADIENT: 2 MMHG
MV PEAK A VEL: 0.29 M/S
MV PEAK E VEL: 1.09 M/S
MV PEAK GRADIENT: 4 MMHG
MV VALVE AREA BY CONTINUITY EQUATION: 2.72 CM2
MV VTI: 23.8 CM
POSTERIOR WALL: 1.51 CM
RAP: 5 MMHG
RVOT D: 2.4 CM
RVOT VMAX: 0.67 M/S
RVOT VTI: 14.2 CM
SEPTAL TISSUE DOPPLER FREE WALL LATE DIA VELOCITY (APICAL 4 CHAMBER VIEW): 0.09 M/S
TAPSE: 1.65 CM
TR MAX PG: 31.81 MMHG
TRICUSPID VALVE PEAK REGURGITATION VELOCITY: 2.82 M/S
Z-SCORE OF LEFT VENTRICULAR DIMENSION IN END DIASTOLE: -3.42
Z-SCORE OF LEFT VENTRICULAR DIMENSION IN END SYSTOLE: -1.35
Z-SCORE OF LEFT VENTRICULAR POSTERIOR WALL IN END DIASTOLE: 1.43

## 2023-03-16 PROCEDURE — 93306 TTE W/DOPPLER COMPLETE: CPT | Performed by: STUDENT IN AN ORGANIZED HEALTH CARE EDUCATION/TRAINING PROGRAM

## 2023-03-16 NOTE — TELEPHONE ENCOUNTER
----- Message from Efe Mejia MD sent at 3/16/2023 12:10 PM EDT -----  TP  Please call the patient with result.  There is a stability of his TAVR valve findings with no other significant change in his cardiac structure or function compared to a previous study.    Would recommend no change in medications.  He follow-up as scheduled in 6 months.    Thank you    pmc

## 2023-03-16 NOTE — TELEPHONE ENCOUNTER
I called the patient and he did not answer.  I left a message on his machine reviewing Dr. Mejia's message below.  Stable TAVR valve.  No change to medications and follow-up in 6 months.

## 2023-07-13 ENCOUNTER — TELEPHONE (OUTPATIENT)
Dept: CARDIOLOGY | Facility: CLINIC | Age: 88
End: 2023-07-13
Payer: MEDICARE

## 2023-07-13 DIAGNOSIS — I45.9 HEART BLOCK: Primary | Chronic | ICD-10-CM

## 2023-07-13 NOTE — TELEPHONE ENCOUNTER
RED ALERT:    Biotronik Pacemaker Report  Monitoring period: 7/5/23-7/12/23    Dr.Rothman Dr. Mejia    Battery/Lead Status: MARTÍN as of 7/12/23      Battery at MART[employer]N with rapid battery depletion, 15% on 7/5/23 with estimation at 21 months,  escalated to follow up tab on July 13th, 2023.         Full summary will be provided upon next billable report.    Addendum:  Mr. Pruitt has an appointment with Dr. Meyer on 9/19/23.   Pt. has a history of CAD and Chronic A-Fib.

## 2023-07-13 NOTE — TELEPHONE ENCOUNTER
DR. LAGOS-I spoke to patient's son and informed him PPM has reached MARTÍN on 07/12/23. Patient should schedule generator change. Son would prefer to wait until after 08/04/23 if that is okay with you. If not, he would like it done ASAP since he has an upcoming wedding to attend.     EP  Team-please reach out to patient to schedule PPM gen change (Biotronik). I will enter case request and labs. TY!

## 2023-08-08 ENCOUNTER — ANESTHESIA EVENT (OUTPATIENT)
Dept: CARDIOLOGY | Facility: HOSPITAL | Age: 88
Setting detail: HOSPITAL OUTPATIENT SURGERY
End: 2023-08-08
Payer: MEDICARE

## 2023-08-08 RX ORDER — IBUPROFEN 200 MG
16-32 TABLET ORAL AS NEEDED
Status: CANCELLED | OUTPATIENT
Start: 2023-08-08

## 2023-08-08 RX ORDER — DEXTROSE 40 %
15-30 GEL (GRAM) ORAL AS NEEDED
Status: CANCELLED | OUTPATIENT
Start: 2023-08-08

## 2023-08-08 RX ORDER — DEXTROSE 50 % IN WATER (D50W) INTRAVENOUS SYRINGE
25 AS NEEDED
Status: CANCELLED | OUTPATIENT
Start: 2023-08-08

## 2023-08-08 ASSESSMENT — ENCOUNTER SYMPTOMS: DYSRHYTHMIAS: 1

## 2023-08-08 NOTE — ANESTHESIA PREPROCEDURE EVALUATION
"Relevant Problems   CARDIOVASCULAR   (+) Atrial fibrillation (CMS/[facility])   (+) Coronary artery disease   (+) Coronary artery disease involving native coronary artery without angina pectoris   (+) Heart block   (+) Hypertension   (+) Nonrheumatic aortic valve stenosis   (+) Pacemaker      GASTROINTESTINAL   (+) GI (gastrointestinal bleed)      MUSCULOSKELETAL   (+) Arthritis      NEUROLOGY   (+) Peripheral neuropathy      RESPIRATORY SYSTEM   (+) SIENA (obstructive sleep apnea)      URINARY SYSTEM   (+) BPH (benign prostatic hyperplasia)      Other   (+) Osteomyelitis of toe of right foot (CMS/[facility])   (+) Type 2 diabetes mellitus (CMS/[facility])       Past Medical History:   Diagnosis Date   • A-fib (CMS/HCC)    • Arthritis     b/l knees   • BPH (benign prostatic hyperplasia)    • CAD (coronary artery disease)     4 stents placed since 1991   • Cancer of cecum (CMS/HCC) 2011    S/p resection 2011   • Cecum cancer (CMS/HCC)     12/2/2011 no chemo or XRT.    • Colon polyps    • Coronary artery disease 1991    s/p cabg    • Decreased cardiac ejection fraction 1/24/2019   • Edema    • Erectile dysfunction    • GI (gastrointestinal bleed)    • GI (gastrointestinal bleed) 06/2016   • Heart block 1/24/2019   • History of percutaneous coronary intervention 8/12/2019    BMS to SVG to OM 2007   • History of transfusion 2011    during colon resection had \"nicked artery\"- required multiple unitos pf blood    • Hyperlipidemia    • Hypertension    • Hypotension    • Lumbar herniated disc    • SIENA (obstructive sleep apnea) 8/12/2019   • Osteomyelitis of toe of right foot (CMS/HCC) 2016    s/p 2nd and 3rd toe osteomylitis    • Pacemaker 1/24/2019   • PAD (peripheral artery disease) (CMS/HCC) 8/12/2019   • Peripheral neuropathy     tips of fingers  and feet    • S/P CABG (coronary artery bypass graft) 1991 1991 LIMA to LAD, ARTURO to RDA, SVG to OM   • 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    Transfemoral TAVR #29 Evolut " Pro on 11-   • Sleep apnea    • Syncope     2008,2011   • Type 2 diabetes mellitus (CMS/[facility])     last HgBA1C was 7.2 in 7/2019 managed by PCP Dr Aguilar Phan   • Urinary retention     self cath's 3 times per day,    • Vitamin D deficiency        TTE 2023  Interpretation Summary       •  Left Ventricle: Normal ventricle size. Mild concentric left ventricular hypertrophy. Low normal systolic function. Estimated EF 50-55%. Abnormal septal motion consistent with right ventricular pacemaker. Unable to assess diastolic filling pattern.  •  Right Ventricle: Normal ventricle size. Low normal systolic function.  •  Left Atrium: Moderately dilated atrium.  •  Right Atrium: Moderately dilated atrium.  •  Aortic Valve: The patient is s/p TAVR #29 Evolut Pro on 11-. The valve is not well visualized, but in limited views appears grossly normal. There is mild to moderate transvalvular regurgitation. Mild to moderate regurgitation with a centrally directed jet. Peak velocity = 1.88 m/s. Mean gradient = 8.00 mmHg.  •  Mitral Valve: Moderate calcification of the posterior leaflet. Normal leaflet motion. Mild mitral annular calcification. Mild regurgitation. No stenosis.  •  Tricuspid Valve: Normal structure. Mild regurgitation. Estimated RVSP = 37 mmHg.  •  Pulmonic Valve: Grossly normal structure. Trace regurgitation. No stenosis.  •  Pericardium: No evidence of pericardial effusion.  •  IVC/SVC: Inferior vena cava is a small caliber vessel (<1.7cm). Inferior vena cava collapses >50% during inspiration.  •  Aorta: Sinuses of Valsalva normal-sized. Aortic arch normal-sized. Mild dilatation of the ascending aorta at 4.3 cm.  •  Prior Study: Prior study available for comparison. Prior study date: 09/07/2021. Changes noted compared to prior study.  The ejection fraction is in the low normal range on the current study.  Gradients across the TAVR valve are similar.  The ascending aorta is mildly dilated on the current  study.           Anesthesia ROS/MED HX      Pulmonary    Sleep apnea  Cardiovascular   Valvular problems/murmurs   CAD   hypertension  Dysrhythmias   CHF   Echocardiogram reviewed and ECG reviewed  Endo/Other   Diabetes       Past Surgical History:   Procedure Laterality Date   • AMPUTATION FOOT / TOE Right 2016    2nd and 3rd right foot from osteomylitis    • BOWEL RESECTION  2011   • CARDIAC PACEMAKER PLACEMENT  2013   • CARDIAC SURGERY     • CARDIOVERSION  2008   • CORONARY ARTERY BYPASS GRAFT  1991    x 4 bypasses    • CORONARY STENT PLACEMENT      3 stents   • KNEE SURGERY Right 1951   • MOHS SURGERY     • SKIN BIOPSY         Physical Exam    Airway   Mallampati: II   TM distance: >3 FB   Neck ROM: full  Cardiovascular - normal   Rhythm: regular   Rate: normalPulmonary - normal   clear to auscultation  Dental - normal        Anesthesia Plan    Plan: MAC    Technique: MAC     Lines and Monitors: PIV     Airway: natural airway / supplemental oxygen   3 ASA  Anesthetic plan and risks discussed with: patient  Induction:    intravenous   Postop Plan:   Patient Disposition: phase II then home   Pain Management: IV analgesics

## 2023-08-09 ENCOUNTER — HOSPITAL ENCOUNTER (OUTPATIENT)
Facility: HOSPITAL | Age: 88
Setting detail: HOSPITAL OUTPATIENT SURGERY
Discharge: HOME | End: 2023-08-09
Attending: INTERNAL MEDICINE | Admitting: INTERNAL MEDICINE
Payer: MEDICARE

## 2023-08-09 ENCOUNTER — ANESTHESIA (OUTPATIENT)
Dept: CARDIOLOGY | Facility: HOSPITAL | Age: 88
Setting detail: HOSPITAL OUTPATIENT SURGERY
End: 2023-08-09
Payer: MEDICARE

## 2023-08-09 VITALS
TEMPERATURE: 97.2 F | OXYGEN SATURATION: 98 % | HEIGHT: 74 IN | SYSTOLIC BLOOD PRESSURE: 159 MMHG | BODY MASS INDEX: 25.67 KG/M2 | HEART RATE: 68 BPM | WEIGHT: 200 LBS | RESPIRATION RATE: 12 BRPM | DIASTOLIC BLOOD PRESSURE: 72 MMHG

## 2023-08-09 DIAGNOSIS — I45.9 HEART BLOCK: ICD-10-CM

## 2023-08-09 LAB
GLUCOSE BLD-MCNC: 192 MG/DL (ref 70–99)
POCT TEST: ABNORMAL
POTASSIUM SERPL-SCNC: 4.4 MEQ/L (ref 3.5–5.1)

## 2023-08-09 PROCEDURE — 82948 REAGENT STRIP/BLOOD GLUCOSE: CPT | Performed by: INTERNAL MEDICINE

## 2023-08-09 PROCEDURE — 0JPT0PZ REMOVAL OF CARDIAC RHYTHM RELATED DEVICE FROM TRUNK SUBCUTANEOUS TISSUE AND FASCIA, OPEN APPROACH: ICD-10-PCS | Performed by: INTERNAL MEDICINE

## 2023-08-09 PROCEDURE — 36415 COLL VENOUS BLD VENIPUNCTURE: CPT | Performed by: STUDENT IN AN ORGANIZED HEALTH CARE EDUCATION/TRAINING PROGRAM

## 2023-08-09 PROCEDURE — 25800000 HC PHARMACY IV SOLUTIONS: Performed by: PHYSICIAN ASSISTANT

## 2023-08-09 PROCEDURE — C1786 PMKR, SINGLE, RATE-RESP: HCPCS | Performed by: INTERNAL MEDICINE

## 2023-08-09 PROCEDURE — 33227 REMOVE&REPLACE PM GEN SINGL: CPT | Performed by: INTERNAL MEDICINE

## 2023-08-09 PROCEDURE — 63600000 HC DRUGS/DETAIL CODE: Mod: JZ | Performed by: STUDENT IN AN ORGANIZED HEALTH CARE EDUCATION/TRAINING PROGRAM

## 2023-08-09 PROCEDURE — 93005 ELECTROCARDIOGRAM TRACING: CPT | Performed by: INTERNAL MEDICINE

## 2023-08-09 PROCEDURE — 37000002 HC ANESTHESIA MAC: Performed by: INTERNAL MEDICINE

## 2023-08-09 PROCEDURE — 0JH604Z INSERTION OF PACEMAKER, SINGLE CHAMBER INTO CHEST SUBCUTANEOUS TISSUE AND FASCIA, OPEN APPROACH: ICD-10-PCS | Performed by: INTERNAL MEDICINE

## 2023-08-09 PROCEDURE — 71000001 HC PACU PHASE 1 INITIAL 30MIN: Performed by: INTERNAL MEDICINE

## 2023-08-09 PROCEDURE — 71000011 HC PACU PHASE 1 EA ADDL MIN: Performed by: INTERNAL MEDICINE

## 2023-08-09 PROCEDURE — 25800000 HC PHARMACY IV SOLUTIONS: Performed by: STUDENT IN AN ORGANIZED HEALTH CARE EDUCATION/TRAINING PROGRAM

## 2023-08-09 PROCEDURE — 25000000 HC PHARMACY GENERAL: Performed by: INTERNAL MEDICINE

## 2023-08-09 PROCEDURE — 84132 ASSAY OF SERUM POTASSIUM: CPT | Performed by: STUDENT IN AN ORGANIZED HEALTH CARE EDUCATION/TRAINING PROGRAM

## 2023-08-09 PROCEDURE — 25000000 HC PHARMACY GENERAL: Performed by: STUDENT IN AN ORGANIZED HEALTH CARE EDUCATION/TRAINING PROGRAM

## 2023-08-09 PROCEDURE — 200200 PR NO CHARGE: Performed by: INTERNAL MEDICINE

## 2023-08-09 PROCEDURE — 63600000 HC DRUGS/DETAIL CODE: Performed by: PHYSICIAN ASSISTANT

## 2023-08-09 DEVICE — IMPLANTABLE DEVICE
Type: IMPLANTABLE DEVICE | Site: CHEST | Status: FUNCTIONAL
Brand: EDORA 8 SR-T

## 2023-08-09 RX ORDER — SODIUM CHLORIDE 9 MG/ML
INJECTION, SOLUTION INTRAVENOUS CONTINUOUS PRN
Status: DISCONTINUED | OUTPATIENT
Start: 2023-08-09 | End: 2023-08-09 | Stop reason: SURG

## 2023-08-09 RX ORDER — PROPOFOL 10 MG/ML
INJECTION, EMULSION INTRAVENOUS CONTINUOUS PRN
Status: DISCONTINUED | OUTPATIENT
Start: 2023-08-09 | End: 2023-08-09 | Stop reason: SURG

## 2023-08-09 RX ORDER — CEFAZOLIN SODIUM 2 G/100ML
INJECTION, SOLUTION INTRAVENOUS AS NEEDED
Status: DISCONTINUED | OUTPATIENT
Start: 2023-08-09 | End: 2023-08-09 | Stop reason: SURG

## 2023-08-09 RX ORDER — ACETAMINOPHEN 325 MG/1
650 TABLET ORAL EVERY 6 HOURS PRN
Status: DISCONTINUED | OUTPATIENT
Start: 2023-08-09 | End: 2023-08-09 | Stop reason: HOSPADM

## 2023-08-09 RX ORDER — FENTANYL CITRATE 50 UG/ML
INJECTION, SOLUTION INTRAMUSCULAR; INTRAVENOUS AS NEEDED
Status: DISCONTINUED | OUTPATIENT
Start: 2023-08-09 | End: 2023-08-09 | Stop reason: SURG

## 2023-08-09 RX ORDER — LIDOCAINE HYDROCHLORIDE 10 MG/ML
INJECTION, SOLUTION INFILTRATION; PERINEURAL
Status: DISCONTINUED | OUTPATIENT
Start: 2023-08-09 | End: 2023-08-09 | Stop reason: HOSPADM

## 2023-08-09 RX ADMIN — SODIUM CHLORIDE: 9 INJECTION, SOLUTION INTRAVENOUS at 07:34

## 2023-08-09 RX ADMIN — DEXMEDETOMIDINE HYDROCHLORIDE 0.4 MCG/KG/HR: 100 INJECTION, SOLUTION INTRAVENOUS at 07:44

## 2023-08-09 RX ADMIN — GENTAMICIN SULFATE 270 MG: 40 INJECTION, SOLUTION INTRAMUSCULAR; INTRAVENOUS at 06:28

## 2023-08-09 RX ADMIN — CEFAZOLIN SODIUM 2 G: 2 INJECTION, SOLUTION INTRAVENOUS at 07:44

## 2023-08-09 RX ADMIN — PROPOFOL 20 MCG/KG/MIN: 10 INJECTION, EMULSION INTRAVENOUS at 07:44

## 2023-08-09 RX ADMIN — FENTANYL CITRATE 50 MCG: 50 INJECTION, SOLUTION INTRAMUSCULAR; INTRAVENOUS at 07:44

## 2023-08-09 RX ADMIN — FENTANYL CITRATE 50 MCG: 50 INJECTION, SOLUTION INTRAMUSCULAR; INTRAVENOUS at 08:17

## 2023-08-09 ASSESSMENT — ENCOUNTER SYMPTOMS
MUSCULOSKELETAL NEGATIVE: 1
NEUROLOGICAL NEGATIVE: 1
RESPIRATORY NEGATIVE: 1
EYES NEGATIVE: 1
HEMATOLOGIC/LYMPHATIC NEGATIVE: 1
GASTROINTESTINAL NEGATIVE: 1
CONSTITUTIONAL NEGATIVE: 1
ENDOCRINE NEGATIVE: 1
PSYCHIATRIC NEGATIVE: 1
CARDIOVASCULAR NEGATIVE: 1

## 2023-08-09 NOTE — Clinical Note
Problem: Adult Inpatient Plan of Care  Goal: Plan of Care Review  Outcome: Ongoing (interventions implemented as appropriate)  Patient AAO x4. VSS..   Patient remained afebrile throughout the shift  Patient remained free of falls this shift.  Patient free of pain this shift.  Plan of care reviewed.  IV lasix BID  1000ml fluid restriction maintained   Strict I&Os   Patient verbalized understanding.  Patient up with assist, turning independently   Frequent weight shifting encouraged.  Patient a-fib  on monitor.  Bed low, siderails up x2, wheels locked, call light in reach.  Bed alarm maintained for safety.  Patient instructed to call for assistance.  Hourly rounding completed.  Will continue to monitor.       Universal procedure checklist completed. Airway assessment completed. Physician agrees with pre-sedation assessment..

## 2023-08-09 NOTE — Clinical Note
The PACEMAKER EDORA SR-T PROMRI W/ CLS  - [id number] - [id number] device was inserted. The leads were placed into the connector and visually verified to be in correct position. Injury current obtained.

## 2023-08-09 NOTE — ANESTHESIA POSTPROCEDURE EVALUATION
Patient: Cain Pruitt    Procedure Summary     Date: 08/09/23 Room / Location: [facility] EP LAB 5 / [facility] CARDIAC CATH/EP    Anesthesia Start: 0734 Anesthesia Stop: 0845    Procedure: PPM - SINGLE CHAMBER GENERATOR CHANGE (Left) Diagnosis:       Heart block      (AV block)    Providers: Stevo Meyer MD Responsible Provider: Adithya Martínez MD    Anesthesia Type: MAC ASA Status: 3          Anesthesia Type: MAC  PACU Vitals    No data found in the last 10 encounters.           Anesthesia Post Evaluation    Pain management: adequate  Patient participation: complete - patient participated  Level of consciousness: awake and alert  Cardiovascular status: acceptable  Airway Patency: adequate  Respiratory status: acceptable  Hydration status: acceptable  Anesthetic complications: no

## 2023-08-09 NOTE — Clinical Note
The bilateral chest was clipped, marked and prepped with ChloraPrep. The patient was draped in a sterile fashion after allowing for the recommended dry time.

## 2023-08-09 NOTE — ANESTHESIOLOGIST PRE-PROCEDURE ATTESTATION
Pre-Procedure Patient Identification:  I am the Primary Anesthesiologist and have identified the patient on 08/09/23 at 6:48 AM.   I have confirmed the procedure(s) will be performed by the following surgeon/proceduralist Stevo Meyer MD.

## 2023-08-09 NOTE — Clinical Note
Elise Antonio applied to patient's lower body @ a setting of medium - 38° C. Applied by: Jesus Fraga RN.

## 2023-08-09 NOTE — POST-PROCEDURE NOTE
100 meter ambulation completed. Patient tolerated food and drink, voided without difficulty prior to discharge. Discharge instructions and wound care instructions provided to patient and son. Pt verbalized understanding and all questions answered. All access removed.Patient discharged to main entrance with RN via wheelchair. Assisted into vehicle with son. All belongings with patient.

## 2023-08-09 NOTE — Clinical Note
Patient placed on procedure table in supine position with arms at side. Positioning devices: all pressure points padded, arm board under arms, heel pads in place, safety strap applied, wrist straps in place, donut foam under head and pillow under knees.

## 2023-08-09 NOTE — H&P
"    Electrophysiology  Admission History and Physical Examination         Admitting Diagnosis: Heart block [I45.9]    HPI  Moises Pruitt is a 89 y.o. gentleman accompanied by his son who presents for elective pacemaker generator change.    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    He was found to be hyperkalemic three weeks ago and his Potassium supplement was withheld by his PCP.  He has not experienced any hospitalization since seen last by Dr Meyer, and no new allergies or diagnoses.  He was started on Ozempic two Mondays ago with his second dose this past Monday.      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    • Colon polyps    • Coronary artery disease 1991    s/p cabg    • Decreased cardiac ejection fraction 1/24/2019   • Edema    • Erectile dysfunction    • GI (gastrointestinal bleed)    • GI (gastrointestinal bleed) 06/2016   • Heart block 1/24/2019   • History of percutaneous coronary intervention 8/12/2019    BMS to SVG to OM 2007   • History of transfusion 2011    during colon resection had \"nicked artery\"- required multiple unitos pf blood    • Hyperlipidemia    • Hypertension    • Hypotension    • Lumbar herniated disc    • SIENA (obstructive sleep apnea) 8/12/2019   • Osteomyelitis of toe of right foot (CMS/[facility]) 2016    s/p 2nd and 3rd toe osteomylitis    • Pacemaker 1/24/2019   • PAD (peripheral artery disease) (CMS/[facility]) 8/12/2019   • Peripheral neuropathy     tips of " fingers  and feet    • S/P CABG (coronary artery bypass graft) 1991 1991 LIMA to LAD, ARTURO to RDA, SVG to OM   • 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    Transfemoral TAVR #29 Evolut Pro on 11-   • Sleep apnea    • Syncope     2008,2011   • Type 2 diabetes mellitus (CMS/HCC)     last HgBA1C was 7.2 in 7/2019 managed by PCP Dr Aguilar Phan   • Urinary retention     self cath's 3 times per day,    • Vitamin D deficiency      Surgical History:   Past Surgical History:   Procedure Laterality Date   • AMPUTATION FOOT / TOE Right 2016    2nd and 3rd right foot from osteomylitis    • BOWEL RESECTION  2011   • CARDIAC PACEMAKER PLACEMENT  2013   • CARDIAC SURGERY     • CARDIOVERSION  2008   • CORONARY ARTERY BYPASS GRAFT  1991    x 4 bypasses    • CORONARY STENT PLACEMENT      3 stents   • KNEE SURGERY Right 1951   • MOHS SURGERY     • SKIN BIOPSY       Allergies: Patient has no known allergies.    • ceFAZolin  2 g intravenous Once   • gentamicin  3 mg/kg (Order-Specific) intravenous Once     Social History:   Social History     Socioeconomic History   • Marital status:    Tobacco Use   • Smoking status: Never   • Smokeless tobacco: Never   Vaping Use   • Vaping Use: Never used   Substance and Sexual Activity   • Alcohol use: Yes     Comment: occassionally/social   • Drug use: Defer   • Sexual activity: Defer     Family History:   Family History   Problem Relation Age of Onset   • Clotting disorder Biological Mother    • Leukemia Biological Father      Review of Systems   Constitutional: Negative.   HENT: Negative.    Eyes: Negative.    Cardiovascular: Negative.    Respiratory: Negative.    Endocrine: Negative.    Hematologic/Lymphatic: Negative.    Skin: Negative.    Musculoskeletal: Negative.    Gastrointestinal: Negative.    Genitourinary: Negative.    Neurological: Negative.    Psychiatric/Behavioral: Negative.      Objective   Vital Signs for the last 24 hours:  Temp:  [36.2 °C  (97.2 °F)] 36.2 °C (97.2 °F)  Heart Rate:  [50] 50  Resp:  [6] 6  BP: (182)/(78) 182/78    Physical Exam  Vitals reviewed.   Eyes:      Pupils: Pupils are equal, round, and reactive to light.   Cardiovascular:      Rate and Rhythm: Normal rate and regular rhythm.      Heart sounds: Murmur heard.   Pulmonary:      Effort: Pulmonary effort is normal.      Breath sounds: Normal breath sounds.   Abdominal:      General: Bowel sounds are normal.      Palpations: Abdomen is soft.   Musculoskeletal:         General: Normal range of motion.      Cervical back: Normal range of motion.   Skin:     General: Skin is warm and dry.      Capillary Refill: Capillary refill takes less than 2 seconds.   Neurological:      General: No focal deficit present.      Mental Status: He is alert and oriented to person, place, and time.   Psychiatric:         Mood and Affect: Mood normal.         Behavior: Behavior normal.       Labs  CBC Results       11/28/19 11/27/19 11/13/19     0601 1848 1214    WBC 9.66 8.50 8.67    RBC 3.96 3.70 4.45    HGB 12.6 11.9 14.2    HCT 37.4 34.8 42.4    MCV 94.4 94.1 95.3    MCH 31.8 32.2 31.9    MCHC 33.7 34.2 33.5     159 188        CMP Results       11/28/19 11/27/19 11/13/19     0602 1848 1214     137 137    K 4.4 4.4 5.1    Cl 104 107 102    CO2 22 21 25    Glucose 132 132 141    BUN 20 23 29    Creatinine 1.2 1.2 1.5    Calcium 8.7 8.4 9.4    Anion Gap 10 9 10    AST -- -- 20    ALT -- -- 24    Albumin -- -- 3.8    EGFR 57.4 57.4 44.4        Troponin I Results    No lab values to display.     [unfilled]    Imaging  TRANSTHORACIC ECHO (TTE) COMPLETE 03/16/2023  Interpretation Summary  •  Left Ventricle: Normal ventricle size. Mild concentric left ventricular hypertrophy. Low normal systolic function. Estimated EF 50-55%. Abnormal septal motion consistent with right ventricular pacemaker. Unable to assess diastolic filling pattern.  •  Right Ventricle: Normal ventricle size. Low normal  systolic function.  •  Left Atrium: Moderately dilated atrium.  •  Right Atrium: Moderately dilated atrium.  •  Aortic Valve: The patient is s/p TAVR #29 Evolut Pro on 11-. The valve is not well visualized, but in limited views appears grossly normal. There is mild to moderate transvalvular regurgitation. Mild to moderate regurgitation with a centrally directed jet. Peak velocity = 1.88 m/s. Mean gradient = 8.00 mmHg.  •  Mitral Valve: Moderate calcification of the posterior leaflet. Normal leaflet motion. Mild mitral annular calcification. Mild regurgitation. No stenosis.  •  Tricuspid Valve: Normal structure. Mild regurgitation. Estimated RVSP = 37 mmHg.  •  Pulmonic Valve: Grossly normal structure. Trace regurgitation. No stenosis.  •  Pericardium: No evidence of pericardial effusion.  •  IVC/SVC: Inferior vena cava is a small caliber vessel (<1.7cm). Inferior vena cava collapses >50% during inspiration.  •  Aorta: Sinuses of Valsalva normal-sized. Aortic arch normal-sized. Mild dilatation of the ascending aorta at 4.3 cm.  •  Prior Study: Prior study available for comparison. Prior study date: 09/07/2021. Changes noted compared to prior study.  The ejection fraction is in the low normal range on the current study.  Gradients across the TAVR valve are similar.  The ascending aorta is mildly dilated on the current study.     CATH LAB:  Cath - 11/1/2007     ECHO/MUGA:  Echo - 7/15/2010  Echo (Scanned Transthoracic Echo. EF 50%.) - 11/1/2016  Echo (scanned Transthoracic Echocardiography Report EF 45 %) - 12/20/2017.Technically difficult and suboptimal echocardiogram secondary to patient characteristics.Mild concentric left ventricular hypertrophy with normal cavity size and mildly decreased systolic function. Ejection fraction 45%.Unable to evaluate for wall motion abnormalities given difficulty visualizing endocardial borders.Severe aortic stenosis with peak velocity of 4.1 m/s (Peak instantaneous gradient  of 66mmHg) and mean gradient of 34 mmHg.Calculated aortic valve area 0.77cm2. Mild aortic insufficiency.Mild mitral regurgitation. Moderately dilated left atrium.Moderately dilated right ventricular size with preserved systolic function. Mild tricuspid regurgitation with estimated right ventricular systolic pressure of 46 mmHg. Compared to the prior study from 11/2/16, left ventricular function has mildly decreased frrom 50% to 45%. Aortic valve peak gradient has increased from 54mmHg to 66mHg and mean gradient has increased from 24mmHg to 34 mmHg.    Echo 4/24/2019 Critical calcific aortic valve stenosis, aortic valve area 0.66 cm².  • Mildly dilated left ventricular cavity. Moderate concentric left ventricular hypertrophy. Mildly decreased systolic function. Estimated EF = 40- 45%. Diffuse hypokinesis.  • Normal-sized right ventricular cavity with preserved systolic function.  • Mildly dilated right atrium.  • Moderately dilated left atrium.  • Severe mitral valve calcification of the posterior leaflet. Sclerotic mitral valve. Mild mitral valve regurgitation.  • Mild tricuspid valve regurgitation with RVSP of 63 mmHg.  • Aortic root calcificied. The sinuses of Valsalva calcified. Effacement of the sinotubular junction.  • IVC not well visualized.  Normal pericardium. No evidence of pericardial effusion.     Echocardiogram 9/7/2021  1.  Technically adequate study.  2.  The rhythm appears to be atrial fibrillation with electronic ventricular pacing.  3.  Mildly dilated left ventricle with overall mildly diminished left ventricular systolic function.  Ejection fraction estimated at 40 to 45%.  Abnormal wall motion secondary to a paced rhythm.  4.  Status post transcatheter heart valve with a #29 Medtronic device in the aortic position.  No significant aortic stenosis.  Mild paravalvular aortic insufficiency.  Estimated aortic valve area 1.7 to 1.8 cm².  5.  Moderately dilated left atrium with mild mitral annular  calcification and trace to mild mitral regurgitation.  6.  Trace pulmonic insufficiency.  7.  Normal left ventricular cavity size with normal wall thickness and preserved systolic function.  Pacemaker leads are present in the right ventricle and right atrium.  8.  Trace to mild tricuspid regurgitation.  9.  Compared to a study of December 17, 2019, no significant change.     ELECTROPHYSIOLOGY:  Devices (Single Chamber PPM (Biotronik-Evia SRT), serial # [id number], ventricular lead(9/16/13)biotronik S60) -9/16/2013     STRESS TESTS:  SEH - 5/20/2009     VASCULAR:  Carotid Duplex (scanned Vascular Carotid) - 12/22/2014     CT/MRI:  Abd/Pelvis CT - 2/7/2012     OTHER:  CXR - 12/3/2011  Other (ABDOMINAL AORTIC US) - 1/20/2011  Other (CAROTID US) - 1/20/2011     TAVR 11/27/2019  s/p successful TF TAVR with 29 mm Evolut Pro    ECG   Atrial Fibrillation, intermittent intrinsic conduction, VVI Pacing     ASSESSMENT AND PLAN  Permanent A Fib  AV Block  S/p TAVR  Chart/records reviewed, patient interviewed and examined.  Procedure technique re-reviewed with additional questions answered to his, and his son's verbal understanding.  Risks (vascular injury and bleeding, thromboembolic event, tissue injury or perforation requiring drainage or surgical repair), alternatives, and potential therapeutic benefits discussed.  Patient is noted to be taking systemic anticoagulation with a temporary hold on dosing.  The fact that he is at increased risk for bleeding and bleeding complication was discussed.  Consent form was reviewed, patient executed document verbalizing understanding and wish to proceed.

## 2023-08-09 NOTE — DISCHARGE INSTRUCTIONS
Medications:   - You can resume your Eliquis tomorrow morning (please hold tonight's dose)    PACEMAKER DISCHARGE INSTRUCTIONS    * Returning to work, driving, and other daily activities should be discussed with your doctor before discharge from the hospital. Generally, driving is safe after 48 hours unless you are taking narcotics for pain relief.    * Notify your doctor immediately if you notice any of the following: fever, chills, warmth, redness, swelling, or drainage at your incision.    * Your pacemaker is now MRI compatible. Please discuss this further with your electrophysiologist before scheduling an MRI.    * House-hold electronics (microwaves, cellular phones) are safe for use.    * Place the temporary identification card in your wallet at time of discharge and replace it with your permanent card once it is mailed to your home in four to eight weeks. Make sure you inform healthcare providers that you have an implanted device, particularly if surgical procedures are scheduled.    * Please read your device 's information booklet for future reference. Write down any questions you may have and bring a list with you to your follow-up appointment.    * Please keep all appointments to have your device checked. You will be scheduled for a follow-up appointment to assess your incision in 7 to 14 days (8/18/2023 9:00 AM. You will also be schedule to have your device checked in about 6 weeks (you have an appointment on 9/19/23 with Dr. Meyer). After that, your device will usually be checked every four to six months.    * Plug in your home-monitoring system for your device at your bedside.  ?    WOUND CARE:    * Keep your incision dry for 48 hours. When showering after that, keep the incision out of a direct stream of water for one week, and use soap and water only. Do not scrub the incision. Instead, pat it dry. Do not apply any creams, lotions, or powders to your incision.    * Your incision has  medical glue, it will flake off on its own. Do not pick it off.    * You should avoid submerging the incision under water such as in a hot tub, bath tub, swimming pool, or ocean until your incision is completely healed in 6 weeks.    THINGS YOU SHOULD AVOID:    * Any large magnets including industrial equipment, induction furnaces, welding, or large electrical motors.    Security systems at airports do not interfere with your device. However, your device is made of metal and will therefore set off metal detectors. Have your device identification card ready for presentation to the . They will search the rest of your body using a handheld wand.    * Some medical procedures use cautery, diathermy, ultrasound therapy, and radiation therapy which may interfere with your device. Notify the performing physician that you have a pacemaker.    * Do not push on the area around your device or twist the device under the skin.

## 2023-08-09 NOTE — PROGRESS NOTES
Electrophysiology Post-Procedure Note:    Preoperative diagnosis/Indication: AV david conduction disease; need for generator replacement with permanent pacemaker  Post-operative diagnosis:  AV david conduction disease; permanent pacemaker insitu    : Dr. GUILLERMO Meyer  Assistant: Leticia Kumar MD    Procedure Summary and Findings:   - Successful Biotronik generator replacement (single chamber PPM).   - Settings: VVI-CLS with base rate of 60    Procedure Details:  - Anesthesia: IV sedation  - wound closure: 2-0 Vicryl x 2, 4-0 Vicryl, dermabond   - complications: none    Plan:  - post-procedure EKG  - resume Eliquis tomorrow morning (hold tonight's dose)  - resume all other home medications  - wound check appointment made for 8/18; follow-up appt with Dr Meyer 9/19.  - discharge this morning

## 2023-08-10 LAB
ATRIAL RATE: 55
QRS DURATION: 212
QT INTERVAL: 540
QTC CALCULATION(BAZETT): 574
R AXIS: -78
T WAVE AXIS: 88
VENTRICULAR RATE: 68

## 2023-08-10 PROCEDURE — 93010 ELECTROCARDIOGRAM REPORT: CPT | Performed by: INTERNAL MEDICINE

## 2023-08-18 ENCOUNTER — OFFICE VISIT (OUTPATIENT)
Dept: CARDIOLOGY | Facility: CLINIC | Age: 88
End: 2023-08-18
Payer: MEDICARE

## 2023-08-18 VITALS
HEIGHT: 74 IN | DIASTOLIC BLOOD PRESSURE: 70 MMHG | BODY MASS INDEX: 25.68 KG/M2 | SYSTOLIC BLOOD PRESSURE: 122 MMHG | RESPIRATION RATE: 16 BRPM | HEART RATE: 79 BPM

## 2023-08-18 DIAGNOSIS — I45.9 HEART BLOCK: Primary | Chronic | ICD-10-CM

## 2023-08-18 PROCEDURE — 99024 POSTOP FOLLOW-UP VISIT: CPT | Performed by: INTERNAL MEDICINE

## 2023-08-18 NOTE — PROGRESS NOTES
Cardiac Electrophysiology    [facility]    [facility]  [address]  [address] [place] [address]    TEL  [phone]  [facility].org/[facility]                                             WOUND CHECK      8/18/2023     Cain Pruitt is a 89 y.o. male who comes to the office today following a single-chamber pacemaker generator change.          DEVICE:          Inventory item: PACEMAKER EDORA SR-T PROMRI W/ CLS  Model/Cat number: [id number]   Serial number: [id number] : [employer]   Device identifier: [id number] Device identifier type: GS1           IMPLANT DATE: 08/09/2023        INCISION SITE: Left chest wall incision clean and dry with Dermabond glue still intact.  Incision was cleansed with an alcohol swab and no drainage was observed while applying light pressure.  No ecchymosis.  Mild pocket swelling.        DEVICE INTERROGATION:   Battery: 9 years 5 months / 100%  Mode: VVI-CLS 60 bpm    RV: Lead impedance 604 ohms, no R wave present at 30 bpm, and capture threshold 2.0 V at 0.4 ms    : 92%    No abnormal episodes recorded on this interrogation.    Of note, I noticed that patient's settings were different from his last office visit. RV capture threshold was 2.0 V at 0.4 ms. Pulse width was pushed out and RV capture threshold was 1.4 V @ 1.0 ms, but unable to change pulse width d/t set auto capture threshold. Biotronik Rep, Tad Burt, present at office visit and was present during implantation, in which he had adjusted settings to preserve battery.        SUMMARY/FOLLOW UP:  Incision care reviewed with patient.  He is enrolled in remote monitoring.  Patient will follow-up as scheduled with Dr. Meyer.            [place] Messer RN        No follow-ups on file.

## 2023-09-19 ENCOUNTER — OFFICE VISIT (OUTPATIENT)
Dept: CARDIOLOGY | Facility: CLINIC | Age: 88
End: 2023-09-19
Payer: MEDICARE

## 2023-09-19 VITALS
OXYGEN SATURATION: 99 % | DIASTOLIC BLOOD PRESSURE: 70 MMHG | RESPIRATION RATE: 18 BRPM | BODY MASS INDEX: 27.72 KG/M2 | HEIGHT: 74 IN | WEIGHT: 216 LBS | SYSTOLIC BLOOD PRESSURE: 140 MMHG

## 2023-09-19 VITALS
BODY MASS INDEX: 26.18 KG/M2 | WEIGHT: 204 LBS | RESPIRATION RATE: 16 BRPM | HEART RATE: 62 BPM | DIASTOLIC BLOOD PRESSURE: 66 MMHG | SYSTOLIC BLOOD PRESSURE: 104 MMHG | HEIGHT: 74 IN

## 2023-09-19 DIAGNOSIS — Z95.1 S/P CABG (CORONARY ARTERY BYPASS GRAFT): Chronic | ICD-10-CM

## 2023-09-19 DIAGNOSIS — I73.9 PAD (PERIPHERAL ARTERY DISEASE) (CMS/HCC): ICD-10-CM

## 2023-09-19 DIAGNOSIS — I25.10 CORONARY ARTERY DISEASE INVOLVING NATIVE CORONARY ARTERY OF NATIVE HEART WITHOUT ANGINA PECTORIS: ICD-10-CM

## 2023-09-19 DIAGNOSIS — Z95.0 PACEMAKER: ICD-10-CM

## 2023-09-19 DIAGNOSIS — Z95.2 S/P TAVR (TRANSCATHETER AORTIC VALVE REPLACEMENT): Primary | Chronic | ICD-10-CM

## 2023-09-19 DIAGNOSIS — I48.20 CHRONIC ATRIAL FIBRILLATION (CMS/HCC): Chronic | ICD-10-CM

## 2023-09-19 DIAGNOSIS — I48.20 CHRONIC ATRIAL FIBRILLATION (CMS/HCC): Primary | ICD-10-CM

## 2023-09-19 DIAGNOSIS — Z95.2 S/P TAVR (TRANSCATHETER AORTIC VALVE REPLACEMENT): Chronic | ICD-10-CM

## 2023-09-19 PROBLEM — I45.9 HEART BLOCK: Chronic | Status: RESOLVED | Noted: 2019-01-24 | Resolved: 2023-09-19

## 2023-09-19 PROCEDURE — 99214 OFFICE O/P EST MOD 30 MIN: CPT | Performed by: INTERNAL MEDICINE

## 2023-09-19 PROCEDURE — 93000 ELECTROCARDIOGRAM COMPLETE: CPT | Performed by: INTERNAL MEDICINE

## 2023-09-19 PROCEDURE — 99024 POSTOP FOLLOW-UP VISIT: CPT | Performed by: INTERNAL MEDICINE

## 2023-09-19 RX ORDER — SEMAGLUTIDE 1.34 MG/ML
0.5 INJECTION, SOLUTION SUBCUTANEOUS WEEKLY
COMMUNITY
Start: 2023-01-31 | End: 2025-03-11

## 2023-09-19 ASSESSMENT — ENCOUNTER SYMPTOMS
ORTHOPNEA: 0
SYNCOPE: 1
ENDOCRINE NEGATIVE: 1
WEIGHT LOSS: 0
WEAKNESS: 0
LIGHT-HEADEDNESS: 0
PND: 0
ALLERGIC/IMMUNOLOGIC NEGATIVE: 1
CHANGE IN BOWEL HABIT: 0
PND: 0
TREMORS: 0
NEAR-SYNCOPE: 1
BACK PAIN: 1
DIZZINESS: 0
ORTHOPNEA: 0
ABDOMINAL PAIN: 0
COUGH: 0
FOCAL WEAKNESS: 0
PALPITATIONS: 0
FEVER: 0
BLOATING: 0
HEARTBURN: 0
WEIGHT GAIN: 0
HEMATURIA: 0
BLURRED VISION: 0
DYSPNEA ON EXERTION: 0
ALTERED MENTAL STATUS: 0
PSYCHIATRIC NEGATIVE: 1
FLANK PAIN: 0
IRREGULAR HEARTBEAT: 0
COUGH: 0
DECREASED APPETITE: 0
PARESTHESIAS: 0
SYNCOPE: 0
HEMATURIA: 0
BRUISES/BLEEDS EASILY: 0
SLEEP DISTURBANCES DUE TO BREATHING: 0
SHORTNESS OF BREATH: 0
BACK PAIN: 1
NEAR-SYNCOPE: 0
WEAKNESS: 0

## 2023-09-19 NOTE — ASSESSMENT & PLAN NOTE
He has chronic asymptomatic rate controlled atrial fibrillation.  He is anticoagulated on Eliquis 5 mg twice daily.  This dose will likely need to be lowered if his creatinine remains elevated.  We will recheck labs at his next visit.

## 2023-09-19 NOTE — ASSESSMENT & PLAN NOTE
He underwent TAVR in 2019. He follows with Dr. Mejia. Last echo in March 2023 showed an EF of 50 to 55% with mild to moderate transvalvular regurgitation.

## 2023-09-19 NOTE — ASSESSMENT & PLAN NOTE
He has a history of coronary artery disease and status post PCI intervention and coronary artery bypass surgery.  He follows closely with Dr. Mejia. He has no anginal symptoms.

## 2023-09-19 NOTE — LETTER
September 19, 2023     Aguilar Phan MD  [address] [facility] [id number]    Patient: Cain Pruitt  YOB: 1932  Date of Visit: 9/19/2023      Dear Dr. Phan:    Thank you for referring Cain Pruitt to me for evaluation. Below are my notes for this consultation.    If you have questions, please do not hesitate to call me. I look forward to following your patient along with you.         Sincerely,        Efe Mejia MD        CC: MD Stevo Deal MD Coady, Paul M, MD  9/19/2023  2:37 PM  Signed     Cardiology Note       Reason for visit: Scheduled cardiology follow-up    Chief Complaint   Patient presents with    Follow-up   Permanent pacemaker  Obstructive sleep apnea  Systemic hypertension  Coronary artery disease status post coronary bypass graft surgery.  Severe symptomatic aortic stenosis status post transcatheter aortic valve replacement 2019     HPI   Cain Pruitt is a 89 y.o. male who presents for cardiac follow-up.  He is accompanied by family.    He was last seen in this office 6 months ago.  Since that time, he did undergo elective planned pacemaker generator change without complication.    He denies anginal chest pain.  He has not had symptoms of PND or orthopnea.  He denies palpitations or lightheadedness.  Very rare episodes of activity related shortness of breath which are brief and noncompromising.    Good appetite and stable weight.  No new medications.  No signs or symptoms of bleeding.  No fever or chills or signs of infection.      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     Colon polyps     Coronary artery disease 1991    s/p cabg     Decreased cardiac ejection  "fraction 1/24/2019    Edema     Erectile dysfunction     GI (gastrointestinal bleed)     GI (gastrointestinal bleed) 06/2016    Heart block 1/24/2019    History of percutaneous coronary intervention 8/12/2019    BMS to SVG to OM 2007    History of transfusion 2011    during colon resection had \"nicked artery\"- required multiple unitos pf blood     Hyperlipidemia     Hypertension     Hypotension     Lumbar herniated disc     SIENA (obstructive sleep apnea) 8/12/2019    Osteomyelitis of toe of right foot (CMS/HCC) 2016    s/p 2nd and 3rd toe osteomylitis     Pacemaker 1/24/2019    PAD (peripheral artery disease) (CMS/[facility]) 8/12/2019    Peripheral neuropathy     tips of fingers  and feet     S/P CABG (coronary artery bypass graft) 1991 1991 LIMA to LAD, ARTURO to RDA, SVG to OM    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    Transfemoral TAVR #29 Evolut Pro on 11-    Sleep apnea     Syncope     2008,2011    Type 2 diabetes mellitus (CMS/HCC)     last HgBA1C was 7.2 in 7/2019 managed by PCP Dr Aguilar Phan    Urinary retention     self cath's 3 times per day,     Vitamin D deficiency      Past Surgical History:   Procedure Laterality Date    AMPUTATION FOOT / TOE Right 2016    2nd and 3rd right foot from osteomylitis     BOWEL RESECTION  2011    CARDIAC PACEMAKER PLACEMENT  2013    CARDIAC SURGERY      CARDIOVERSION  2008    CORONARY ARTERY BYPASS GRAFT  1991    x 4 bypasses     CORONARY STENT PLACEMENT      3 stents    KNEE SURGERY Right 1951    MOHS SURGERY      SKIN BIOPSY       Social History     Socioeconomic History    Marital status:      Spouse name: None    Number of children: None    Years of education: None    Highest education level: None   Tobacco Use    Smoking status: Never    Smokeless tobacco: Never   Vaping Use    Vaping Use: Never used   Substance and Sexual Activity    Alcohol use: Yes     Comment: occassionally/social    Drug " use: Defer    Sexual activity: Defer     Family History   Problem Relation Age of Onset    Clotting disorder Biological Mother     Leukemia Biological Father      Patient has no known allergies.  Current Outpatient Medications   Medication Sig Dispense Refill    apixaban (ELIQUIS) 5 mg tablet Take 0.5 tablets (2.5 mg total) by mouth 2 (two) times a day. To stop 2 days pre-op      aspirin 81 mg enteric coated tablet Take 1 tablet (81 mg total) by mouth daily. 30 tablet 0    bumetanide (BUMEX) 0.5 mg tablet Take 0.5 mg by mouth every morning.        cholecalciferol, vitamin D3, 1,000 unit (25 mcg) tablet Take 1,000 Units by mouth daily.        cyanocobalamin (VITAMIN B12) 1,000 mcg tablet Take 1,000 mcg by mouth daily.      dutasteride (AVODART) 0.5 mg capsule Take 0.5 mg by mouth nightly.    0    ezetimibe 10 mg tablet take 1 tablet by mouth NIGHTLY 90 tablet 3    ferrous sulfate 325 mg (65 mg iron) tablet Take 65 mg by mouth every other day.        ipratropium (ATROVENT) 0.03 % nasal spray Administer 0.03 % into affected nostril(s) as needed. 2 sprays by intranasal route 2-3 times every day in each nostril       LACTOBACILLUS ACIDOPHILUS (PROBIOTIC ORAL) Take by mouth daily.      lisinopriL (PRINIVIL) 10 mg tablet Take 1 tablet (10 mg total) by mouth daily. 90 tablet 3    metFORMIN (GLUCOPHAGE) 500 mg tablet Take 500 mg by mouth daily with breakfast.        metoprolol succinate XL (TOPROL-XL) 50 mg 24 hr tablet Take 50 mg by mouth nightly.        omeprazole (PriLOSEC) 20 mg capsule Take 20 mg by mouth once.      potassium chloride (MICRO-K) 10 mEq CR capsule Take 10 mEq by mouth every other day.        semaglutide (OZEMPIC) 0.25 mg or 0.5 mg(2 mg/1.5 mL) subcutaneous injection Inject 0.5 mg under the skin once a week. Once      silodosin (RAPAFLO) 8 mg capsule Take 8 mg by mouth nightly.         No current facility-administered medications for this visit.       Review of Systems   Constitutional:  Negative for decreased appetite, fever, weight gain and weight loss.   HENT: Negative for congestion and nosebleeds.    Eyes: Negative for blurred vision and visual disturbance.   Cardiovascular: Positive for near-syncope and syncope. Negative for chest pain, leg swelling, orthopnea and paroxysmal nocturnal dyspnea.   Respiratory: Negative for cough and sleep disturbances due to breathing.    Endocrine: Negative.    Hematologic/Lymphatic: Negative for bleeding problem. Does not bruise/bleed easily.   Musculoskeletal: Positive for back pain. Negative for arthritis and joint pain.   Gastrointestinal: Positive for melena. Negative for bloating, change in bowel habit and heartburn.   Genitourinary: Negative for flank pain and hematuria.   Neurological: Negative for dizziness, light-headedness and weakness.   Psychiatric/Behavioral: Negative.    Allergic/Immunologic: Negative.      Objective      Blood pressure 140/70 seated position left arm.  Resting room air saturation 99% with a respiratory rate of 16/min.  Pulse 68 bpm and regular.      Physical Exam:    General Appearance:  Alert, no distress.  In a wheelchair.   Head:  Normocephalic, without obvious abnormality, atraumatic   Eyes:  Conjunctiva/corneas clear, EOM's intact   Neck: No thyroid enlargement. No JVD. No bruits    Lungs:   Clear to auscultation bilaterally, respirations unlabored, no rales, no wheezing   Heart:  Regular rhythm, S1 and S2 normal,  Systolic ejection murmur, rub or gallop.  Single extrasystole heard.  No obvious diastolic murmur heard at rest or provoked with maneuver.   Abdomen:   Soft, non-tender, no masses, no organomegaly   Vascular: Pulses 2+ and symmetric all extremities, no carotid bruit or jugular vein distention   Musculoskeletal:  Skin: No injury or deformity  Scattered solar and seborrheic keratoses about the upper extremities and face.   Extremities: Extremities normal, atraumatic, pulses normal   Behavior/Emotional:  Neurologic:  Appropriate, cooperative  No gross neurologic abnormalities.       Lab Results   Component Value Date    WBC 9.66 11/28/2019    HGB 12.6 (L) 11/28/2019     11/28/2019    CHOL 183 12/20/2017    TRIG 248 (H) 12/20/2017    HDL 42 (L) 12/20/2017    ALT 24 11/13/2019    AST 20 11/13/2019     11/28/2019    K 4.4 08/09/2023    CREATININE 1.2 11/28/2019    TSH 2.00 07/23/2016    INR 1.3 11/27/2019    HGBA1C 8.3 (H) 04/18/2022      Transthoracic echo March 16, 2023:  Interpretation Summary         Left Ventricle: Normal ventricle size. Mild concentric left ventricular hypertrophy. Low normal systolic function. Estimated EF 50-55%. Abnormal septal motion consistent with right ventricular pacemaker. Unable to assess diastolic filling pattern.    Right Ventricle: Normal ventricle size. Low normal systolic function.    Left Atrium: Moderately dilated atrium.    Right Atrium: Moderately dilated atrium.    Aortic Valve: The patient is s/p TAVR #29 Evolut Pro on 11-. The valve is not well visualized, but in limited views appears grossly normal. There is mild to moderate transvalvular regurgitation. Mild to moderate regurgitation with a centrally directed jet. Peak velocity = 1.88 m/s. Mean gradient = 8.00 mmHg.    Mitral Valve: Moderate calcification of the posterior leaflet. Normal leaflet motion. Mild mitral annular calcification. Mild regurgitation. No stenosis.    Tricuspid Valve: Normal structure. Mild regurgitation. Estimated RVSP = 37 mmHg.    Pulmonic Valve: Grossly normal structure. Trace regurgitation. No stenosis.    Pericardium: No evidence of pericardial effusion.    IVC/SVC: Inferior vena cava is a small caliber vessel (<1.7cm). Inferior vena cava collapses >50% during inspiration.    Aorta: Sinuses of Valsalva normal-sized. Aortic arch normal-sized. Mild dilatation of the ascending aorta at 4.3 cm.    Prior Study: Prior study available for comparison. Prior study date: 09/07/2021.  Changes noted compared to prior study.  The ejection fraction is in the low normal range on the current study.  Gradients across the TAVR valve are similar.  The ascending aorta is mildly dilated on the current study.     ASSESSMENT/PLAN    SIENA (obstructive sleep apnea)  He does not use CPAP.     Atrial fibrillation (CMS/[facility])/Heart block/status post permanent pacemaker.  He follows up with Dr. Meyer.  Pacemaker generator was changed without complication.  He continues on rate control agents and anticoagulation long-term.    S/P TAVR (transcatheter aortic valve replacement)  Transfemoral TAVR #29 Evolut Pro on 11-.  No exam evidence of volume overload.  No exam evidence of valvular insufficiency.  An echo was performed this past March demonstrating appropriate position and stable function of the valve prosthesis.    Coronary artery disease involving native coronary artery without angina pectoris/S/P CABG (coronary artery bypass graft)/CHF (congestive heart failure), NYHA class III, acute on chronic, combined (CMS/[facility])  1991 LIMA to LAD, ARTRUO to RDA, SVG to OM. He has no obvious angina or heart failure.  He will remain on his current medications.       Hypertension  Under fair control today on his current medications.  Sodium limitation in his diet was emphasized.     Type 2 diabetes mellitus (CMS/[facility])  He is on oral therapy and follows up with his primary care physician regarding his diabetes.  He was recently started on Ozempic which she is tolerating well.     Hyperlipidemia  He is on lipid-lowering therapy which he tolerates.      No obvious changes in cardiovascular exam.  No recommended changes to medications from my perspective today.  No limits on activity.  Follow-up in 6 months.     Efe Mejia MD  9/19/2023

## 2023-09-19 NOTE — PROGRESS NOTES
"   Cardiology Note       Reason for visit: Scheduled cardiology follow-up    Chief Complaint   Patient presents with    Follow-up   Permanent pacemaker  Obstructive sleep apnea  Systemic hypertension  Coronary artery disease status post coronary bypass graft surgery.  Severe symptomatic aortic stenosis status post transcatheter aortic valve replacement 2019     HPI   Cain Pruitt is a 89 y.o. male who presents for cardiac follow-up.  He is accompanied by family.    He was last seen in this office 6 months ago.  Since that time, he did undergo elective planned pacemaker generator change without complication.    He denies anginal chest pain.  He has not had symptoms of PND or orthopnea.  He denies palpitations or lightheadedness.  Very rare episodes of activity related shortness of breath which are brief and noncompromising.    Good appetite and stable weight.  No new medications.  No signs or symptoms of bleeding.  No fever or chills or signs of infection.      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     Colon polyps     Coronary artery disease 1991    s/p cabg     Decreased cardiac ejection fraction 1/24/2019    Edema     Erectile dysfunction     GI (gastrointestinal bleed)     GI (gastrointestinal bleed) 06/2016    Heart block 1/24/2019    History of percutaneous coronary intervention 8/12/2019    BMS to SVG to OM 2007    History of transfusion 2011    during colon resection had \"nicked artery\"- required multiple unitos pf blood     Hyperlipidemia     Hypertension     Hypotension     Lumbar herniated disc     SIENA (obstructive sleep apnea) 8/12/2019    Osteomyelitis of toe of right foot (CMS/HCC) 2016    s/p 2nd and 3rd toe " osteomylitis     Pacemaker 1/24/2019    PAD (peripheral artery disease) (CMS/[facility]) 8/12/2019    Peripheral neuropathy     tips of fingers  and feet     S/P CABG (coronary artery bypass graft) 1991 1991 LIMA to LAD, ARTURO to RDA, SVG to OM    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    Transfemoral TAVR #29 Evolut Pro on 11-    Sleep apnea     Syncope     2008,2011    Type 2 diabetes mellitus (CMS/[facility])     last HgBA1C was 7.2 in 7/2019 managed by PCP Dr Aguilar Phan    Urinary retention     self cath's 3 times per day,     Vitamin D deficiency      Past Surgical History:   Procedure Laterality Date    AMPUTATION FOOT / TOE Right 2016    2nd and 3rd right foot from osteomylitis     BOWEL RESECTION  2011    CARDIAC PACEMAKER PLACEMENT  2013    CARDIAC SURGERY      CARDIOVERSION  2008    CORONARY ARTERY BYPASS GRAFT  1991    x 4 bypasses     CORONARY STENT PLACEMENT      3 stents    KNEE SURGERY Right 1951    MOHS SURGERY      SKIN BIOPSY       Social History     Socioeconomic History    Marital status:      Spouse name: None    Number of children: None    Years of education: None    Highest education level: None   Tobacco Use    Smoking status: Never    Smokeless tobacco: Never   Vaping Use    Vaping Use: Never used   Substance and Sexual Activity    Alcohol use: Yes     Comment: occassionally/social    Drug use: Defer    Sexual activity: Defer     Family History   Problem Relation Age of Onset    Clotting disorder Biological Mother     Leukemia Biological Father      Patient has no known allergies.  Current Outpatient Medications   Medication Sig Dispense Refill    apixaban (ELIQUIS) 5 mg tablet Take 0.5 tablets (2.5 mg total) by mouth 2 (two) times a day. To stop 2 days pre-op      aspirin 81 mg enteric coated tablet Take 1 tablet (81 mg total) by mouth daily. 30 tablet 0    bumetanide (BUMEX) 0.5 mg tablet Take 0.5 mg by mouth every morning.         cholecalciferol, vitamin D3, 1,000 unit (25 mcg) tablet Take 1,000 Units by mouth daily.        cyanocobalamin (VITAMIN B12) 1,000 mcg tablet Take 1,000 mcg by mouth daily.      dutasteride (AVODART) 0.5 mg capsule Take 0.5 mg by mouth nightly.    0    ezetimibe 10 mg tablet take 1 tablet by mouth NIGHTLY 90 tablet 3    ferrous sulfate 325 mg (65 mg iron) tablet Take 65 mg by mouth every other day.        ipratropium (ATROVENT) 0.03 % nasal spray Administer 0.03 % into affected nostril(s) as needed. 2 sprays by intranasal route 2-3 times every day in each nostril       LACTOBACILLUS ACIDOPHILUS (PROBIOTIC ORAL) Take by mouth daily.      lisinopriL (PRINIVIL) 10 mg tablet Take 1 tablet (10 mg total) by mouth daily. 90 tablet 3    metFORMIN (GLUCOPHAGE) 500 mg tablet Take 500 mg by mouth daily with breakfast.        metoprolol succinate XL (TOPROL-XL) 50 mg 24 hr tablet Take 50 mg by mouth nightly.        omeprazole (PriLOSEC) 20 mg capsule Take 20 mg by mouth once.      potassium chloride (MICRO-K) 10 mEq CR capsule Take 10 mEq by mouth every other day.        semaglutide (OZEMPIC) 0.25 mg or 0.5 mg(2 mg/1.5 mL) subcutaneous injection Inject 0.5 mg under the skin once a week. Once      silodosin (RAPAFLO) 8 mg capsule Take 8 mg by mouth nightly.         No current facility-administered medications for this visit.       Review of Systems   Constitutional: Negative for decreased appetite, fever, weight gain and weight loss.   HENT: Negative for congestion and nosebleeds.    Eyes: Negative for blurred vision and visual disturbance.   Cardiovascular: Positive for near-syncope and syncope. Negative for chest pain, leg swelling, orthopnea and paroxysmal nocturnal dyspnea.   Respiratory: Negative for cough and sleep disturbances due to breathing.    Endocrine: Negative.    Hematologic/Lymphatic: Negative for bleeding problem. Does not bruise/bleed easily.   Musculoskeletal: Positive for back pain. Negative  for arthritis and joint pain.   Gastrointestinal: Positive for melena. Negative for bloating, change in bowel habit and heartburn.   Genitourinary: Negative for flank pain and hematuria.   Neurological: Negative for dizziness, light-headedness and weakness.   Psychiatric/Behavioral: Negative.    Allergic/Immunologic: Negative.      Objective      Blood pressure 140/70 seated position left arm.  Resting room air saturation 99% with a respiratory rate of 16/min.  Pulse 68 bpm and regular.      Physical Exam:    General Appearance:  Alert, no distress.  In a wheelchair.   Head:  Normocephalic, without obvious abnormality, atraumatic   Eyes:  Conjunctiva/corneas clear, EOM's intact   Neck: No thyroid enlargement. No JVD. No bruits    Lungs:   Clear to auscultation bilaterally, respirations unlabored, no rales, no wheezing   Heart:  Regular rhythm, S1 and S2 normal,  Systolic ejection murmur, rub or gallop.  Single extrasystole heard.  No obvious diastolic murmur heard at rest or provoked with maneuver.   Abdomen:   Soft, non-tender, no masses, no organomegaly   Vascular: Pulses 2+ and symmetric all extremities, no carotid bruit or jugular vein distention   Musculoskeletal:  Skin: No injury or deformity  Scattered solar and seborrheic keratoses about the upper extremities and face.   Extremities: Extremities normal, atraumatic, pulses normal   Behavior/Emotional:  Neurologic: Appropriate, cooperative  No gross neurologic abnormalities.       Lab Results   Component Value Date    WBC 9.66 11/28/2019    HGB 12.6 (L) 11/28/2019     11/28/2019    CHOL 183 12/20/2017    TRIG 248 (H) 12/20/2017    HDL 42 (L) 12/20/2017    ALT 24 11/13/2019    AST 20 11/13/2019     11/28/2019    K 4.4 08/09/2023    CREATININE 1.2 11/28/2019    TSH 2.00 07/23/2016    INR 1.3 11/27/2019    HGBA1C 8.3 (H) 04/18/2022      Transthoracic echo March 16, 2023:  Interpretation Summary         Left Ventricle: Normal ventricle size. Mild  concentric left ventricular hypertrophy. Low normal systolic function. Estimated EF 50-55%. Abnormal septal motion consistent with right ventricular pacemaker. Unable to assess diastolic filling pattern.    Right Ventricle: Normal ventricle size. Low normal systolic function.    Left Atrium: Moderately dilated atrium.    Right Atrium: Moderately dilated atrium.    Aortic Valve: The patient is s/p TAVR #29 Evolut Pro on 11-. The valve is not well visualized, but in limited views appears grossly normal. There is mild to moderate transvalvular regurgitation. Mild to moderate regurgitation with a centrally directed jet. Peak velocity = 1.88 m/s. Mean gradient = 8.00 mmHg.    Mitral Valve: Moderate calcification of the posterior leaflet. Normal leaflet motion. Mild mitral annular calcification. Mild regurgitation. No stenosis.    Tricuspid Valve: Normal structure. Mild regurgitation. Estimated RVSP = 37 mmHg.    Pulmonic Valve: Grossly normal structure. Trace regurgitation. No stenosis.    Pericardium: No evidence of pericardial effusion.    IVC/SVC: Inferior vena cava is a small caliber vessel (<1.7cm). Inferior vena cava collapses >50% during inspiration.    Aorta: Sinuses of Valsalva normal-sized. Aortic arch normal-sized. Mild dilatation of the ascending aorta at 4.3 cm.    Prior Study: Prior study available for comparison. Prior study date: 09/07/2021. Changes noted compared to prior study.  The ejection fraction is in the low normal range on the current study.  Gradients across the TAVR valve are similar.  The ascending aorta is mildly dilated on the current study.     ASSESSMENT/PLAN    SIENA (obstructive sleep apnea)  He does not use CPAP.     Atrial fibrillation (CMS/HCC)/Heart block/status post permanent pacemaker.  He follows up with Dr. Meyer.  Pacemaker generator was changed without complication.  He continues on rate control agents and anticoagulation long-term.    S/P TAVR (transcatheter  aortic valve replacement)  Transfemoral TAVR #29 Evolut Pro on 11-.  No exam evidence of volume overload.  No exam evidence of valvular insufficiency.  An echo was performed this past March demonstrating appropriate position and stable function of the valve prosthesis.    Coronary artery disease involving native coronary artery without angina pectoris/S/P CABG (coronary artery bypass graft)/CHF (congestive heart failure), NYHA class III, acute on chronic, combined (CMS/[facility])  1991 LIMA to LAD, ARTURO to RDA, SVG to OM. He has no obvious angina or heart failure.  He will remain on his current medications.       Hypertension  Under fair control today on his current medications.  Sodium limitation in his diet was emphasized.     Type 2 diabetes mellitus (CMS/[facility])  He is on oral therapy and follows up with his primary care physician regarding his diabetes.  He was recently started on Ozempic which she is tolerating well.     Hyperlipidemia  He is on lipid-lowering therapy which he tolerates.      No obvious changes in cardiovascular exam.  No recommended changes to medications from my perspective today.  No limits on activity.  Follow-up in 6 months.     Efe Mejia MD  9/19/2023

## 2023-09-19 NOTE — PROGRESS NOTES
"     Electrophysiology  Outpatient Progress Note       Reason for visit:   Chief Complaint   Patient presents with    Device Check       HPI   Cain Pruitt is a 89 y.o. male who is seen today in this office for follow up of chronic permanent atrial fibrillation and sick sinus syndrome.  He is status post generator change on 8/9.  He presents today with his son.  He was started on Ozempic for management of his diabetes and has had a significant weight loss.  He also reports of back pain which has impacted his activity levels.    He has a permanent pacemaker.  He has a history of coronary heart disease status post percutaneous coronary intervention and bypass graft surgery.        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     Colon polyps     Coronary artery disease 1991    s/p cabg     Decreased cardiac ejection fraction 1/24/2019    Edema     Erectile dysfunction     GI (gastrointestinal bleed)     GI (gastrointestinal bleed) 06/2016    Heart block 1/24/2019    History of percutaneous coronary intervention 8/12/2019    BMS to SVG to OM 2007    History of transfusion 2011    during colon resection had \"nicked artery\"- required multiple unitos pf blood     Hyperlipidemia     Hypertension     Hypotension     Lumbar herniated disc     SIENA (obstructive sleep apnea) 8/12/2019    Osteomyelitis of toe of right foot (CMS/[facility]) 2016    s/p 2nd and 3rd toe osteomylitis     Pacemaker 1/24/2019    PAD (peripheral artery disease) (CMS/[facility]) 8/12/2019    Peripheral neuropathy     tips of fingers  and feet     S/P CABG (coronary artery bypass graft) 1991 1991 LIMA to LAD, ARTURO to RDA, SVG to OM    S/P TAVR (transcatheter aortic valve replacement) " 12/4/2019    Transfemoral TAVR #29 Evolut Pro on 11-    Sleep apnea     Syncope     2008,2011    Type 2 diabetes mellitus (CMS/HCC)     last HgBA1C was 7.2 in 7/2019 managed by PCP Dr Aguilar Phan    Urinary retention     self cath's 3 times per day,     Vitamin D deficiency      Past Surgical History:   Procedure Laterality Date    AMPUTATION FOOT / TOE Right 2016    2nd and 3rd right foot from osteomylitis     BOWEL RESECTION  2011    CARDIAC PACEMAKER PLACEMENT  2013    CARDIAC SURGERY      CARDIOVERSION  2008    CORONARY ARTERY BYPASS GRAFT  1991    x 4 bypasses     CORONARY STENT PLACEMENT      3 stents    KNEE SURGERY Right 1951    MOHS SURGERY      SKIN BIOPSY         Social History     Tobacco Use    Smoking status: Never    Smokeless tobacco: Never   Vaping Use    Vaping Use: Never used   Substance Use Topics    Alcohol use: Yes     Comment: occassionally/social    Drug use: Defer     Family History   Problem Relation Age of Onset    Clotting disorder Biological Mother     Leukemia Biological Father        ALLERGY:  Patient has no known allergies.    Current Outpatient Medications   Medication Sig Dispense Refill    apixaban (ELIQUIS) 5 mg tablet Take 0.5 tablets (2.5 mg total) by mouth 2 (two) times a day. To stop 2 days pre-op      aspirin 81 mg enteric coated tablet Take 1 tablet (81 mg total) by mouth daily. 30 tablet 0    bumetanide (BUMEX) 0.5 mg tablet Take 0.5 mg by mouth every morning.        cholecalciferol, vitamin D3, 1,000 unit (25 mcg) tablet Take 1,000 Units by mouth daily.        cyanocobalamin (VITAMIN B12) 1,000 mcg tablet Take 1,000 mcg by mouth daily.      dutasteride (AVODART) 0.5 mg capsule Take 0.5 mg by mouth nightly.    0    ezetimibe 10 mg tablet take 1 tablet by mouth NIGHTLY 90 tablet 3    ferrous sulfate 325 mg (65 mg iron) tablet Take 65 mg by mouth every other day.        ipratropium (ATROVENT) 0.03 % nasal spray Administer 0.03 % into  affected nostril(s) as needed. 2 sprays by intranasal route 2-3 times every day in each nostril       LACTOBACILLUS ACIDOPHILUS (PROBIOTIC ORAL) Take by mouth daily.      lisinopriL (PRINIVIL) 10 mg tablet Take 1 tablet (10 mg total) by mouth daily. 90 tablet 3    metFORMIN (GLUCOPHAGE) 500 mg tablet Take 500 mg by mouth daily with breakfast.        metoprolol succinate XL (TOPROL-XL) 50 mg 24 hr tablet Take 50 mg by mouth nightly.        omeprazole (PriLOSEC) 20 mg capsule Take 20 mg by mouth once.      semaglutide (OZEMPIC) 0.25 mg or 0.5 mg(2 mg/1.5 mL) subcutaneous injection Inject 0.5 mg under the skin once a week. Once      silodosin (RAPAFLO) 8 mg capsule Take 8 mg by mouth nightly.        potassium chloride (MICRO-K) 10 mEq CR capsule Take 10 mEq by mouth every other day.         No current facility-administered medications for this visit.       Review of Systems   Constitutional: Negative for malaise/fatigue.   HENT: Negative for hearing loss.    Eyes: Negative for visual disturbance.   Cardiovascular: Negative for chest pain, dyspnea on exertion, irregular heartbeat, leg swelling, near-syncope, orthopnea, palpitations, paroxysmal nocturnal dyspnea and syncope.   Respiratory: Negative for cough and shortness of breath.    Hematologic/Lymphatic: Negative for bleeding problem.   Skin: Negative for rash.   Musculoskeletal: Positive for back pain. Negative for joint pain and muscle weakness.   Gastrointestinal: Negative for abdominal pain.   Genitourinary: Negative for hematuria.   Neurological: Negative for focal weakness, paresthesias, tremors and weakness.   Psychiatric/Behavioral: Negative for altered mental status.       Objective   Vitals:    09/19/23 1319   BP: 104/66   Pulse: 62   Resp: 16     BP Readings from Last 3 Encounters:   09/19/23 140/70   09/19/23 104/66   08/18/23 122/70       Physical Exam  Constitutional:       Appearance: He is well-developed.   HENT:      Head: Normocephalic and  atraumatic.   Eyes:      Conjunctiva/sclera: Conjunctivae normal.      Pupils: Pupils are equal, round, and reactive to light.   Cardiovascular:      Rate and Rhythm: Normal rate. Rhythm irregular.      Heart sounds: Murmur heard.       Harsh midsystolic murmur is present with a grade of 3/6 at the upper right sternal border radiating to the neck.  Pulmonary:      Breath sounds: Normal breath sounds. No wheezing or rales.   Abdominal:      General: Bowel sounds are normal.      Palpations: Abdomen is soft. There is no mass.      Tenderness: There is no abdominal tenderness.   Musculoskeletal:         General: No deformity. Normal range of motion.      Cervical back: Normal range of motion.   Skin:     General: Skin is warm and dry.      Findings: No rash.   Neurological:      Mental Status: He is alert and oriented to person, place, and time.      Cranial Nerves: No cranial nerve deficit.   Psychiatric:         Behavior: Behavior normal.         Lab Results   Component Value Date    WBC 9.66 11/28/2019    HGB 12.6 (L) 11/28/2019     11/28/2019    CHOL 183 12/20/2017    TRIG 248 (H) 12/20/2017    HDL 42 (L) 12/20/2017    ALT 24 11/13/2019    AST 20 11/13/2019     11/28/2019    K 4.4 08/09/2023    CREATININE 1.2 11/28/2019    TSH 2.00 07/23/2016    INR 1.3 11/27/2019    HGBA1C 8.3 (H) 04/18/2022       Cardiovascular Procedures:    CATH LAB:  Cath - 11/1/2007    ECHO/MUGA:  Echo - 7/15/2010  Echo (Scanned Transthoracic Echo. EF 50%.) - 11/1/2016  Echo (scanned Transthoracic Echocardiography Report EF 45 %) - 12/20/2017.Technically difficult and suboptimal echocardiogram secondary to patient characteristics.Mild concentric left ventricular hypertrophy with normal cavity size and mildly decreased systolic function. Ejection fraction 45%.Unable to evaluate for wall motion abnormalities given difficulty visualizing endocardial borders.Severe aortic stenosis with peak velocity of 4.1 m/s (Peak instantaneous  gradient of 66mmHg) and mean gradient of 34 mmHg.Calculated aortic valve area 0.77cm2. Mild aortic insufficiency.Mild mitral regurgitation. Moderately dilated left atrium.Moderately dilated right ventricular size with preserved systolic function. Mild tricuspid regurgitation with estimated right ventricular systolic pressure of 46 mmHg. Compared to the prior study from 11/2/16, left ventricular function has mildly decreased frrom 50% to 45%. Aortic valve peak gradient has increased from 54mmHg to 66mHg and mean gradient has increased from 24mmHg to 34 mmHg.  Echo 4/24/2019 Critical calcific aortic valve stenosis, aortic valve area 0.66 cm².  · Mildly dilated left ventricular cavity. Moderate concentric left ventricular hypertrophy. Mildly decreased systolic function. Estimated EF = 40- 45%. Diffuse hypokinesis.  · Normal-sized right ventricular cavity with preserved systolic function.  · Mildly dilated right atrium.  · Moderately dilated left atrium.  · Severe mitral valve calcification of the posterior leaflet. Sclerotic mitral valve. Mild mitral valve regurgitation.  · Mild tricuspid valve regurgitation with RVSP of 63 mmHg.  · Aortic root calcificied. The sinuses of Valsalva calcified. Effacement of the sinotubular junction.  · IVC not well visualized.  Normal pericardium. No evidence of pericardial effusion.    Echocardiogram 9/7/2021  1.  Technically adequate study.  2.  The rhythm appears to be atrial fibrillation with electronic ventricular pacing.  3.  Mildly dilated left ventricle with overall mildly diminished left ventricular systolic function.  Ejection fraction estimated at 40 to 45%.  Abnormal wall motion secondary to a paced rhythm.  4.  Status post transcatheter heart valve with a #29 [employer] device in the aortic position.  No significant aortic stenosis.  Mild paravalvular aortic insufficiency.  Estimated aortic valve area 1.7 to 1.8 cm².  5.  Moderately dilated left atrium with mild mitral  annular calcification and trace to mild mitral regurgitation.  6.  Trace pulmonic insufficiency.  7.  Normal left ventricular cavity size with normal wall thickness and preserved systolic function.  Pacemaker leads are present in the right ventricle and right atrium.  8.  Trace to mild tricuspid regurgitation.  9.  Compared to a study of December 17, 2019, no significant change.    ELECTROPHYSIOLOGY:  Devices (Single Chamber PPM (Biotronik-Evia SRT), serial # [id number], ventricular lead(9/16/13)biotronik S60) -9/16/2013    STRESS TESTS:  SEH - 5/20/2009    VASCULAR:  Carotid Duplex (scanned Vascular Carotid) - 12/22/2014    CT/MRI:  Abd/Pelvis CT - 2/7/2012    OTHER:  CXR - 12/3/2011 he has a history of coronary artery disease and is status post PCI intervention and coronary artery bypass surgery.  He follows closely with Dr. Barrett.  He has no anginal symptoms.  Other (ABDOMINAL AORTIC US) - 1/20/2011  Other (CAROTID US) - 1/20/2011    TAVR 11/27/2019  s/p successful TF TAVR with 29 mm Evolut Pro    ECG   V pacing    Assessment   Problem List Items Addressed This Visit        Circulatory    Atrial fibrillation (CMS/HCC) - Primary (Chronic)     He has chronic asymptomatic rate controlled atrial fibrillation.  He is anticoagulated on Eliquis 5 mg twice daily.  This dose will likely need to be lowered if his creatinine remains elevated.  We will recheck labs at his next visit.         Relevant Orders    ECG 12 LEAD-OFFICE PERFORMED (Completed)    S/P TAVR (transcatheter aortic valve replacement) (Chronic)     He underwent TAVR in 2019. He follows with Dr. Mejia. Last echo in March 2023 showed an EF of 50 to 55% with mild to moderate transvalvular regurgitation.         Coronary artery disease involving native coronary artery without angina pectoris     He has a history of coronary artery disease and status post PCI intervention and coronary artery bypass surgery.  He follows closely with Dr. Mejia. He has no anginal  symptoms.          Pacemaker     He has a [employer] single-chamber pacemaker that was implanted on 8/9/2023.  Mode is VVI-CLS with a base rate of 60.  There is ventricular pacing 94 % of the time.  Battery longevity is 9 years.  The RV lead measures and R wave of 19.8 mV, pacing threshold 1.1 V at 0.4 ms, lead impedance of 585 ohms.  There were no changes made to the permanent programming of this device.                 Steov Meyer MD  09/19/2023

## 2023-09-19 NOTE — ASSESSMENT & PLAN NOTE
He has a [employer] single-chamber pacemaker that was implanted on 8/9/2023.  Mode is VVI-CLS with a base rate of 60.  There is ventricular pacing 94 % of the time.  Battery longevity is 9 years.  The RV lead measures and R wave of 19.8 mV, pacing threshold 1.1 V at 0.4 ms, lead impedance of 585 ohms.  There were no changes made to the permanent programming of this device.

## 2024-03-29 ENCOUNTER — TELEPHONE (OUTPATIENT)
Dept: UROLOGY | Facility: CLINIC | Age: 89
End: 2024-03-29

## 2024-03-29 RX ORDER — CAYENNE 450 MG
1 CAPSULE ORAL DAILY
COMMUNITY

## 2024-03-29 RX ORDER — LANOLIN ALCOHOL/MO/W.PET/CERES
65 CREAM (GRAM) TOPICAL EVERY OTHER DAY
COMMUNITY

## 2024-03-29 RX ORDER — SILODOSIN 8 MG/1
8 CAPSULE ORAL DAILY
COMMUNITY
Start: 2024-01-04

## 2024-03-29 RX ORDER — LISINOPRIL 10 MG/1
10 TABLET ORAL DAILY
COMMUNITY
Start: 2023-10-13

## 2024-03-29 RX ORDER — METOPROLOL SUCCINATE 50 MG/1
50 TABLET, EXTENDED RELEASE ORAL DAILY
COMMUNITY
Start: 2023-11-09

## 2024-03-29 RX ORDER — DUTASTERIDE 0.5 MG/1
0.5 CAPSULE, LIQUID FILLED ORAL DAILY
COMMUNITY
Start: 2024-03-06

## 2024-03-29 RX ORDER — EZETIMIBE 10 MG/1
10 TABLET ORAL DAILY
COMMUNITY

## 2024-03-29 NOTE — TELEPHONE ENCOUNTER
Left message for patient to cecilia the office back regarding upcoming appointment. Will have to reschedule patient's upcoming appointment due to he was scheduled as a return visit. Patient has not been seen by [facility] since 2018 and at that time was seen by Dalia Magaña. No records noted from Dr. Henao. Faxed [facility] to see if they have any records from before 2018 with Dr. Henao. Patient is considered a new patient and will have to be rescheduled. When patient calls back please assist with rescheduling patient in a new patient slot.

## 2024-04-01 ASSESSMENT — ENCOUNTER SYMPTOMS
FOCAL WEAKNESS: 0
PALPITATIONS: 0
BACK PAIN: 1
NEAR-SYNCOPE: 0
SYNCOPE: 0
HEMATURIA: 0
COUGH: 0
TREMORS: 0
WEAKNESS: 0
SHORTNESS OF BREATH: 0
PND: 0
DYSPNEA ON EXERTION: 0
ALTERED MENTAL STATUS: 0
ORTHOPNEA: 0
PARESTHESIAS: 0
IRREGULAR HEARTBEAT: 0
ABDOMINAL PAIN: 0

## 2024-04-01 NOTE — PROGRESS NOTES
"     Electrophysiology  Outpatient Progress Note       Reason for visit:   Chief Complaint   Patient presents with    Atrial Fibrillation       HPI   Cain Pruitt is a 89 y.o. male who is seen today in this office for follow up of chronic permanent atrial fibrillation and sick sinus syndrome.  He had a generator change in August 2023.    He presents today accompanied by his son.  He reports difficulty walking as he is limited by his conditioning and fatigue.  He denies chest pain, shortness of breath, lightheadedness, dizziness, palpitations, or syncope.    He has a permanent pacemaker.  He has a history of coronary heart disease status post percutaneous coronary intervention and bypass graft surgery.          Past Medical History:   Diagnosis Date    A-fib (CMS/[facility])     Arthritis     b/l knees    BPH (benign prostatic hyperplasia)     CAD (coronary artery disease)     4 stents placed since 1991    Cancer of cecum (CMS/[facility]) 2011    S/p resection 2011    Cecum cancer (CMS/[facility])     12/2/2011 no chemo or XRT.     Colon polyps     Coronary artery disease 1991    s/p cabg     Decreased cardiac ejection fraction 1/24/2019    Edema     Erectile dysfunction     GI (gastrointestinal bleed)     GI (gastrointestinal bleed) 06/2016    Heart block 1/24/2019    History of percutaneous coronary intervention 8/12/2019    BMS to SVG to OM 2007    History of transfusion 2011    during colon resection had \"nicked artery\"- required multiple unitos pf blood     Hyperlipidemia     Hypertension     Hypotension     Lumbar herniated disc     SIENA (obstructive sleep apnea) 8/12/2019    Osteomyelitis of toe of right foot (CMS/[facility]) 2016    s/p 2nd and 3rd toe osteomylitis     Pacemaker 1/24/2019    PAD (peripheral artery disease) (CMS/[facility]) 8/12/2019    Peripheral neuropathy     tips of fingers  and feet     S/P CABG (coronary artery bypass graft) 1991 1991 LIMA to LAD, ARTURO to RDA, SVG to OM    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    Transfemoral TAVR #29 Evolut Pro on 11-    Sleep apnea     Syncope     2008,2011    Type 2 diabetes mellitus (CMS/[facility])     last HgBA1C was 7.2 in 7/2019 managed by PCP Dr Aguilar Phan    Urinary retention     self cath's 3 times per day,     Vitamin D deficiency      Past Surgical History:   Procedure Laterality Date    AMPUTATION FOOT / TOE Right 2016    2nd and 3rd right foot from osteomylitis     BOWEL RESECTION  2011    CARDIAC PACEMAKER PLACEMENT  2013    CARDIAC SURGERY      CARDIOVERSION  2008    CORONARY ARTERY BYPASS GRAFT  1991    x 4 bypasses     CORONARY STENT PLACEMENT      3 stents    KNEE SURGERY Right 1951    MOHS SURGERY      SKIN BIOPSY         Social History     Tobacco Use    Smoking status: Never    Smokeless tobacco: Never   Vaping Use    Vaping Use: Never used   Substance Use Topics    Alcohol use: Yes     Comment: occassionally/social    Drug use: Defer     Family History   Problem Relation Age of Onset    Clotting disorder Biological Mother     Leukemia Biological Father        ALLERGY:  Patient has no known allergies.    Current Outpatient Medications   Medication Sig Dispense Refill    apixaban (ELIQUIS) 5 mg tablet Take 0.5 tablets (2.5 mg total) by mouth 2 (two) times a day. To stop 2 days pre-op      aspirin 81 mg enteric coated tablet Take 1 tablet (81 mg total) by mouth daily. 30 tablet 0    bumetanide (BUMEX) 0.5 mg tablet Take 0.5 mg by mouth every morning.        cholecalciferol, vitamin D3, 1,000 unit (25 mcg) tablet Take 1,000 Units by mouth daily.        cyanocobalamin (VITAMIN B12) 1,000 mcg tablet Take 1,000 mcg by mouth daily.      dutasteride (AVODART) 0.5 mg capsule Take 0.5 mg by mouth nightly.    0    ezetimibe 10 mg tablet take 1 tablet by mouth NIGHTLY 90 tablet 3    ferrous sulfate 325 mg (65 mg iron) tablet Take 65 mg by mouth every other day.        ipratropium (ATROVENT) 0.03 % nasal spray Administer 0.03 % into affected nostril(s)  as needed. 2 sprays by intranasal route 2-3 times every day in each nostril       LACTOBACILLUS ACIDOPHILUS (PROBIOTIC ORAL) Take by mouth daily.      lisinopriL (PRINIVIL) 10 mg tablet Take 1 tablet (10 mg total) by mouth daily. 90 tablet 3    metFORMIN (GLUCOPHAGE) 500 mg tablet Take 500 mg by mouth daily with breakfast.        metoprolol succinate XL (TOPROL-XL) 50 mg 24 hr tablet Take 50 mg by mouth nightly.        omeprazole (PriLOSEC) 20 mg capsule Take 20 mg by mouth once.      semaglutide (OZEMPIC) 0.25 mg or 0.5 mg(2 mg/1.5 mL) subcutaneous injection Inject 0.5 mg under the skin once a week. Once      silodosin (RAPAFLO) 8 mg capsule Take 8 mg by mouth nightly.         No current facility-administered medications for this visit.       Review of Systems   Constitutional: Positive for malaise/fatigue.   HENT:  Negative for hearing loss.    Eyes:  Negative for visual disturbance.   Cardiovascular:  Negative for chest pain, dyspnea on exertion, irregular heartbeat, leg swelling, near-syncope, orthopnea, palpitations, paroxysmal nocturnal dyspnea and syncope.   Respiratory:  Negative for cough and shortness of breath.    Hematologic/Lymphatic: Negative for bleeding problem.   Skin:  Negative for rash.   Musculoskeletal:  Positive for back pain. Negative for joint pain and muscle weakness.   Gastrointestinal:  Negative for abdominal pain.   Genitourinary:  Negative for hematuria.   Neurological:  Negative for focal weakness, paresthesias, tremors and weakness.   Psychiatric/Behavioral:  Negative for altered mental status.        Objective   Vitals:    04/02/24 1337   BP: 136/60     BP Readings from Last 3 Encounters:   04/02/24 136/60   04/02/24 136/60   09/19/23 140/70       Physical Exam  Constitutional:       Appearance: He is well-developed.   HENT:      Head: Normocephalic and atraumatic.   Eyes:      Conjunctiva/sclera: Conjunctivae normal.      Pupils: Pupils are equal, round, and reactive to light.    Cardiovascular:      Rate and Rhythm: Normal rate. Rhythm irregular.      Heart sounds: Murmur heard.      Harsh midsystolic murmur is present with a grade of 3/6 at the upper right sternal border radiating to the neck.   Pulmonary:      Breath sounds: Normal breath sounds. No wheezing or rales.   Abdominal:      General: Bowel sounds are normal.      Palpations: Abdomen is soft. There is no mass.      Tenderness: There is no abdominal tenderness.   Musculoskeletal:         General: No deformity. Normal range of motion.      Cervical back: Normal range of motion.   Skin:     General: Skin is warm and dry.      Findings: No rash.   Neurological:      Mental Status: He is alert and oriented to person, place, and time.      Cranial Nerves: No cranial nerve deficit.   Psychiatric:         Behavior: Behavior normal.         Lab Results   Component Value Date    WBC 9.66 11/28/2019    HGB 12.6 (L) 11/28/2019     11/28/2019    CHOL 183 12/20/2017    TRIG 248 (H) 12/20/2017    HDL 42 (L) 12/20/2017    ALT 24 11/13/2019    AST 20 11/13/2019     11/28/2019    K 4.4 08/09/2023    CREATININE 1.2 11/28/2019    TSH 2.00 07/23/2016    INR 1.3 11/27/2019    HGBA1C 8.3 (H) 04/18/2022       Cardiovascular Procedures:    CATH LAB:  Cath - 11/1/2007    ECHO/MUGA:  Echo - 7/15/2010  Echo (Scanned Transthoracic Echo. EF 50%.) - 11/1/2016  Echo (scanned Transthoracic Echocardiography Report EF 45 %) - 12/20/2017.Technically difficult and suboptimal echocardiogram secondary to patient characteristics.Mild concentric left ventricular hypertrophy with normal cavity size and mildly decreased systolic function. Ejection fraction 45%.Unable to evaluate for wall motion abnormalities given difficulty visualizing endocardial borders.Severe aortic stenosis with peak velocity of 4.1 m/s (Peak instantaneous gradient of 66mmHg) and mean gradient of 34 mmHg.Calculated aortic valve area 0.77cm2. Mild aortic insufficiency.Mild mitral  regurgitation. Moderately dilated left atrium.Moderately dilated right ventricular size with preserved systolic function. Mild tricuspid regurgitation with estimated right ventricular systolic pressure of 46 mmHg. Compared to the prior study from 11/2/16, left ventricular function has mildly decreased frrom 50% to 45%. Aortic valve peak gradient has increased from 54mmHg to 66mHg and mean gradient has increased from 24mmHg to 34 mmHg.  Echo 4/24/2019: Critical calcific aortic valve stenosis, EF = 40- 45%. Diffuse Echocardiogram 9/7/2021: Ejection fraction estimated at 40 to 45%.  Status post transcatheter heart valve with a #29 Med[employer] device in the aortic position.  N  TTE 3/16/2023     Left Ventricle: Normal ventricle size. Mild concentric left ventricular hypertrophy. Low normal systolic function. Estimated EF 50-55%. Abnormal septal motion consistent with right ventricular pacemaker. Unable to assess diastolic filling pattern.    Right Ventricle: Normal ventricle size. Low normal systolic function.    Left Atrium: Moderately dilated atrium.    Right Atrium: Moderately dilated atrium.    Aortic Valve: The patient is s/p TAVR #29 Evolut Pro on 11-. The valve is not well visualized, but in limited views appears grossly normal. There is mild to moderate transvalvular regurgitation. Mild to moderate regurgitation with a centrally directed jet. Peak velocity = 1.88 m/s. Mean gradient = 8.00 mmHg.    Mitral Valve: Moderate calcification of the posterior leaflet. Normal leaflet motion. Mild mitral annular calcification. Mild regurgitation. No stenosis.    Tricuspid Valve: Normal structure. Mild regurgitation. Estimated RVSP = 37 mmHg.    Pulmonic Valve: Grossly normal structure. Trace regurgitation. No stenosis.    Pericardium: No evidence of pericardial effusion.    IVC/SVC: Inferior vena cava is a small caliber vessel (<1.7cm). Inferior vena cava collapses >50% during inspiration.    Aorta: Sinuses of  Valsalva normal-sized. Aortic arch normal-sized. Mild dilatation of the ascending aorta at 4.3 cm.    Prior Study: Prior study available for comparison. Prior study date: 09/07/2021. Changes noted compared to prior study.  The ejection fraction is in the low normal range on the current study.  Gradients across the TAVR valve are similar.  The ascending aorta is mildly dilated on the current study.    ELECTROPHYSIOLOGY:  Devices (Single Chamber PPM (Biotronik-Evia SRT), serial # [id number], ventricular lead(9/16/13)biotronik S60) -9/16/2013    STRESS TESTS:  SEH - 5/20/2009    VASCULAR:  Carotid Duplex (scanned Vascular Carotid) - 12/22/2014    CT/MRI:  Abd/Pelvis CT - 2/7/2012    OTHER:  CXR - 12/3/2011 he has a history of coronary artery disease and is status post PCI intervention and coronary artery bypass surgery.  He follows closely with Dr. Barrett.  He has no anginal symptoms.  Other (ABDOMINAL AORTIC US) - 1/20/2011  Other (CAROTID US) - 1/20/2011    TAVR 11/27/2019  s/p successful TF TAVR with 29 mm Evolut Pro    ECG   V pacing    Assessment   Problem List Items Addressed This Visit          Circulatory    Atrial fibrillation (CMS/HCC) (Chronic)     He has chronic asymptomatic rate controlled atrial fibrillation.  He is anticoagulated on Eliquis 2.5 mg twice daily.         S/P CABG (coronary artery bypass graft) (Chronic)     He has a history of CAD status post PCI intervention and CABG.  He has no anginal symptoms.  He was seen by Dr. Mejia earlier today         S/P TAVR (transcatheter aortic valve replacement) - Primary (Chronic)     He is status post TAVR in 2019.  Last echo March 2023 showed an EF of 50 to 55% with mild to moderate transvalvular regurgitation.         Relevant Orders    ECG 12 LEAD-OFFICE PERFORMED    Pacemaker     He has a normally functioning Biotronik single-chamber pacemaker.  Battery longevity is 8 years 6 months until [employer].  Mode is VVI-CLS with a base rate of 60.  The RV lead measures  and R wave of 19.1 mV, a lead impedance of 585 ohms, and a pacing threshold of 1.6 V at 0.4 ms.  There is V pacing 91% of the time.  No changes made to the permanent programming of this device.                 Stevo Meyer MD  04/02/2024

## 2024-04-02 ENCOUNTER — OFFICE VISIT (OUTPATIENT)
Dept: CARDIOLOGY | Facility: CLINIC | Age: 89
End: 2024-04-02
Payer: MEDICARE

## 2024-04-02 VITALS
OXYGEN SATURATION: 98 % | SYSTOLIC BLOOD PRESSURE: 136 MMHG | HEART RATE: 68 BPM | HEIGHT: 74 IN | WEIGHT: 216 LBS | RESPIRATION RATE: 17 BRPM | BODY MASS INDEX: 27.72 KG/M2 | DIASTOLIC BLOOD PRESSURE: 60 MMHG

## 2024-04-02 VITALS — SYSTOLIC BLOOD PRESSURE: 136 MMHG | DIASTOLIC BLOOD PRESSURE: 60 MMHG

## 2024-04-02 DIAGNOSIS — I48.20 CHRONIC ATRIAL FIBRILLATION (CMS/HCC): Chronic | ICD-10-CM

## 2024-04-02 DIAGNOSIS — Z95.1 S/P CABG (CORONARY ARTERY BYPASS GRAFT): Chronic | ICD-10-CM

## 2024-04-02 DIAGNOSIS — Z95.0 PACEMAKER: ICD-10-CM

## 2024-04-02 DIAGNOSIS — Z95.2 S/P TAVR (TRANSCATHETER AORTIC VALVE REPLACEMENT): Primary | ICD-10-CM

## 2024-04-02 DIAGNOSIS — Z95.2 S/P TAVR (TRANSCATHETER AORTIC VALVE REPLACEMENT): Primary | Chronic | ICD-10-CM

## 2024-04-02 PROBLEM — R33.9 URINARY RETENTION: Status: ACTIVE | Noted: 2024-04-02

## 2024-04-02 PROCEDURE — 93000 ELECTROCARDIOGRAM COMPLETE: CPT | Performed by: INTERNAL MEDICINE

## 2024-04-02 PROCEDURE — 99213 OFFICE O/P EST LOW 20 MIN: CPT | Performed by: INTERNAL MEDICINE

## 2024-04-02 PROCEDURE — 99214 OFFICE O/P EST MOD 30 MIN: CPT | Performed by: INTERNAL MEDICINE

## 2024-04-02 ASSESSMENT — ENCOUNTER SYMPTOMS
FEVER: 0
SLEEP DISTURBANCES DUE TO BREATHING: 0
DIZZINESS: 0
ALLERGIC/IMMUNOLOGIC NEGATIVE: 1
WEIGHT GAIN: 0
LIGHT-HEADEDNESS: 0
BLOATING: 0
HEMATURIA: 0
SYNCOPE: 1
BLURRED VISION: 0
WEAKNESS: 0
NEAR-SYNCOPE: 1
ENDOCRINE NEGATIVE: 1
COUGH: 0
PND: 0
CHANGE IN BOWEL HABIT: 0
BRUISES/BLEEDS EASILY: 0
BACK PAIN: 1
FLANK PAIN: 0
PSYCHIATRIC NEGATIVE: 1
DECREASED APPETITE: 0
HEARTBURN: 0
WEIGHT LOSS: 0
ORTHOPNEA: 0

## 2024-04-02 NOTE — ASSESSMENT & PLAN NOTE
He has chronic asymptomatic rate controlled atrial fibrillation.  He is anticoagulated on Eliquis 2.5 mg twice daily.

## 2024-04-02 NOTE — PROGRESS NOTES
"   Cardiology Note       Reason for visit: Scheduled cardiology follow-up    Chief Complaint   Patient presents with    S/P TAVR (transcatheter aortic valve replacement)   Permanent pacemaker  Obstructive sleep apnea  Systemic hypertension  Coronary artery disease status post coronary bypass graft surgery.     HPI   Cain Pruitt is a 89 y.o. male who presents for cardiac follow-up.  He is accompanied by family.    He was last seen in this office 6 months ago.  He denies anginal chest pain.  He has not had symptoms of PND or orthopnea.  He denies palpitations or lightheadedness.      He does report some decline in his exercise tolerance just because he feels fatigued.  He does not have exertional chest pain or palpitations or other cardiovascular complaint.    Good appetite and stable weight.  No new medications.  He had some bleeding evident after dermatologic surgery but otherwise no signs or symptoms of bleeding.  No fever or chills or signs of infection.      Past Medical History:   Diagnosis Date    A-fib (CMS/HCC)     Arthritis     b/l knees    BPH (benign prostatic hyperplasia)     CAD (coronary artery disease)     4 stents placed since 1991    Cancer of cecum (CMS/HCC) 2011    S/p resection 2011    Cecum cancer (CMS/HCC)     12/2/2011 no chemo or XRT.     Colon polyps     Coronary artery disease 1991    s/p cabg     Decreased cardiac ejection fraction 1/24/2019    Edema     Erectile dysfunction     GI (gastrointestinal bleed)     GI (gastrointestinal bleed) 06/2016    Heart block 1/24/2019    History of percutaneous coronary intervention 8/12/2019    BMS to SVG to OM 2007    History of transfusion 2011    during colon resection had \"nicked artery\"- required multiple unitos pf blood     Hyperlipidemia     Hypertension     Hypotension     Lumbar herniated disc     SIENA (obstructive sleep apnea) 8/12/2019    Osteomyelitis of toe of right foot (CMS/[facility]) 2016    s/p 2nd and 3rd toe osteomylitis     Pacemaker " 1/24/2019    PAD (peripheral artery disease) (CMS/[facility]) 8/12/2019    Peripheral neuropathy     tips of fingers  and feet     S/P CABG (coronary artery bypass graft) 1991 1991 LIMA to LAD, ARTURO to RDA, SVG to OM    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    Transfemoral TAVR #29 Evolut Pro on 11-    Sleep apnea     Syncope     2008,2011    Type 2 diabetes mellitus (CMS/[facility])     last HgBA1C was 7.2 in 7/2019 managed by PCP Dr Aguilar Phan    Urinary retention     self cath's 3 times per day,     Vitamin D deficiency      Past Surgical History:   Procedure Laterality Date    AMPUTATION FOOT / TOE Right 2016    2nd and 3rd right foot from osteomylitis     BOWEL RESECTION  2011    CARDIAC PACEMAKER PLACEMENT  2013    CARDIAC SURGERY      CARDIOVERSION  2008    CORONARY ARTERY BYPASS GRAFT  1991    x 4 bypasses     CORONARY STENT PLACEMENT      3 stents    KNEE SURGERY Right 1951    MOHS SURGERY      SKIN BIOPSY       Social History     Socioeconomic History    Marital status:    Tobacco Use    Smoking status: Never    Smokeless tobacco: Never   Vaping Use    Vaping Use: Never used   Substance and Sexual Activity    Alcohol use: Yes     Comment: occassionally/social    Drug use: Defer    Sexual activity: Defer     Family History   Problem Relation Age of Onset    Clotting disorder Biological Mother     Leukemia Biological Father      Patient has no known allergies.  Current Outpatient Medications   Medication Sig Dispense Refill    apixaban (ELIQUIS) 5 mg tablet Take 0.5 tablets (2.5 mg total) by mouth 2 (two) times a day. To stop 2 days pre-op      aspirin 81 mg enteric coated tablet Take 1 tablet (81 mg total) by mouth daily. 30 tablet 0    bumetanide (BUMEX) 0.5 mg tablet Take 0.5 mg by mouth every morning.        cholecalciferol, vitamin D3, 1,000 unit (25 mcg) tablet Take 1,000 Units by mouth daily.        cyanocobalamin (VITAMIN B12) 1,000 mcg tablet Take 1,000 mcg by mouth  daily.      dutasteride (AVODART) 0.5 mg capsule Take 0.5 mg by mouth nightly.    0    ezetimibe 10 mg tablet take 1 tablet by mouth NIGHTLY 90 tablet 3    ferrous sulfate 325 mg (65 mg iron) tablet Take 65 mg by mouth every other day.        ipratropium (ATROVENT) 0.03 % nasal spray Administer 0.03 % into affected nostril(s) as needed. 2 sprays by intranasal route 2-3 times every day in each nostril       LACTOBACILLUS ACIDOPHILUS (PROBIOTIC ORAL) Take by mouth daily.      lisinopriL (PRINIVIL) 10 mg tablet Take 1 tablet (10 mg total) by mouth daily. 90 tablet 3    metFORMIN (GLUCOPHAGE) 500 mg tablet Take 500 mg by mouth daily with breakfast.        metoprolol succinate XL (TOPROL-XL) 50 mg 24 hr tablet Take 50 mg by mouth nightly.        omeprazole (PriLOSEC) 20 mg capsule Take 20 mg by mouth once.      potassium chloride (MICRO-K) 10 mEq CR capsule Take 10 mEq by mouth every other day.        semaglutide (OZEMPIC) 0.25 mg or 0.5 mg(2 mg/1.5 mL) subcutaneous injection Inject 0.5 mg under the skin once a week. Once      silodosin (RAPAFLO) 8 mg capsule Take 8 mg by mouth nightly.         No current facility-administered medications for this visit.       Review of Systems   Constitutional: Negative for decreased appetite, fever, weight gain and weight loss.   HENT:  Negative for congestion and nosebleeds.    Eyes:  Negative for blurred vision and visual disturbance.   Cardiovascular:  Positive for near-syncope and syncope. Negative for chest pain, leg swelling, orthopnea and paroxysmal nocturnal dyspnea.   Respiratory:  Negative for cough and sleep disturbances due to breathing.    Endocrine: Negative.    Hematologic/Lymphatic: Negative for bleeding problem. Does not bruise/bleed easily.   Musculoskeletal:  Positive for back pain. Negative for arthritis and joint pain.   Gastrointestinal:  Positive for melena. Negative for bloating, change in bowel habit and heartburn.   Genitourinary:  Negative for flank pain and  hematuria.   Neurological:  Negative for dizziness, light-headedness and weakness.   Psychiatric/Behavioral: Negative.     Allergic/Immunologic: Negative.      Objective    Weight 216 pounds  Blood pressure seated position standard cuff right arm 132/64.  The pulse regular with occasional extrasystole at 68 bpm.  Resting room air oxygen saturation 98%.  Respiratory rate 16/min nonlabored.    Physical Exam:    General Appearance:  Alert, no distress.  In a wheelchair.   Head:  Normocephalic, without obvious abnormality, atraumatic   Eyes:  Conjunctiva/corneas clear, EOM's intact   Neck: No thyroid enlargement. No JVD. No bruits    Lungs:   Clear to auscultation bilaterally, respirations unlabored, no rales, no wheezing   Heart:  Regular rhythm, S1 and S2 normal,  Systolic ejection murmur, rub or gallop.  Single extrasystole heard.  No obvious diastolic murmur heard at rest or provoked with maneuver.   Abdomen:   Soft, non-tender, no masses, no organomegaly   Vascular: Pulses 2+ and symmetric all extremities, no carotid bruit or jugular vein distention   Musculoskeletal:  Skin: No injury or deformity  Scattered solar and seborrheic keratoses about the upper extremities and face.   Extremities: Extremities normal, atraumatic, pulses normal   Behavior/Emotional:  Neurologic: Appropriate, cooperative  No gross neurologic abnormalities.       Lab Results   Component Value Date    WBC 9.66 11/28/2019    HGB 12.6 (L) 11/28/2019     11/28/2019    CHOL 183 12/20/2017    TRIG 248 (H) 12/20/2017    HDL 42 (L) 12/20/2017    ALT 24 11/13/2019    AST 20 11/13/2019     11/28/2019    K 4.4 08/09/2023    CREATININE 1.2 11/28/2019    TSH 2.00 07/23/2016    INR 1.3 11/27/2019    HGBA1C 8.3 (H) 04/18/2022      ECG      Transthoracic echo March 16, 2023:  Interpretation Summary         Left Ventricle: Normal ventricle size. Mild concentric left ventricular hypertrophy. Low normal systolic function. Estimated EF 50-55%.  Abnormal septal motion consistent with right ventricular pacemaker. Unable to assess diastolic filling pattern.    Right Ventricle: Normal ventricle size. Low normal systolic function.    Left Atrium: Moderately dilated atrium.    Right Atrium: Moderately dilated atrium.    Aortic Valve: The patient is s/p TAVR #29 Evolut Pro on 11-. The valve is not well visualized, but in limited views appears grossly normal. There is mild to moderate transvalvular regurgitation. Mild to moderate regurgitation with a centrally directed jet. Peak velocity = 1.88 m/s. Mean gradient = 8.00 mmHg.    Mitral Valve: Moderate calcification of the posterior leaflet. Normal leaflet motion. Mild mitral annular calcification. Mild regurgitation. No stenosis.    Tricuspid Valve: Normal structure. Mild regurgitation. Estimated RVSP = 37 mmHg.    Pulmonic Valve: Grossly normal structure. Trace regurgitation. No stenosis.    Pericardium: No evidence of pericardial effusion.    IVC/SVC: Inferior vena cava is a small caliber vessel (<1.7cm). Inferior vena cava collapses >50% during inspiration.    Aorta: Sinuses of Valsalva normal-sized. Aortic arch normal-sized. Mild dilatation of the ascending aorta at 4.3 cm.    Prior Study: Prior study available for comparison. Prior study date: 09/07/2021. Changes noted compared to prior study.  The ejection fraction is in the low normal range on the current study.  Gradients across the TAVR valve are similar.  The ascending aorta is mildly dilated on the current study.     ASSESSMENT/PLAN    SIENA (obstructive sleep apnea)  He does not use CPAP.     Atrial fibrillation (CMS/HCC)/Heart block/status post permanent pacemaker.  He follows up with Dr. Meyer. He continues on rate control agents and anticoagulation long-term.    S/P TAVR (transcatheter aortic valve replacement)  Transfemoral TAVR #29 Evolut Pro on 11-.  No exam evidence of volume overload.  No exam evidence of valvular insufficiency.   An echo performed March 2023 demonstrating appropriate position and stable function of the valve prosthesis.    Coronary artery disease involving native coronary artery without angina pectoris/S/P CABG (coronary artery bypass graft)/CHF (congestive heart failure), NYHA class III, acute on chronic, combined (CMS/[facility])  1991 LIMA to LAD, ARTURO to RDA, SVG to OM. He has no obvious angina or heart failure.  He will remain on his current medications.       Hypertension  Under fair control today on his current medications.  Sodium limitation in his diet was emphasized.     Type 2 diabetes mellitus (CMS/[facility])  He is on oral therapy and follows up with his primary care physician regarding his diabetes.  Tolerating Ozempic without side effect with an improvement in his hemoglobin A1c.     Hyperlipidemia  He is on lipid-lowering therapy which he tolerates.      No obvious changes in cardiovascular exam.  No recommended changes to medications from my perspective today.  No limits on activity.  Follow-up in 6 months.     Efe Mejia MD  4/2/2024

## 2024-04-02 NOTE — LETTER
April 2, 2024     Aguilar Phan MD  [address] [facility] [address]    Patient: Cain Pruitt  YOB: 1932  Date of Visit: 4/2/2024      Dear Dr. Phan:    Thank you for referring Cain Pruitt to me for evaluation. Below are my notes for this consultation.    If you have questions, please do not hesitate to call me. I look forward to following your patient along with you.         Sincerely,        Efe Mejia MD        CC: ROSALIO Parish MD Scott M Goldman, MD Steven A Rothman, MD Coady, Paul M, MD  4/2/2024  1:05 PM  Sign when Signing Visit     Cardiology Note       Reason for visit: Scheduled cardiology follow-up    Chief Complaint   Patient presents with   • S/P TAVR (transcatheter aortic valve replacement)   Permanent pacemaker  Obstructive sleep apnea  Systemic hypertension  Coronary artery disease status post coronary bypass graft surgery.     HPI   Cain Pruitt is a 89 y.o. male who presents for cardiac follow-up.  He is accompanied by family.    He was last seen in this office 6 months ago.  He denies anginal chest pain.  He has not had symptoms of PND or orthopnea.  He denies palpitations or lightheadedness.      He does report some decline in his exercise tolerance just because he feels fatigued.  He does not have exertional chest pain or palpitations or other cardiovascular complaint.    Good appetite and stable weight.  No new medications.  He had some bleeding evident after dermatologic surgery but otherwise no signs or symptoms of bleeding.  No fever or chills or signs of infection.      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    • Colon polyps    • Coronary artery  "disease 1991    s/p cabg    • Decreased cardiac ejection fraction 1/24/2019   • Edema    • Erectile dysfunction    • GI (gastrointestinal bleed)    • GI (gastrointestinal bleed) 06/2016   • Heart block 1/24/2019   • History of percutaneous coronary intervention 8/12/2019    BMS to SVG to OM 2007   • History of transfusion 2011    during colon resection had \"nicked artery\"- required multiple unitos pf blood    • Hyperlipidemia    • Hypertension    • Hypotension    • Lumbar herniated disc    • SIENA (obstructive sleep apnea) 8/12/2019   • Osteomyelitis of toe of right foot (CMS/[facility]) 2016    s/p 2nd and 3rd toe osteomylitis    • Pacemaker 1/24/2019   • PAD (peripheral artery disease) (CMS/[facility]) 8/12/2019   • Peripheral neuropathy     tips of fingers  and feet    • S/P CABG (coronary artery bypass graft) 1991 1991 LIMA to LAD, ARTURO to RDA, SVG to OM   • 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    Transfemoral TAVR #29 Evolut Pro on 11-   • Sleep apnea    • Syncope     2008,2011   • Type 2 diabetes mellitus (CMS/[facility])     last HgBA1C was 7.2 in 7/2019 managed by PCP Dr Aguilar Phan   • Urinary retention     self cath's 3 times per day,    • Vitamin D deficiency      Past Surgical History:   Procedure Laterality Date   • AMPUTATION FOOT / TOE Right 2016    2nd and 3rd right foot from osteomylitis    • BOWEL RESECTION  2011   • CARDIAC PACEMAKER PLACEMENT  2013   • CARDIAC SURGERY     • CARDIOVERSION  2008   • CORONARY ARTERY BYPASS GRAFT  1991    x 4 bypasses    • CORONARY STENT PLACEMENT      3 stents   • KNEE SURGERY Right 1951   • MOHS SURGERY     • SKIN BIOPSY       Social History     Socioeconomic History   • Marital status:    Tobacco Use   • Smoking status: Never   • Smokeless tobacco: Never   Vaping Use   • Vaping Use: Never used   Substance and Sexual Activity   • Alcohol use: Yes     Comment: occassionally/social   • Drug use: Defer   • Sexual activity: Defer     Family History "   Problem Relation Age of Onset   • Clotting disorder Biological Mother    • Leukemia Biological Father      Patient has no known allergies.  Current Outpatient Medications   Medication Sig Dispense Refill   • apixaban (ELIQUIS) 5 mg tablet Take 0.5 tablets (2.5 mg total) by mouth 2 (two) times a day. To stop 2 days pre-op     • aspirin 81 mg enteric coated tablet Take 1 tablet (81 mg total) by mouth daily. 30 tablet 0   • bumetanide (BUMEX) 0.5 mg tablet Take 0.5 mg by mouth every morning.       • cholecalciferol, vitamin D3, 1,000 unit (25 mcg) tablet Take 1,000 Units by mouth daily.       • cyanocobalamin (VITAMIN B12) 1,000 mcg tablet Take 1,000 mcg by mouth daily.     • dutasteride (AVODART) 0.5 mg capsule Take 0.5 mg by mouth nightly.    0   • ezetimibe 10 mg tablet take 1 tablet by mouth NIGHTLY 90 tablet 3   • ferrous sulfate 325 mg (65 mg iron) tablet Take 65 mg by mouth every other day.       • ipratropium (ATROVENT) 0.03 % nasal spray Administer 0.03 % into affected nostril(s) as needed. 2 sprays by intranasal route 2-3 times every day in each nostril      • LACTOBACILLUS ACIDOPHILUS (PROBIOTIC ORAL) Take by mouth daily.     • lisinopriL (PRINIVIL) 10 mg tablet Take 1 tablet (10 mg total) by mouth daily. 90 tablet 3   • metFORMIN (GLUCOPHAGE) 500 mg tablet Take 500 mg by mouth daily with breakfast.       • metoprolol succinate XL (TOPROL-XL) 50 mg 24 hr tablet Take 50 mg by mouth nightly.       • omeprazole (PriLOSEC) 20 mg capsule Take 20 mg by mouth once.     • potassium chloride (MICRO-K) 10 mEq CR capsule Take 10 mEq by mouth every other day.       • semaglutide (OZEMPIC) 0.25 mg or 0.5 mg(2 mg/1.5 mL) subcutaneous injection Inject 0.5 mg under the skin once a week. Once     • silodosin (RAPAFLO) 8 mg capsule Take 8 mg by mouth nightly.         No current facility-administered medications for this visit.       Review of Systems   Constitutional: Negative for decreased appetite, fever, weight gain and  weight loss.   HENT:  Negative for congestion and nosebleeds.    Eyes:  Negative for blurred vision and visual disturbance.   Cardiovascular:  Positive for near-syncope and syncope. Negative for chest pain, leg swelling, orthopnea and paroxysmal nocturnal dyspnea.   Respiratory:  Negative for cough and sleep disturbances due to breathing.    Endocrine: Negative.    Hematologic/Lymphatic: Negative for bleeding problem. Does not bruise/bleed easily.   Musculoskeletal:  Positive for back pain. Negative for arthritis and joint pain.   Gastrointestinal:  Positive for melena. Negative for bloating, change in bowel habit and heartburn.   Genitourinary:  Negative for flank pain and hematuria.   Neurological:  Negative for dizziness, light-headedness and weakness.   Psychiatric/Behavioral: Negative.     Allergic/Immunologic: Negative.      Objective    Weight 216 pounds  Blood pressure seated position standard cuff right arm 132/64.  The pulse regular with occasional extrasystole at 68 bpm.  Resting room air oxygen saturation 98%.  Respiratory rate 16/min nonlabored.    Physical Exam:    General Appearance:  Alert, no distress.  In a wheelchair.   Head:  Normocephalic, without obvious abnormality, atraumatic   Eyes:  Conjunctiva/corneas clear, EOM's intact   Neck: No thyroid enlargement. No JVD. No bruits    Lungs:   Clear to auscultation bilaterally, respirations unlabored, no rales, no wheezing   Heart:  Regular rhythm, S1 and S2 normal,  Systolic ejection murmur, rub or gallop.  Single extrasystole heard.  No obvious diastolic murmur heard at rest or provoked with maneuver.   Abdomen:   Soft, non-tender, no masses, no organomegaly   Vascular: Pulses 2+ and symmetric all extremities, no carotid bruit or jugular vein distention   Musculoskeletal:  Skin: No injury or deformity  Scattered solar and seborrheic keratoses about the upper extremities and face.   Extremities: Extremities normal, atraumatic, pulses normal    Behavior/Emotional:  Neurologic: Appropriate, cooperative  No gross neurologic abnormalities.       Lab Results   Component Value Date    WBC 9.66 11/28/2019    HGB 12.6 (L) 11/28/2019     11/28/2019    CHOL 183 12/20/2017    TRIG 248 (H) 12/20/2017    HDL 42 (L) 12/20/2017    ALT 24 11/13/2019    AST 20 11/13/2019     11/28/2019    K 4.4 08/09/2023    CREATININE 1.2 11/28/2019    TSH 2.00 07/23/2016    INR 1.3 11/27/2019    HGBA1C 8.3 (H) 04/18/2022      ECG      Transthoracic echo March 16, 2023:  Interpretation Summary       •  Left Ventricle: Normal ventricle size. Mild concentric left ventricular hypertrophy. Low normal systolic function. Estimated EF 50-55%. Abnormal septal motion consistent with right ventricular pacemaker. Unable to assess diastolic filling pattern.  •  Right Ventricle: Normal ventricle size. Low normal systolic function.  •  Left Atrium: Moderately dilated atrium.  •  Right Atrium: Moderately dilated atrium.  •  Aortic Valve: The patient is s/p TAVR #29 Evolut Pro on 11-. The valve is not well visualized, but in limited views appears grossly normal. There is mild to moderate transvalvular regurgitation. Mild to moderate regurgitation with a centrally directed jet. Peak velocity = 1.88 m/s. Mean gradient = 8.00 mmHg.  •  Mitral Valve: Moderate calcification of the posterior leaflet. Normal leaflet motion. Mild mitral annular calcification. Mild regurgitation. No stenosis.  •  Tricuspid Valve: Normal structure. Mild regurgitation. Estimated RVSP = 37 mmHg.  •  Pulmonic Valve: Grossly normal structure. Trace regurgitation. No stenosis.  •  Pericardium: No evidence of pericardial effusion.  •  IVC/SVC: Inferior vena cava is a small caliber vessel (<1.7cm). Inferior vena cava collapses >50% during inspiration.  •  Aorta: Sinuses of Valsalva normal-sized. Aortic arch normal-sized. Mild dilatation of the ascending aorta at 4.3 cm.  •  Prior Study: Prior study available for  comparison. Prior study date: 09/07/2021. Changes noted compared to prior study.  The ejection fraction is in the low normal range on the current study.  Gradients across the TAVR valve are similar.  The ascending aorta is mildly dilated on the current study.     ASSESSMENT/PLAN    SIENA (obstructive sleep apnea)  He does not use CPAP.     Atrial fibrillation (CMS/[facility])/Heart block/status post permanent pacemaker.  He follows up with Dr. Meyer. He continues on rate control agents and anticoagulation long-term.    S/P TAVR (transcatheter aortic valve replacement)  Transfemoral TAVR #29 Evolut Pro on 11-.  No exam evidence of volume overload.  No exam evidence of valvular insufficiency.  An echo performed March 2023 demonstrating appropriate position and stable function of the valve prosthesis.    Coronary artery disease involving native coronary artery without angina pectoris/S/P CABG (coronary artery bypass graft)/CHF (congestive heart failure), NYHA class III, acute on chronic, combined (CMS/[facility])  1991 LIMA to LAD, ARTURO to RDA, SVG to OM. He has no obvious angina or heart failure.  He will remain on his current medications.       Hypertension  Under fair control today on his current medications.  Sodium limitation in his diet was emphasized.     Type 2 diabetes mellitus (CMS/[facility])  He is on oral therapy and follows up with his primary care physician regarding his diabetes.  Tolerating Ozempic without side effect with an improvement in his hemoglobin A1c.     Hyperlipidemia  He is on lipid-lowering therapy which he tolerates.      No obvious changes in cardiovascular exam.  No recommended changes to medications from my perspective today.  No limits on activity.  Follow-up in 6 months.     Efe Mejia MD  4/2/2024

## 2024-04-02 NOTE — ASSESSMENT & PLAN NOTE
He has a history of CAD status post PCI intervention and CABG.  He has no anginal symptoms.  He was seen by Dr. Mejia earlier today

## 2024-04-02 NOTE — ASSESSMENT & PLAN NOTE
He is status post TAVR in 2019.  Last echo March 2023 showed an EF of 50 to 55% with mild to moderate transvalvular regurgitation.

## 2024-04-02 NOTE — PROGRESS NOTES
UROLOGY PROGRESS NOTE         NAME: Ivan Alcala  AGE: 89 y.o. SEX: male  : 3/13/1932   MRN: [id number]    DATE: 4/3/2024  TIME: 4:06 PM    Assessment and Plan      Impression:   1. Urinary retention  -     POCT urine dip auto non-scope  -     Urinalysis with microscopic; Future  -     Urine culture; Future    2. History of recurrent UTIs         Plan: Patient did have a positive urine culture on  which was not treated as yet.  This could be the source for the hematuria he noted.  He is on a blood thinner as well.  We are going to treat the positive culture with amoxicillin 500 mg 3 times daily for 1 week.  He will let us know if there are any issues with yeast infection symptoms or balanitis.  He has no abnormalities on his physical exam today.  The genitalia are normal.  He is uncircumcised there are no lesions or evidence of balanitis and the prostate is smaller about 2-3+ at most flat and smooth and there are no nodules or tenderness.    Our plan will be for him to have a repeat urinalysis about a week after he completes antibiotics.  If it is unremarkable and there is no hematuria of significance, we will observe.  If there is persistent microhematuria or other significant issue I would recommend further evaluation with renal sonogram and possibly flexible cystoscopy.  I discussed with he and his son today.      Chief Complaint     Chief Complaint   Patient presents with   • New Patient Visit     Patient here for a recent urine had hematuria. Patient's most recent urinalysis is in the chart from [facility] on 3/22/24.      History of Present Illness     HPI: Ivan Alcala is a 89 y.o. year old male who presents with a history of urinary retention due to diabetic neuropathy and bladder hypotonia as well as BPH.  I last saw the patient in  [place].  His medication list includes Avodart as well as silodosin.  He had been on self intermittent cath in the past.  Recent hematuria.  He is self  cathing twice daily.  He also was able to void on his own.  He has about 3 UTIs a year.  The gross hematuria is new however.  He is on blood thinner related to heart issues and valve replacement.  He states that he voids about 4 ounces at a time.  His cast for residual are about 4 ounces as well.  It does not allow him to sleep through the night.  He has noted issues with self cath.              The following portions of the patient's history were reviewed and updated as appropriate: allergies, current medications, past family history, past medical history, past social history, past surgical history and problem list.  Past Medical History:   Diagnosis Date   • Aortic stenosis    • Atherosclerotic heart disease of native coronary artery without angina pectoris    • BPH with obstruction/lower urinary tract symptoms    • Carcinoma, basal cell, skin    • Chronic systolic (congestive) heart failure ([facility])    • Diabetic neuropathy ([facility])    • Gastric AVM    • Gastrointestinal hemorrhage with melena    • GI bleed 07/23/2016   • 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, unspecified CKD stage ([facility])     Stage 3a chronic kidney disease   • Kidney disease, chronic, stage III (GFR 30-59 ml/min) ([facility])    • Left ventricular ejection fraction less than 40%    • Murmur, cardiac    • Normocytic anemia    • Osteomyelitis ([facility]) 04/04/2017   • Paroxysmal atrial fibrillation ([facility])    • Rhinorrhea    • Type 2 diabetes mellitus ([facility])      Past Surgical History:   Procedure Laterality Date   • AMPUTATION  08/12/2020    left toe   • AORTIC VALVE REPLACEMENT  2019    Open femoral   • BACK SURGERY      lower back lumbar disc removed   • CARDIAC PACEMAKER PLACEMENT  10/2013   • COLECTOMY  01/01/2012    Partial removal of colon   • COLONOSCOPY  12/04/2012   • CORONARY ARTERY BYPASS GRAFT  01/01/1994   • EGD  02/17/2023   • FL GUIDED NEEDLE PLAC BX/ASP/INJ  03/10/2017   • FOOT SURGERY Right  "11/11/2015    Remove foreign body   • MEDIPORT INSERTION, SINGLE     • TOE AMPUTATION Right 03/31/2017 7/5/2018     shoulder  Review of Systems     Const: Denies chills, fever and weight loss.  CV: Denies chest pain.  Resp: Denies SOB.  GI: Denies abdominal pain, nausea and vomiting.  : Denies symptoms other than stated above.  Musculo: Denies back pain.    Objective   /60   Pulse 66   Temp (!) 97 °F (36.1 °C) (Tympanic)   Resp 18   Ht 6' 2\" (1.88 m)   Wt 98 kg (216 lb)   SpO2 98%   BMI 27.73 kg/m²     Physical Exam  Const: Appears healthy and well developed. No signs of acute distress present.  Resp: Respirations are regular and unlabored.   CV: Rate is regular. Rhythm is regular.  Abdomen: Abdomen is soft, nontender, and nondistended. Kidneys are not palpable.  : External genitalia normal foreskin normal retractable.  Uncircumcised no balanitis presently.  Testes descended and normal no tenderness no hernia.  Prostate is 2-3+ flat and smooth and there are no nodules hard areas or tenderness.  No gross blood in the stool.  Psych: Patient's attitude is cooperative. Mood is normal. Affect is normal.    Procedure   Procedures     Current Medications     Current Outpatient Medications:   •  aspirin (Aspirin 81) 81 mg chewable tablet, Chew 81 mg daily, Disp: , Rfl:   •  bumetanide (BUMEX) 0.5 MG tablet, Take 0.5 mg by mouth daily, Disp: , Rfl:   •  Cholecalciferol 25 MCG (1000 UT) capsule, Take 1,000 Units by mouth daily, Disp: , Rfl:   •  Cyanocobalamin 1000 MCG CAPS, Take 1,000 mcg by mouth daily, Disp: , Rfl:   •  dutasteride (AVODART) 0.5 mg capsule, Take 0.5 mg by mouth daily, Disp: , Rfl:   •  Eliquis 2.5 MG, Take 2.5 mg by mouth 2 (two) times a day, Disp: , Rfl:   •  ezetimibe (Zetia) 10 mg tablet, Take 10 mg by mouth daily, Disp: , Rfl:   •  ferrous sulfate 325 (65 FE) MG EC tablet, Take 65 mg by mouth every other day, Disp: , Rfl:   •  lisinopril (ZESTRIL) 10 mg tablet, Take 10 mg by mouth " daily, Disp: , Rfl:   •  metFORMIN (GLUCOPHAGE) 500 mg tablet, Take 500 mg by mouth daily with breakfast, Disp: , Rfl:   •  metoprolol succinate (TOPROL-XL) 50 mg 24 hr tablet, Take 50 mg by mouth daily, Disp: , Rfl:   •  omeprazole (PriLOSEC) 20 mg delayed release capsule, Take 20 mg by mouth daily, Disp: , Rfl:   •  Probiotic Product (Acidophilus) CHEW, Chew 1 tablet in the morning, Disp: , Rfl:   •  semaglutide, 0.25 or 0.5 mg/dose, (Ozempic, 0.25 or 0.5 MG/DOSE,) 2 mg/3 mL injection pen, Inject 0.5 mg under the skin every 7 days, Disp: , Rfl:   •  Silodosin 8 MG CAPS, Take 8 mg by mouth daily, Disp: , Rfl:         Mariana Henao MD

## 2024-04-02 NOTE — ASSESSMENT & PLAN NOTE
He has a normally functioning [employer]ronik single-chamber pacemaker.  Battery longevity is 8 years 6 months until [employer].  Mode is VVI-CLS with a base rate of 60.  The RV lead measures and R wave of 19.1 mV, a lead impedance of 585 ohms, and a pacing threshold of 1.6 V at 0.4 ms.  There is V pacing 91% of the time.  No changes made to the permanent programming of this device.

## 2024-04-03 ENCOUNTER — NURSE TRIAGE (OUTPATIENT)
Dept: OTHER | Facility: OTHER | Age: 89
End: 2024-04-03

## 2024-04-03 ENCOUNTER — OFFICE VISIT (OUTPATIENT)
Dept: UROLOGY | Facility: CLINIC | Age: 89
End: 2024-04-03
Payer: MEDICARE

## 2024-04-03 ENCOUNTER — NURSE TRIAGE (OUTPATIENT)
Age: 89
End: 2024-04-03

## 2024-04-03 VITALS
SYSTOLIC BLOOD PRESSURE: 122 MMHG | HEIGHT: 74 IN | WEIGHT: 216 LBS | DIASTOLIC BLOOD PRESSURE: 60 MMHG | BODY MASS INDEX: 27.72 KG/M2 | HEART RATE: 66 BPM | RESPIRATION RATE: 18 BRPM | OXYGEN SATURATION: 98 % | TEMPERATURE: 97 F

## 2024-04-03 DIAGNOSIS — Z87.440 HISTORY OF RECURRENT UTIS: ICD-10-CM

## 2024-04-03 DIAGNOSIS — R33.9 URINARY RETENTION: Primary | ICD-10-CM

## 2024-04-03 DIAGNOSIS — Z87.440 HISTORY OF RECURRENT UTIS: Primary | ICD-10-CM

## 2024-04-03 LAB
SL AMB  POCT GLUCOSE, UA: ABNORMAL
SL AMB LEUKOCYTE ESTERASE,UA: ABNORMAL
SL AMB POCT BILIRUBIN,UA: ABNORMAL
SL AMB POCT BLOOD,UA: ABNORMAL
SL AMB POCT CLARITY,UA: ABNORMAL
SL AMB POCT COLOR,UA: ABNORMAL
SL AMB POCT KETONES,UA: ABNORMAL
SL AMB POCT NITRITE,UA: ABNORMAL
SL AMB POCT PH,UA: 5.5
SL AMB POCT SPECIFIC GRAVITY,UA: 1.02
SL AMB POCT URINE PROTEIN: ABNORMAL
SL AMB POCT UROBILINOGEN: 0.2

## 2024-04-03 PROCEDURE — 99213 OFFICE O/P EST LOW 20 MIN: CPT | Performed by: UROLOGY

## 2024-04-03 PROCEDURE — 81003 URINALYSIS AUTO W/O SCOPE: CPT | Performed by: UROLOGY

## 2024-04-03 RX ORDER — ASPIRIN 81 MG/1
81 TABLET, CHEWABLE ORAL DAILY
COMMUNITY

## 2024-04-03 RX ORDER — BUMETANIDE 0.5 MG/1
0.5 TABLET ORAL DAILY
COMMUNITY
Start: 2024-01-04

## 2024-04-03 RX ORDER — OMEPRAZOLE 20 MG/1
20 CAPSULE, DELAYED RELEASE ORAL DAILY
COMMUNITY
Start: 2024-01-15

## 2024-04-03 RX ORDER — APIXABAN 2.5 MG/1
2.5 TABLET, FILM COATED ORAL 2 TIMES DAILY
COMMUNITY
Start: 2024-03-13

## 2024-04-03 RX ORDER — AMOXICILLIN 500 MG/1
500 CAPSULE ORAL EVERY 8 HOURS SCHEDULED
Qty: 21 CAPSULE | Refills: 0 | Status: SHIPPED | OUTPATIENT
Start: 2024-04-03 | End: 2024-04-10

## 2024-04-03 NOTE — TELEPHONE ENCOUNTER
"Regarding: antibiotic not sent to pharmacy / UTI  ----- Message from Helen Toussaint sent at 4/3/2024  7:18 PM EDT -----  \"My father was at the doctor today and he was going to send keflex or ampicillin to the pharmacy but they don't have a prescription\"    "

## 2024-04-03 NOTE — TELEPHONE ENCOUNTER
Patient's son called and stated Dr. Henao stated he would prescribe him ABX today when they were there for office visit but pharmacy has no record of the Rx. [id number] - [place] - [address] [place] [address] . Please advise.

## 2024-04-03 NOTE — PATIENT INSTRUCTIONS
Please complete your course of antibiotics and were going to check a repeat urinalysis.  We should have a phone visit about a week to 2 weeks after the antibiotics are completed.

## 2024-04-03 NOTE — TELEPHONE ENCOUNTER
"Reason for Disposition  • [1] Prescription prescribed recently is not at pharmacy AND [2] triager has access to patient's EMR AND [3] prescription is recorded in the EMR    Answer Assessment - Initial Assessment Questions  1. NAME of MEDICATION: \"What medicine are you calling about?\"      Amoxicillin 500 mg 3 times daily    2. QUESTION: \"What is your question?\" (e.g., medication refill, side effect)      Patient's father seen Dr. Henao today in the office and was supposed to be prescribed amoxicillin, but the pharmacy has not received the prescription.    Reviewed Dr. Henao's notes from office visit today which reflectsthat patient is to be started on amoxicillin:    As per Dr. Henao's note-  \"We are going to treat the positive culture with amoxicillin 500 mg 3 times daily for 1 week.\"    Prescription sent to preferred pharmacy; verified confirmation receipt by pharmacy.  Informed patient's son of this information.  He was appreciative.    Protocols used: Medication Question Call-ADULT-    "

## 2024-04-05 ENCOUNTER — TELEPHONE (OUTPATIENT)
Dept: CARDIOLOGY | Facility: CLINIC | Age: 89
End: 2024-04-05
Payer: MEDICARE

## 2024-04-05 NOTE — TELEPHONE ENCOUNTER
"Reviewed in [place] and I contacted [employer]technical services. Auto threshold is disabled. According to technical services, this occurred because there were \"25 loss of capture\" during the algorithm for RV capture control. Patient will remain pacing at output 3.0 V at 0.4 msec. Patient will need to come into office to have the RV capture control reprogrammed on. All other lead parameters are stable.     Moriah/Rhea, you just saw patient in the office on 04/02/24 and device was working well, with RV threshold of 1.6 V. Output will be set at 3.0 V. Since device was working normally, I don't know that it is necessary to have patient rechecked in the office, unless you feel strongly about turning \"RV capture control\" back on, or rechecking manual threshold. Please advise. TY.  "

## 2024-04-05 NOTE — TELEPHONE ENCOUNTER
[employer] Pacemaker Report  Monitoring period: 4/2/24 to 4/5/24    Dr. Betsy Mejia    DOT:4/5/24; RV capture control is disabled since 4/5/24 escalated to follow up tab (Yellow 1) on April 5th, 2024.        Addendum:  Mr. Das's next  appointment with Dr. Meyer and   Dr. Mejia on 10/8/24.    Pt. has a history of TAVR and CABG.    Pt. medications include Eliquis and Metoprolol.

## 2024-04-17 PROBLEM — R31.0 GROSS HEMATURIA: Status: ACTIVE | Noted: 2024-04-17

## 2024-04-18 ENCOUNTER — TELEMEDICINE (OUTPATIENT)
Dept: UROLOGY | Facility: CLINIC | Age: 89
End: 2024-04-18
Payer: MEDICARE

## 2024-04-18 DIAGNOSIS — Z87.440 HISTORY OF RECURRENT UTIS: Primary | ICD-10-CM

## 2024-04-18 DIAGNOSIS — R31.0 GROSS HEMATURIA: ICD-10-CM

## 2024-04-18 PROCEDURE — 99441 PR PHYS/QHP TELEPHONE EVALUATION 5-10 MIN: CPT | Performed by: UROLOGY

## 2024-04-18 RX ORDER — IPRATROPIUM BROMIDE 42 UG/1
1 SPRAY, METERED NASAL 3 TIMES DAILY PRN
COMMUNITY
Start: 2024-04-17

## 2024-04-18 NOTE — PROGRESS NOTES
UROLOGY PROGRESS NOTE         NAME: Ivan Alcala  AGE: 89 y.o. SEX: male  : 3/13/1932   MRN: [id number]    DATE: 2024  TIME: 12:29 PM    Assessment and Plan      Impression:   1. History of recurrent UTIs  -     US kidney and bladder with pvr; Future; Expected date: 2024    2. Gross hematuria  -     US kidney and bladder with pvr; Future; Expected date: 2024         Plan: Patient's gross hematuria has resolved.  He took his full course of amoxicillin for positive previous culture.  We did get a repeat culture but this is pending.  This was done on April 15.  He is agreeable to an ultrasound of the kidneys and bladder.  We await the repeat UA and reflex culture.  The above imaging and the urine studies will help direct us.  Cystoscopy can be performed if needed.  He does note more bothersome frequency recently.  If he caths before bedtime he typically can sleep through the night but occasionally will have an incontinent episode overnight.      Chief Complaint     Chief Complaint   Patient presents with   • Follow-up     History of Present Illness     HPI: Ivan Alcala is a 89 y.o. year old male who presents with hematuria or UTIs.  Patient is on a blood thinner and perform self intermittent cath for issues with retention.  He has had problems with recurrent UTIs in the past. On  was placed on amoxicillin for a positive urine culture.   Currently a repeat urine culture was performed a few days ago.  We are trying to get those results.  Phone call visit to discuss his progress and determine whether or not further evaluation is needed.    Patient states that he is back to normal with voiding and performance of self cath as he had been.  He again notes more bothersome frequency and rare overnight incontinence.  Has been come more bothersome and a quality-of-life issue.    This is a phone visit today.  The patient was at home with his son William I spoke with both of them by phone.  I  received permission to treat by phone.  The call was carried out behind close doors for confidentiality.  The patient is at home in [place].  Total time of the visit was less than 5 minutes.    No charge for phone visit today              The following portions of the patient's history were reviewed and updated as appropriate: allergies, current medications, past family history, past medical history, past social history, past surgical history and problem list.  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, unspecified CKD stage ([facility])     Stage 3a chronic kidney disease   • Kidney disease, chronic, stage III (GFR 30-59 ml/min) ([facility])    • Left ventricular ejection fraction less than 40%    • Murmur, cardiac    • Normocytic anemia    • Osteomyelitis ([facility]) 04/04/2017   • Paroxysmal atrial fibrillation ([facility])    • Rhinorrhea    • Type 2 diabetes mellitus ([facility])      Past Surgical History:   Procedure Laterality Date   • AMPUTATION  08/12/2020    left toe   • AORTIC VALVE REPLACEMENT  2019    Open femoral   • BACK SURGERY      lower back lumbar disc removed   • CARDIAC PACEMAKER PLACEMENT  10/2013   • COLECTOMY  01/01/2012    Partial removal of colon   • COLONOSCOPY  12/04/2012   • CORONARY ARTERY BYPASS GRAFT  01/01/1994   • EGD  02/17/2023   • FL GUIDED NEEDLE PLAC BX/ASP/INJ  03/10/2017   • FOOT SURGERY Right 11/11/2015    Remove foreign body   • MEDIPORT INSERTION, SINGLE     • MOHS SURGERY Left 04/08/2024    Ear   • TOE AMPUTATION Right 03/31/2017 7/5/2018      shoulder  Review of Systems     Const: Denies chills, fever and weight loss.  CV: Denies chest pain.  Resp: Denies SOB.  GI: Denies abdominal pain, nausea and vomiting.  : Denies symptoms other than stated above.  Musculo: Denies back pain.    Objective   There were no vitals taken for this visit.        Procedure   Procedures     Current Medications     Current Outpatient Medications:   •  aspirin (Aspirin 81) 81 mg chewable tablet, Chew 81 mg daily, Disp: , Rfl:   •  bumetanide (BUMEX) 0.5 MG tablet, Take 0.5 mg by mouth daily, Disp: , Rfl:   •  Cholecalciferol 25 MCG (1000 UT) capsule, Take 1,000 Units by mouth daily, Disp: , Rfl:   •  Cyanocobalamin 1000 MCG CAPS, Take 1,000 mcg by mouth daily, Disp: , Rfl:   •  dutasteride (AVODART) 0.5 mg capsule, Take 0.5 mg by mouth daily, Disp: , Rfl:   •  Eliquis 2.5 MG, Take 2.5 mg by mouth 2 (two) times a day, Disp: , Rfl:   •  ezetimibe (Zetia) 10 mg tablet, Take 10 mg by mouth daily, Disp: , Rfl:   •  ferrous sulfate 325 (65 FE) MG EC tablet, Take 65 mg by mouth every other day, Disp: , Rfl:   •  ipratropium (ATROVENT) 0.06 % nasal spray, 1 spray into each nostril 3 (three) times a day as needed, Disp: , Rfl:   •  lisinopril (ZESTRIL) 10 mg tablet, Take 10 mg by mouth daily, Disp: , Rfl:   •  metFORMIN (GLUCOPHAGE) 500 mg tablet, Take 500 mg by mouth daily with breakfast, Disp: , Rfl:   •  metoprolol succinate (TOPROL-XL) 50 mg 24 hr tablet, Take 50 mg by mouth daily, Disp: , Rfl:   •  omeprazole (PriLOSEC) 20 mg delayed release capsule, Take 20 mg by mouth daily, Disp: , Rfl:   •  Probiotic Product (Acidophilus) CHEW, Chew 1 tablet in the morning, Disp: , Rfl:   •  semaglutide, 0.25 or 0.5 mg/dose, (Ozempic, 0.25 or 0.5 MG/DOSE,) 2 mg/3 mL injection pen, Inject 0.5 mg under the skin every 7 days, Disp: , Rfl:   •  Silodosin 8 MG CAPS, Take 8 mg by mouth daily, Disp: , Rfl:         Mariana Henao MD

## 2024-05-02 ENCOUNTER — HOSPITAL ENCOUNTER (OUTPATIENT)
Dept: ULTRASOUND IMAGING | Facility: HOSPITAL | Age: 89
Discharge: HOME | End: 2024-05-02
Attending: UROLOGY
Payer: MEDICARE

## 2024-05-02 ENCOUNTER — PROCEDURE VISIT (OUTPATIENT)
Dept: UROLOGY | Facility: CLINIC | Age: 89
End: 2024-05-02
Payer: MEDICARE

## 2024-05-02 VITALS
BODY MASS INDEX: 27.26 KG/M2 | SYSTOLIC BLOOD PRESSURE: 118 MMHG | HEIGHT: 74 IN | RESPIRATION RATE: 20 BRPM | OXYGEN SATURATION: 98 % | TEMPERATURE: 97.8 F | WEIGHT: 212.4 LBS | HEART RATE: 63 BPM | DIASTOLIC BLOOD PRESSURE: 70 MMHG

## 2024-05-02 DIAGNOSIS — R31.0 GROSS HEMATURIA: ICD-10-CM

## 2024-05-02 DIAGNOSIS — R33.9 URINARY RETENTION: Primary | ICD-10-CM

## 2024-05-02 DIAGNOSIS — Z87.440 HISTORY OF RECURRENT UTIS: ICD-10-CM

## 2024-05-02 PROCEDURE — 52000 CYSTOURETHROSCOPY: CPT | Performed by: UROLOGY

## 2024-05-02 PROCEDURE — 76770 US EXAM ABDO BACK WALL COMP: CPT

## 2024-05-02 PROCEDURE — 87077 CULTURE AEROBIC IDENTIFY: CPT | Performed by: UROLOGY

## 2024-05-02 PROCEDURE — 87086 URINE CULTURE/COLONY COUNT: CPT | Performed by: UROLOGY

## 2024-05-02 PROCEDURE — 87186 SC STD MICRODIL/AGAR DIL: CPT | Performed by: UROLOGY

## 2024-05-02 NOTE — PROGRESS NOTES
Cystoscopy     Date/Time  5/2/2024 1:30 PM     Performed by  Khoi Henao MD   Authorized by  Khoi Henao MD     Universal Protocol:  Consent: Verbal consent obtained. Written consent obtained.  Risks and benefits: risks, benefits and alternatives were discussed  Consent given by: patient  Patient understanding: patient states understanding of the procedure being performed  Patient consent: the patient's understanding of the procedure matches consent given  Procedure consent: procedure consent matches procedure scheduled  Relevant documents: relevant documents present and verified  Patient identity confirmed: verbally with patient      Procedure Details:  Procedure type: cystoscopy    Patient tolerance: Patient tolerated the procedure well with no immediate complications    Additional Procedure Details: Patient with recent gross hematuria which resolved following antibiotic treatment for culture positive UTI.  Patient here to complete evaluation for hematuria.  Had requested renal ultrasound possibly done earlier today at [facility].    Previous cultures reviewed.  Patient placed in the supine position penis prepped and draped in usual sterile fashion using Betadine.  Lidocaine jelly instilled.  Patient straight cath and residual obtained.  About 300 cc this was sent for culture.  Flexible cystoscopy then performed.  Urethra normal no strictures prostate is somewhat obstructing and about 4 cm in length, some lateral lobe effacement.  Upon entering the bladder there is evidence of global mild cystitis like changes.  There were no bladder tumors stones or diverticula.  There is some small cellules the interior of which are normal.  There was a small amount of debris within the bladder either from amorphous phosphates or possibly the recent infection.  The scope was retroflexed and the bladder outlet appears normal other than some BPH.    The bladder was left full and the scope removed.  I then straight cath the  patient with a coudé catheter.  This passed more easily than the straight catheter which she had been using.    We emptied the bladder.    The patient's recent hematuria is likely related to chronic cystitis, self cath as well as his blood thinners.  I think it would help for him to use a coudé catheter.  This would result in less catheter related trauma.  I placed him on Keflex for 5 days and actually gave him the Keflex can samples.  Previous cultures revealed Klebsiella which was sensitive to Keflex.  He is likely colonized.  He will be self cathing 3 times daily.  I await the renal ultrasound but did review the images which did not reveal any significant abnormality to my eye.  There was a simple renal cyst noted and no bladder abnormality.    We will have a phone conference in 6 months but he is going to call to confirm that the coudé 14 Stateless lubricated catheters work well for him.  If not we will try an alternate coudé catheter.  The prelubricated catheters may be difficult for him.  The patient will be on self cath 3 times daily due to chronic urinary retention issues and bladder dysfunction.  This is a chronic lifelong issue.  We plan for him to use 14 Stateless coudé catheters.

## 2024-05-04 LAB — BACTERIA UR CULT: ABNORMAL

## 2024-05-06 ENCOUNTER — TELEPHONE (OUTPATIENT)
Dept: UROLOGY | Facility: CLINIC | Age: 89
End: 2024-05-06

## 2024-05-06 RX ORDER — CEFUROXIME AXETIL 500 MG/1
500 TABLET ORAL EVERY 12 HOURS SCHEDULED
Qty: 6 TABLET | Refills: 0 | Status: SHIPPED | OUTPATIENT
Start: 2024-05-06 | End: 2024-05-09

## 2024-05-06 NOTE — TELEPHONE ENCOUNTER
----- Message from Khoi Henao MD sent at 5/6/2024 11:50 AM EDT -----  These notify patient that I sent in Ceftin for 3 additional days that should start when he completes his Keflex which was given to him in the office..  We did note bacteria in his urine as expected at the time of the cystoscopy on the culture similar to the organism he has had previously on cultures.  I discussed the fact that he is likely colonized which is typical for patients on self cath.  It is important for him to continue self cath 3 times daily.  Thanks

## 2024-05-06 NOTE — RESULT ENCOUNTER NOTE
These notify patient that I sent in Ceftin for 3 additional days that should start when he completes his Keflex which was given to him in the office..  We did note bacteria in his urine as expected at the time of the cystoscopy on the culture similar to the organism he has had previously on cultures.  I discussed the fact that he is likely colonized which is typical for patients on self cath.  It is important for him to continue self cath 3 times daily.  Thanks

## 2024-05-07 NOTE — TELEPHONE ENCOUNTER
Faxed [employer]  medical forms for cath supplies.Confirmation received.  Edmund Thomas aware. Nothing else needed at this time.

## 2024-05-07 NOTE — TELEPHONE ENCOUNTER
Home phone busy. Left message on mobile number to call office back. Please relay Dr. Henao message.

## 2024-05-07 NOTE — TELEPHONE ENCOUNTER
Patients son, William returning the call.     Gave him Dr. Henao's message, verbalized understanding.    William also states that at his visit the patient was given a few different catheters so that he could decide which one he likes best. Patient prefers   Speed Cath Standard Coloplast 14 F Coude Model # [id number].    They are requesting if those could be ordered and sent to the home.

## 2024-05-10 ENCOUNTER — TELEPHONE (OUTPATIENT)
Dept: UROLOGY | Facility: CLINIC | Age: 89
End: 2024-05-10

## 2024-05-10 DIAGNOSIS — R33.9 URINARY RETENTION: Primary | ICD-10-CM

## 2024-07-05 ENCOUNTER — TELEPHONE (OUTPATIENT)
Dept: CARDIOLOGY | Facility: CLINIC | Age: 89
End: 2024-07-05

## 2024-07-05 NOTE — TELEPHONE ENCOUNTER
Was discussed with Dr. Meyer back in April 2024.  Would continue to monitor for now.  Follow-up as scheduled

## 2024-07-05 NOTE — TELEPHONE ENCOUNTER
[facility] [place] [facility] Alert      HEATHER pt      Pacemaker Report (Biotronik)    Monitoring period: 4/03/24-7/02/24    Battery/Lead Status:  90%/RV Capture threshold abrupt increase >1V and/or out of range    :  87%    DOT:  7/02/24; RV capture control is disabled since 4/5/24 escalated to follow up tab (Yellow 2 ) escalated to follow up tab on July 5th, 2024, 8:45 am EST.   Assign to following provider after review.        Addendum:  This was noted 4/5/24.  Please see the below.    Hx of Permanent AF.   On Eliquis.  S/P TAVR in 2019.    VVIR-CLS    RVP = 87%    Next OV with Dr. Meyer is 10/8/24.          RV Lead Trend:                7/2/24 Presenting EGM:

## 2024-10-07 ENCOUNTER — PATIENT MESSAGE (OUTPATIENT)
Dept: UROLOGY | Facility: CLINIC | Age: 89
End: 2024-10-07

## 2024-10-07 NOTE — PATIENT COMMUNICATION
Dr. Henao was unable to speak with the ER doctors but did speak with patient's son regarding issues. Patient is being evaluated at the ER due to having urgency, UTI symptoms, bladder pain, and possible urinary retention despite antibiotics.

## 2024-10-08 ENCOUNTER — TELEPHONE (OUTPATIENT)
Age: 89
End: 2024-10-08

## 2024-10-08 NOTE — TELEPHONE ENCOUNTER
LUIS FERNANDO Damon with [facility] is with patient and his family and they request that Marisol speak with Dr. Henao personally    Her CB #[phone]

## 2024-10-09 ENCOUNTER — TELEPHONE (OUTPATIENT)
Dept: CARDIOLOGY | Facility: CLINIC | Age: 89
End: 2024-10-09
Payer: MEDICARE

## 2024-10-09 NOTE — TELEPHONE ENCOUNTER
Spoke to patient's son.  Patient has been rescheduled for ov with Dr. Meyer and Dr. Mejia on 1/14/2025.

## 2024-10-23 ENCOUNTER — PATIENT MESSAGE (OUTPATIENT)
Dept: UROLOGY | Facility: CLINIC | Age: 89
End: 2024-10-23

## 2024-10-23 DIAGNOSIS — R33.9 URINARY RETENTION: Primary | ICD-10-CM

## 2024-10-29 RX ORDER — FERROUS SULFATE 325(65) MG
325 TABLET ORAL EVERY OTHER DAY
COMMUNITY
Start: 2024-07-30

## 2024-10-29 NOTE — PATIENT COMMUNICATION
Spoke with son William regarding upcoming appointment. He said that Dr. Henao can call his number to speak with patient at the rehab he will be with him. Regarding the catheter, he had it removed and patient is straight cathing himself. They would just need an order for the nurses to straight cath if patient is unable to do it.

## 2024-11-05 ENCOUNTER — TELEMEDICINE (OUTPATIENT)
Dept: UROLOGY | Facility: CLINIC | Age: 89
End: 2024-11-05
Payer: MEDICARE

## 2024-11-05 ENCOUNTER — PATIENT MESSAGE (OUTPATIENT)
Dept: UROLOGY | Facility: CLINIC | Age: 89
End: 2024-11-05

## 2024-11-05 DIAGNOSIS — R33.9 URINARY RETENTION: Primary | ICD-10-CM

## 2024-11-05 DIAGNOSIS — Z87.440 HISTORY OF RECURRENT UTIS: ICD-10-CM

## 2024-11-05 PROCEDURE — 99442 PR PHYS/QHP TELEPHONE EVALUATION 11-20 MIN: CPT | Performed by: UROLOGY

## 2024-11-05 NOTE — PROGRESS NOTES
UROLOGY PROGRESS NOTE         NAME: Ivan Alcala  AGE: 89 y.o. SEX: male  : 3/13/1932   MRN: [id number]    DATE: 2024  TIME: 11:58 PM    Assessment and Plan      Impression:   Urinary retention.  Recurrent UTIs.     Plan: Phone call visit today and discussed patient's recent hospitalization for UTI.  Patient is in rehab and will be discharged soon.  He is back on his self cath regimen at least 3 times daily he is not having any issues he has the catheters that he prefers.  Sugars are getting under better control.  He finished his antibiotic regimen.  I would like to see him back in 6 months.  No changes to the overall regimen urologically.  He also has 2 foot wounds that are healing and thus affecting his ambulation.  He sees a podiatrist.  I suggested he discuss hyperbaric oxygen with his podiatrist.  Will plan a follow-up visit in 6 months but I could see him sooner if needed.      Chief Complaint   No chief complaint on file.    History of Present Illness     HPI: Ivan Alcala is a 89 y.o. year old male who presents with history of recurrent UTIs and urinary retention.  Patient been on self catheter 3 times daily was recently hospitalized for UTI.  Patient is in an extended care facility for rehab.  Based on recent notes from family would like to have Edward removed and go back on self cath.  Phone visit.  Patient and son identified at the beginning of visit.  He they are at home in the [facility] I am at the office on close doors for patient confidentiality.  They understand the limitations of a phone call visit.  And I received permission from them to treat by phone.  A total of 10 minutes were spent on the call              The following portions of the patient's history were reviewed and updated as appropriate: allergies, current medications, past family history, past medical history, past social history, past surgical history and problem list.  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, unspecified CKD stage ([facility])     Stage 3a chronic kidney disease    Kidney disease, chronic, stage III (GFR 30-59 ml/min) ([facility])     Left ventricular ejection fraction less than 40%     Murmur, cardiac     Normocytic anemia     Osteomyelitis ([facility]) 04/04/2017    Paroxysmal atrial fibrillation ([facility])     Rhinorrhea     Type 2 diabetes mellitus ([facility])      Past Surgical History:   Procedure Laterality Date    AMPUTATION  08/12/2020    left toe    AORTIC VALVE REPLACEMENT  2019    Open femoral    BACK SURGERY      lower back lumbar disc removed    CARDIAC PACEMAKER PLACEMENT  10/2013    COLECTOMY  01/01/2012    Partial removal of colon    COLONOSCOPY  12/04/2012    CORONARY ARTERY BYPASS GRAFT  01/01/1994    EGD  02/17/2023    FL GUIDED NEEDLE PLAC BX/ASP/INJ  03/10/2017    FL MYELOGRAM LUMBAR  3/7/2022    FL MYELOGRAM LUMBAR  3/17/2022    FOOT SURGERY Right 11/11/2015    Remove foreign body    MEDIPORT INSERTION, SINGLE      MOHS SURGERY Left 04/08/2024    Ear    TOE AMPUTATION Right 03/31/2017 7/5/2018     shoulder  Review of Systems     Const: Denies chills, fever and weight loss.  CV: Denies chest pain.  Resp: Denies SOB.  GI: Denies abdominal pain, nausea and vomiting.  : Denies symptoms other than stated above.  Musculo: Denies back pain.    Objective   There were no vitals taken for this visit.          Procedure   Procedures     Current Medications     Current Outpatient Medications:     aspirin (Aspirin 81) 81 mg chewable tablet, Chew  81 mg daily, Disp: , Rfl:     bumetanide (BUMEX) 0.5 MG tablet, Take 0.5 mg by mouth daily, Disp: , Rfl:     Cholecalciferol 25 MCG (1000 UT) capsule, Take 1,000 Units by mouth daily, Disp: , Rfl:     Cyanocobalamin 1000 MCG CAPS, Take 1,000 mcg by mouth daily, Disp: , Rfl:     dutasteride (AVODART) 0.5 mg capsule, Take 0.5 mg by mouth daily, Disp: , Rfl:     Eliquis 2.5 MG, Take 2.5 mg by mouth 2 (two) times a day, Disp: , Rfl:     ezetimibe (Zetia) 10 mg tablet, Take 10 mg by mouth daily, Disp: , Rfl:     FeroSul 325 (65 Fe) MG tablet, Take 325 mg by mouth daily with breakfast, Disp: , Rfl:     ipratropium (ATROVENT) 0.06 % nasal spray, 1 spray into each nostril 3 (three) times a day as needed, Disp: , Rfl:     lisinopril (ZESTRIL) 10 mg tablet, Take 10 mg by mouth daily, Disp: , Rfl:     metFORMIN (GLUCOPHAGE) 500 mg tablet, Take 500 mg by mouth daily with breakfast, Disp: , Rfl:     metoprolol succinate (TOPROL-XL) 50 mg 24 hr tablet, Take 50 mg by mouth daily, Disp: , Rfl:     omeprazole (PriLOSEC) 20 mg delayed release capsule, Take 20 mg by mouth daily, Disp: , Rfl:     Probiotic Product (Acidophilus) CHEW, Chew 1 tablet in the morning, Disp: , Rfl:     semaglutide, 0.25 or 0.5 mg/dose, (Ozempic, 0.25 or 0.5 MG/DOSE,) 2 mg/3 mL injection pen, Inject 0.5 mg under the skin every 7 days, Disp: , Rfl:     Silodosin 8 MG CAPS, Take 8 mg by mouth daily, Disp: , Rfl:         Mariana Henao MD

## 2024-11-06 ENCOUNTER — TELEPHONE (OUTPATIENT)
Dept: UROLOGY | Facility: CLINIC | Age: 89
End: 2024-11-06

## 2024-11-06 NOTE — TELEPHONE ENCOUNTER
----- Message from Khoi Henao MD sent at 11/5/2024 12:01 AM EST -----  Please see recent son's message to me regarding patient.  I approve of him having his Edward removed and going back on self cath 3 times daily with the type of catheter they requested.  He has been doing this for many years.  Please facilitate this process and let me know what needs to be done with the extended care facility noted in the message.  Thank you

## 2024-11-06 NOTE — TELEPHONE ENCOUNTER
Spoke with patients son and made him aware that I will discuss catheter supplies with [facility]. Patient's son verbalized understanding. I called [facility] and they will need to talk to patient's son. Spoke with Patty from [facility] and she needed a clarification for patient's paperwork before sending regular supplies. Will have Dr. Henao update and sign addendum. Linda said they will send catheter supplies to cover patient until addendum is signed by Dr. Henao on 11/14/24. Patient's son called and made aware and he is going to reach out to [facility], gave patient's son the number for [facility].

## 2024-11-12 NOTE — TELEPHONE ENCOUNTER
Mirtha called to verify if we received request from [facility] for medical supplies including coude. She said we'll need to specify why he needs coude and also if it's permanent or temporary.  fax [phone]   phone [phone] option 5, option 2

## 2024-11-13 NOTE — TELEPHONE ENCOUNTER
We did receive the information. Called and spoke with Veronica and made her aware that Dr. Henao is back in the office tomorrow afternoon to sign the forms. They verbalized understanding of message.

## 2024-11-14 NOTE — TELEPHONE ENCOUNTER
Faxed updated signed notes from Dr. Henao. Spoke with Deepika at [facility] and made aware that forms were faxed. She verbalized understanding of message.

## 2024-11-18 ENCOUNTER — PATIENT MESSAGE (OUTPATIENT)
Dept: UROLOGY | Facility: CLINIC | Age: 89
End: 2024-11-18

## 2024-12-03 ENCOUNTER — TELEPHONE (OUTPATIENT)
Dept: UROLOGY | Facility: CLINIC | Age: 89
End: 2024-12-03

## 2024-12-03 NOTE — TELEPHONE ENCOUNTER
Karuna from [facility] called for orders to be sent to their pharmacy for the following medications     Tamsulosin 0.4 mg capsule - taking 1 capsule daily    Finasteride 5 mg - taking 1 tablet daily    Oxybutynin 5 mg - taking 1 tablet twice a day     Please call pharmacy with any questions     [phone] ext [phone]

## 2024-12-03 NOTE — TELEPHONE ENCOUNTER
Spoke to Karuna, she states Tran the nurse where pt is a resident called for these meds to be ordered.  She states he was recently admitted but not sure which hospital.    Called Tran at [facility].  Left message for her to call us back.

## 2024-12-06 NOTE — TELEPHONE ENCOUNTER
Pt came to [facility] on 11/12/24 from the [facility]. Advised we have not prescribed these medications. She will contact PCP.     Dr. Henao just sending you as an fyi.

## 2024-12-06 NOTE — TELEPHONE ENCOUNTER
Pt's son William returned call to the office, stated the pt is currently at [facility] in [place], [place].

## 2025-03-11 ENCOUNTER — OFFICE VISIT (OUTPATIENT)
Dept: CARDIOLOGY | Facility: CLINIC | Age: 89
End: 2025-03-11
Payer: MEDICARE

## 2025-03-11 VITALS
DIASTOLIC BLOOD PRESSURE: 82 MMHG | WEIGHT: 224 LBS | BODY MASS INDEX: 28.75 KG/M2 | SYSTOLIC BLOOD PRESSURE: 134 MMHG | HEART RATE: 70 BPM | HEIGHT: 74 IN

## 2025-03-11 VITALS
WEIGHT: 224 LBS | OXYGEN SATURATION: 99 % | HEART RATE: 66 BPM | BODY MASS INDEX: 28.75 KG/M2 | SYSTOLIC BLOOD PRESSURE: 132 MMHG | DIASTOLIC BLOOD PRESSURE: 60 MMHG | HEIGHT: 74 IN

## 2025-03-11 DIAGNOSIS — I50.43 CHF (CONGESTIVE HEART FAILURE), NYHA CLASS III, ACUTE ON CHRONIC, COMBINED (CMS/HCC): ICD-10-CM

## 2025-03-11 DIAGNOSIS — I48.20 CHRONIC ATRIAL FIBRILLATION (CMS/HCC): Chronic | ICD-10-CM

## 2025-03-11 DIAGNOSIS — Z95.2 S/P TAVR (TRANSCATHETER AORTIC VALVE REPLACEMENT): Primary | ICD-10-CM

## 2025-03-11 DIAGNOSIS — R93.1 DECREASED CARDIAC EJECTION FRACTION: ICD-10-CM

## 2025-03-11 DIAGNOSIS — Z95.0 PACEMAKER: ICD-10-CM

## 2025-03-11 DIAGNOSIS — Z95.2 S/P TAVR (TRANSCATHETER AORTIC VALVE REPLACEMENT): Primary | Chronic | ICD-10-CM

## 2025-03-11 DIAGNOSIS — Z95.1 S/P CABG (CORONARY ARTERY BYPASS GRAFT): Chronic | ICD-10-CM

## 2025-03-11 PROCEDURE — 99215 OFFICE O/P EST HI 40 MIN: CPT | Performed by: INTERNAL MEDICINE

## 2025-03-11 PROCEDURE — 93000 ELECTROCARDIOGRAM COMPLETE: CPT | Performed by: INTERNAL MEDICINE

## 2025-03-11 PROCEDURE — 99212 OFFICE O/P EST SF 10 MIN: CPT | Performed by: INTERNAL MEDICINE

## 2025-03-11 RX ORDER — FUROSEMIDE 40 MG/1
20 TABLET ORAL DAILY
COMMUNITY
End: 2025-03-11

## 2025-03-11 RX ORDER — FUROSEMIDE 40 MG/1
40 TABLET ORAL DAILY
COMMUNITY
Start: 2025-03-11

## 2025-03-11 ASSESSMENT — ENCOUNTER SYMPTOMS
BACK PAIN: 1
PND: 0
TREMORS: 0
WEAKNESS: 0
SHORTNESS OF BREATH: 0
WEAKNESS: 0
ORTHOPNEA: 0
ALTERED MENTAL STATUS: 0
ALLERGIC/IMMUNOLOGIC NEGATIVE: 1
HEMATURIA: 0
PALPITATIONS: 0
BLOATING: 0
FOCAL WEAKNESS: 0
BACK PAIN: 1
FEVER: 0
WEIGHT GAIN: 0
PARESTHESIAS: 0
ABDOMINAL PAIN: 0
PND: 0
COUGH: 0
JOINT SWELLING: 1
ENDOCRINE NEGATIVE: 1
COUGH: 0
CHANGE IN BOWEL HABIT: 0
DIZZINESS: 0
PSYCHIATRIC NEGATIVE: 1
FLANK PAIN: 0
DYSPNEA ON EXERTION: 0
HEMATURIA: 0
SYNCOPE: 0
WEIGHT LOSS: 0
BLURRED VISION: 0
HEARTBURN: 0
BRUISES/BLEEDS EASILY: 0
DYSPNEA ON EXERTION: 1
DECREASED APPETITE: 0
NEAR-SYNCOPE: 0
IRREGULAR HEARTBEAT: 0
SYNCOPE: 0
SLEEP DISTURBANCES DUE TO BREATHING: 0
NEAR-SYNCOPE: 0
ORTHOPNEA: 0
LIGHT-HEADEDNESS: 0

## 2025-03-11 NOTE — ASSESSMENT & PLAN NOTE
He has a normally functioning Biotronik pacemaker that was implanted on 8/9/2023.  Battery longevity 6 years 10 months.  Mode is VVI-CLS with a base rate of 60.  The RV lead measures and R wave of 14.7 mV, lead impedance of 565 ohms, and a pacing threshold of 1.9 V at 0.4 ms.  There is pacing 91% of the time.  Underlying rhythm is atrial fibrillation.

## 2025-03-11 NOTE — PROGRESS NOTES
Subjective      Patient ID: Moises Pruitt is a 89 y.o. male.  3/13/1932      HPI    The following have been reviewed and updated as appropriate in this visit:        Review of Systems    Objective     There were no vitals filed for this visit.  There is no height or weight on file to calculate BMI.    Physical Exam    Assessment & Plan  S/P TAVR (transcatheter aortic valve replacement)         S/P CABG (coronary artery bypass graft)         Pacemaker         Decreased cardiac ejection fraction

## 2025-03-11 NOTE — PROGRESS NOTES
"   Cardiology Note       Reason for visit: Scheduled cardiology follow-up    Chief Complaint   Patient presents with    S/P TAVR (transcatheter aortic valve replacement)   Permanent pacemaker  Obstructive sleep apnea  Systemic hypertension  Coronary artery disease status post coronary bypass graft surgery.     HPI   Cain Pruitt is a 89 y.o. male who presents for cardiac follow-up.  He is accompanied by family.    He was last seen here a year ago.  Since then, he has experienced issues with recurrent urinary tract infections as well as the need for peripheral vascular surgery for repeat digit amputation.  This is slowed him down a little bit and he is not active and exercising is much as before.  Under the circumstances he does not have angina or palpitations or lightheadedness.    He has developed some mild dyspnea and had some audible wheezing.  As a consequence a chest x-ray was done in the early part of February.  This did demonstrate a small effusion and evidence of pulmonary vascular congestion.  His Lasix was transiently increased which did help.    No fever or chills.  No other signs of infection.  Good appetite.  New medications include sliding scale insulin for management of diabetes.      Past Medical History:   Diagnosis Date    A-fib (CMS/HCC)     Arthritis     b/l knees    BPH (benign prostatic hyperplasia)     CAD (coronary artery disease)     4 stents placed since 1991    Cancer of cecum (CMS/HCC) 2011    S/p resection 2011    Cecum cancer (CMS/HCC)     12/2/2011 no chemo or XRT.     Colon polyps     Coronary artery disease 1991    s/p cabg     Decreased cardiac ejection fraction 1/24/2019    Edema     Erectile dysfunction     GI (gastrointestinal bleed)     GI (gastrointestinal bleed) 06/2016    Heart block 1/24/2019    History of percutaneous coronary intervention 8/12/2019    BMS to SVG to OM 2007    History of transfusion 2011    during colon resection had \"nicked artery\"- required multiple " unitos pf blood     Hyperlipidemia     Hypertension     Hypotension     Lumbar herniated disc     SIENA (obstructive sleep apnea) 8/12/2019    Osteomyelitis of toe of right foot (CMS/[facility]) 2016    s/p 2nd and 3rd toe osteomylitis     Pacemaker 1/24/2019    PAD (peripheral artery disease) (CMS/[facility]) 8/12/2019    Peripheral neuropathy     tips of fingers  and feet     S/P CABG (coronary artery bypass graft) 1991 1991 LIMA to LAD, ARTURO to RDA, SVG to OM    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    Transfemoral TAVR #29 Evolut Pro on 11-    Sleep apnea     Syncope     2008,2011    Type 2 diabetes mellitus (CMS/[facility])     last HgBA1C was 7.2 in 7/2019 managed by PCP Dr Aguilar Phan    Urinary retention     self cath's 3 times per day,     Vitamin D deficiency      Past Surgical History   Procedure Laterality Date    Amputation foot / toe Right 2016    2nd and 3rd right foot from osteomylitis     Bowel resection  2011    Cardiac pacemaker placement  2013    Cardiac surgery      Cardioversion  2008    Coronary & bypass graft angiography N/A 9/24/2019    Performed by Efe Mejia MD at [facility] CARDIAC CATH/EP    Coronary artery bypass graft  1991    x 4 bypasses     Coronary stent placement      3 stents    Knee surgery Right 1951    Mohs surgery      PPM - SINGLE CHAMBER GENERATOR CHANGE Left 8/9/2023    Performed by Stevo Meyer MD at [facility] CARDIAC CATH/EP    Skin biopsy      TAVR percutaneous femoral N/A 11/27/2019    Performed by Efe Mejia MD at [facility] OR    TAVR TRANSFEMORAL AORTIC VALVE REPLACEMENT N/A 11/27/2019    Performed by Bryant Lei MD at [facility] OR     Social History     Socioeconomic History    Marital status:    Tobacco Use    Smoking status: Never    Smokeless tobacco: Never   Vaping Use    Vaping status: Never Used   Substance and Sexual Activity    Alcohol use: Yes     Comment: occassionally/social    Drug use: Defer    Sexual activity: Defer     Family History    Problem Relation Name Age of Onset    Clotting disorder Biological Mother Nhi     Leukemia Biological Father Darian      Patient has no known allergies.  Current Outpatient Medications   Medication Sig Dispense Refill    apixaban (ELIQUIS) 5 mg tablet Take 0.5 tablets (2.5 mg total) by mouth 2 (two) times a day. To stop 2 days pre-op      aspirin 81 mg enteric coated tablet Take 1 tablet (81 mg total) by mouth daily. 30 tablet 0    cholecalciferol, vitamin D3, 1,000 unit (25 mcg) tablet Take 1,000 Units by mouth daily.        cyanocobalamin (VITAMIN B12) 1,000 mcg tablet Take 1,000 mcg by mouth daily.      dutasteride (AVODART) 0.5 mg capsule Take 0.5 mg by mouth nightly.    0    ezetimibe 10 mg tablet take 1 tablet by mouth NIGHTLY 90 tablet 3    ferrous sulfate 325 mg (65 mg iron) tablet Take 65 mg by mouth every other day.        furosemide (LASIX) 40 mg tablet Take 20 mg by mouth daily.      ipratropium (ATROVENT) 0.03 % nasal spray Administer 0.03 % into affected nostril(s) as needed. 2 sprays by intranasal route 2-3 times every day in each nostril       LACTOBACILLUS ACIDOPHILUS (PROBIOTIC ORAL) Take by mouth daily.      lisinopriL (PRINIVIL) 10 mg tablet Take 1 tablet (10 mg total) by mouth daily. 90 tablet 3    metoprolol succinate XL (TOPROL-XL) 50 mg 24 hr tablet Take 50 mg by mouth nightly.        omeprazole (PriLOSEC) 20 mg capsule Take 20 mg by mouth once.      silodosin (RAPAFLO) 8 mg capsule Take 8 mg by mouth nightly.         No current facility-administered medications for this visit.       Review of Systems   Constitutional: Negative for decreased appetite, fever, weight gain and weight loss.   HENT:  Negative for congestion and nosebleeds.    Eyes:  Negative for blurred vision and visual disturbance.   Cardiovascular:  Positive for dyspnea on exertion. Negative for chest pain, leg swelling, near-syncope, orthopnea, paroxysmal nocturnal dyspnea and syncope.   Respiratory:  Negative for cough  and sleep disturbances due to breathing.    Endocrine: Negative.    Hematologic/Lymphatic: Negative for bleeding problem. Does not bruise/bleed easily.   Skin:  Negative for rash.   Musculoskeletal:  Positive for back pain, joint pain and joint swelling. Negative for arthritis.   Gastrointestinal:  Positive for melena. Negative for bloating, change in bowel habit and heartburn.   Genitourinary:  Negative for flank pain and hematuria.   Neurological:  Negative for dizziness, light-headedness and weakness.   Psychiatric/Behavioral: Negative.     Allergic/Immunologic: Negative.      Objective    Blood pressure seated position standard cuff right arm 122/70.  Resting respiratory rate 16/min.  Pulse is regular at 70 bpm.    Physical Exam:    General Appearance:  Alert, no distress.  In a wheelchair.   Head:  Normocephalic, without obvious abnormality, atraumatic   Eyes:  Conjunctiva/corneas clear, EOM's intact   Neck: No thyroid enlargement. No JVD. No bruits    Lungs:   Clear to auscultation bilaterally, respirations unlabored, no rales, no wheezing   Heart:  Regular rhythm, S1 and S2 normal,  Systolic ejection murmur, rub or gallop.  Single extrasystole heard.  No obvious diastolic murmur heard at rest or provoked with maneuver.   Abdomen:   Soft, non-tender, no masses, no organomegaly   Vascular: Pulses 2+ and symmetric all extremities, no carotid bruit or jugular vein distention   Musculoskeletal:  Skin: Supportive boots on both feet status post surgical intervention.  Scattered solar and seborrheic keratoses about the upper extremities and face.   Extremities: Extremities normal, atraumatic, pulses normal   Behavior/Emotional:  Neurologic: Appropriate, cooperative  No gross neurologic abnormalities.      Transthoracic echo March 16, 2023:  Interpretation Summary         Left Ventricle: Normal ventricle size. Mild concentric left ventricular hypertrophy. Low normal systolic function. Estimated EF 50-55%. Abnormal  septal motion consistent with right ventricular pacemaker. Unable to assess diastolic filling pattern.    Right Ventricle: Normal ventricle size. Low normal systolic function.    Left Atrium: Moderately dilated atrium.    Right Atrium: Moderately dilated atrium.    Aortic Valve: The patient is s/p TAVR #29 Evolut Pro on 11-. The valve is not well visualized, but in limited views appears grossly normal. There is mild to moderate transvalvular regurgitation. Mild to moderate regurgitation with a centrally directed jet. Peak velocity = 1.88 m/s. Mean gradient = 8.00 mmHg.    Mitral Valve: Moderate calcification of the posterior leaflet. Normal leaflet motion. Mild mitral annular calcification. Mild regurgitation. No stenosis.    Tricuspid Valve: Normal structure. Mild regurgitation. Estimated RVSP = 37 mmHg.    Pulmonic Valve: Grossly normal structure. Trace regurgitation. No stenosis.    Pericardium: No evidence of pericardial effusion.    IVC/SVC: Inferior vena cava is a small caliber vessel (<1.7cm). Inferior vena cava collapses >50% during inspiration.    Aorta: Sinuses of Valsalva normal-sized. Aortic arch normal-sized. Mild dilatation of the ascending aorta at 4.3 cm.    Prior Study: Prior study available for comparison. Prior study date: 09/07/2021. Changes noted compared to prior study.  The ejection fraction is in the low normal range on the current study.  Gradients across the TAVR valve are similar.  The ascending aorta is mildly dilated on the current study.     ASSESSMENT/PLAN    SIENA (obstructive sleep apnea)  He does not use CPAP.     Atrial fibrillation (CMS/HCC)/Heart block/status post permanent pacemaker.  He follows up with Dr. Meyer. He continues on rate control agents and anticoagulation long-term.    S/P TAVR (transcatheter aortic valve replacement)  Transfemoral TAVR #29 Evolut Pro on 11-.  While his exam is not remarkable for overt volume overload, in view of his inactivity,  recent symptoms of dyspnea and chest x-ray findings, I think would be prudent to increase his Lasix to 40 mg daily.    Coronary artery disease involving native coronary artery without angina pectoris/S/P CABG (coronary artery bypass graft)/CHF (congestive heart failure), NYHA class III, acute on chronic, combined (CMS/[facility])  1991 LIMA to LAD, ARTURO to RDA, SVG to OM. He has no obvious angina or heart failure.  He will remain on his current medications.       Hypertension  Under fair control today on his current medications.  Sodium limitation in his diet was emphasized.     Type 2 diabetes mellitus (CMS/[facility])  He is on oral therapy and follows up with his primary care physician regarding his diabetes.  Tolerating Ozempic without side effect with an improvement in his hemoglobin A1c.     Hyperlipidemia  He is on lipid-lowering therapy which he tolerates.      No obvious changes in cardiovascular exam.  I have placed no limits on his activity.  I have asked him to return for follow-up in 6 months.     Efe Mejia MD  3/11/2025

## 2025-03-11 NOTE — PROGRESS NOTES
"     Electrophysiology  Outpatient Progress Note       Reason for visit: Follow-up    HPI   Cain Pruitt is a 89 y.o. male who is seen today  for follow up of chronic permanent atrial fibrillation and sick sinus syndrome.  He had a generator change in August 2023.    He presents today accompanied by his son. He has undergone recent foot surgeries and has been more sedentary. More recently, he has had some fluid retention and his Bumex was switched to furosemide. He has no cardiovascular complaints today.     He has a history of coronary heart disease status post percutaneous coronary intervention and bypass graft surgery.        Past Medical History:   Diagnosis Date    A-fib (CMS/[facility])     Arthritis     b/l knees    BPH (benign prostatic hyperplasia)     CAD (coronary artery disease)     4 stents placed since 1991    Cancer of cecum (CMS/[facility]) 2011    S/p resection 2011    Cecum cancer (CMS/HCC)     12/2/2011 no chemo or XRT.     Colon polyps     Coronary artery disease 1991    s/p cabg     Decreased cardiac ejection fraction 1/24/2019    Edema     Erectile dysfunction     GI (gastrointestinal bleed)     GI (gastrointestinal bleed) 06/2016    Heart block 1/24/2019    History of percutaneous coronary intervention 8/12/2019    BMS to SVG to OM 2007    History of transfusion 2011    during colon resection had \"nicked artery\"- required multiple unitos pf blood     Hyperlipidemia     Hypertension     Hypotension     Lumbar herniated disc     SIENA (obstructive sleep apnea) 8/12/2019    Osteomyelitis of toe of right foot (CMS/[facility]) 2016    s/p 2nd and 3rd toe osteomylitis     Pacemaker 1/24/2019    PAD (peripheral artery disease) (CMS/HCC) 8/12/2019    Peripheral neuropathy     tips of fingers  and feet     S/P CABG (coronary artery bypass graft) 1991 1991 LIMA to LAD, ARTURO to RDA, SVG to OM    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    Transfemoral TAVR #29 Evolut Pro on 11-    Sleep apnea     " Syncope     2008,2011    Type 2 diabetes mellitus (CMS/[facility])     last HgBA1C was 7.2 in 7/2019 managed by PCP Dr Aguilar Phan    Urinary retention     self cath's 3 times per day,     Vitamin D deficiency      Past Surgical History   Procedure Laterality Date    Amputation foot / toe Right 2016    2nd and 3rd right foot from osteomylitis     Bowel resection  2011    Cardiac pacemaker placement  2013    Cardiac surgery      Cardioversion  2008    Coronary & bypass graft angiography N/A 9/24/2019    Performed by Efe Mejia MD at [facility] CARDIAC CATH/EP    Coronary artery bypass graft  1991    x 4 bypasses     Coronary stent placement      3 stents    Knee surgery Right 1951    Mohs surgery      PPM - SINGLE CHAMBER GENERATOR CHANGE Left 8/9/2023    Performed by Stevo Meyer MD at [facility] CARDIAC CATH/EP    Skin biopsy      TAVR percutaneous femoral N/A 11/27/2019    Performed by Efe Mejia MD at [facility] OR    TAVR TRANSFEMORAL AORTIC VALVE REPLACEMENT N/A 11/27/2019    Performed by Bryant Lei MD at [facility] OR       Social History     Tobacco Use    Smoking status: Never    Smokeless tobacco: Never   Vaping Use    Vaping status: Never Used   Substance Use Topics    Alcohol use: Yes     Comment: occassionally/social    Drug use: Defer     Family History   Problem Relation Name Age of Onset    Clotting disorder Biological Mother Nhi     Leukemia Biological Father Darian        ALLERGY:  Patient has no known allergies.    Current Outpatient Medications   Medication Sig Dispense Refill    apixaban (ELIQUIS) 5 mg tablet Take 0.5 tablets (2.5 mg total) by mouth 2 (two) times a day. To stop 2 days pre-op      aspirin 81 mg enteric coated tablet Take 1 tablet (81 mg total) by mouth daily. 30 tablet 0    cholecalciferol, vitamin D3, 1,000 unit (25 mcg) tablet Take 1,000 Units by mouth daily.        cyanocobalamin (VITAMIN B12) 1,000 mcg tablet Take 1,000 mcg by mouth daily.      dutasteride (AVODART) 0.5 mg capsule  Take 0.5 mg by mouth nightly.    0    ezetimibe 10 mg tablet take 1 tablet by mouth NIGHTLY 90 tablet 3    ferrous sulfate 325 mg (65 mg iron) tablet Take 65 mg by mouth every other day.        ipratropium (ATROVENT) 0.03 % nasal spray Administer 0.03 % into affected nostril(s) as needed. 2 sprays by intranasal route 2-3 times every day in each nostril       LACTOBACILLUS ACIDOPHILUS (PROBIOTIC ORAL) Take by mouth daily.      lisinopriL (PRINIVIL) 10 mg tablet Take 1 tablet (10 mg total) by mouth daily. 90 tablet 3    metoprolol succinate XL (TOPROL-XL) 50 mg 24 hr tablet Take 50 mg by mouth nightly.        omeprazole (PriLOSEC) 20 mg capsule Take 20 mg by mouth once.      silodosin (RAPAFLO) 8 mg capsule Take 8 mg by mouth nightly.        furosemide (LASIX) 40 mg tablet Take 1 tablet (40 mg total) by mouth daily.       No current facility-administered medications for this visit.       Review of Systems   Constitutional: Positive for malaise/fatigue.   HENT:  Negative for hearing loss.    Eyes:  Negative for visual disturbance.   Cardiovascular:  Negative for chest pain, dyspnea on exertion, irregular heartbeat, leg swelling, near-syncope, orthopnea, palpitations, paroxysmal nocturnal dyspnea and syncope.   Respiratory:  Negative for cough and shortness of breath.    Hematologic/Lymphatic: Negative for bleeding problem.   Skin:  Negative for rash.   Musculoskeletal:  Positive for back pain. Negative for joint pain and muscle weakness.   Gastrointestinal:  Negative for abdominal pain.   Genitourinary:  Negative for hematuria.   Neurological:  Negative for focal weakness, paresthesias, tremors and weakness.   Psychiatric/Behavioral:  Negative for altered mental status.        Objective   Vitals:    03/11/25 1109   BP: 134/82   Pulse: 70       BP Readings from Last 3 Encounters:   03/11/25 132/60   03/11/25 134/82   04/02/24 136/60       Physical Exam  Constitutional:       Appearance: He is well-developed.   HENT:       Head: Normocephalic and atraumatic.   Eyes:      Conjunctiva/sclera: Conjunctivae normal.      Pupils: Pupils are equal, round, and reactive to light.   Cardiovascular:      Rate and Rhythm: Normal rate. Rhythm irregular.      Heart sounds: Murmur heard.      Harsh midsystolic murmur is present with a grade of 3/6 at the upper right sternal border radiating to the neck.   Pulmonary:      Breath sounds: Normal breath sounds. No wheezing or rales.   Abdominal:      General: Bowel sounds are normal.      Palpations: Abdomen is soft. There is no mass.      Tenderness: There is no abdominal tenderness.   Musculoskeletal:         General: No deformity. Normal range of motion.      Cervical back: Normal range of motion.   Skin:     General: Skin is warm and dry.      Findings: No rash.   Neurological:      Mental Status: He is alert and oriented to person, place, and time.      Cranial Nerves: No cranial nerve deficit.   Psychiatric:         Behavior: Behavior normal.         Lab Results   Component Value Date    WBC 9.66 11/28/2019    HGB 12.6 (L) 11/28/2019     11/28/2019    CHOL 183 12/20/2017    TRIG 248 (H) 12/20/2017    HDL 42 (L) 12/20/2017    ALT 24 11/13/2019    AST 20 11/13/2019     11/28/2019    K 4.4 08/09/2023    CREATININE 1.2 11/28/2019    TSH 2.00 07/23/2016    INR 1.3 11/27/2019    HGBA1C 8.3 (H) 04/18/2022       Cardiovascular Procedures:    CATH LAB:  Cath - 11/1/2007    ECHO/MUGA:  Echo - 7/15/2010  Echo (Scanned Transthoracic Echo. EF 50%.) - 11/1/2016  Echo (scanned Transthoracic Echocardiography Report EF 45 %) - 12/20/2017.Technically difficult and suboptimal echocardiogram secondary to patient characteristics.Mild concentric left ventricular hypertrophy with normal cavity size and mildly decreased systolic function. Ejection fraction 45%.Unable to evaluate for wall motion abnormalities given difficulty visualizing endocardial borders.Severe aortic stenosis with peak velocity of 4.1  m/s (Peak instantaneous gradient of 66mmHg) and mean gradient of 34 mmHg.Calculated aortic valve area 0.77cm2. Mild aortic insufficiency.Mild mitral regurgitation. Moderately dilated left atrium.Moderately dilated right ventricular size with preserved systolic function. Mild tricuspid regurgitation with estimated right ventricular systolic pressure of 46 mmHg. Compared to the prior study from 11/2/16, left ventricular function has mildly decreased frrom 50% to 45%. Aortic valve peak gradient has increased from 54mmHg to 66mHg and mean gradient has increased from 24mmHg to 34 mmHg.  Echo 4/24/2019: Critical calcific aortic valve stenosis, EF = 40- 45%. Diffuse Echocardiogram 9/7/2021: Ejection fraction estimated at 40 to 45%.  Status post transcatheter heart valve with a #29 [employer] device in the aortic position.  N  TTE 3/16/2023     Left Ventricle: Normal ventricle size. Mild concentric left ventricular hypertrophy. Low normal systolic function. Estimated EF 50-55%. Abnormal septal motion consistent with right ventricular pacemaker. Unable to assess diastolic filling pattern.    Right Ventricle: Normal ventricle size. Low normal systolic function.    Left Atrium: Moderately dilated atrium.    Right Atrium: Moderately dilated atrium.    Aortic Valve: The patient is s/p TAVR #29 Evolut Pro on 11-. The valve is not well visualized, but in limited views appears grossly normal. There is mild to moderate transvalvular regurgitation. Mild to moderate regurgitation with a centrally directed jet. Peak velocity = 1.88 m/s. Mean gradient = 8.00 mmHg.    Mitral Valve: Moderate calcification of the posterior leaflet. Normal leaflet motion. Mild mitral annular calcification. Mild regurgitation. No stenosis.    Tricuspid Valve: Normal structure. Mild regurgitation. Estimated RVSP = 37 mmHg.    Pulmonic Valve: Grossly normal structure. Trace regurgitation. No stenosis.    Pericardium: No evidence of pericardial  effusion.    IVC/SVC: Inferior vena cava is a small caliber vessel (<1.7cm). Inferior vena cava collapses >50% during inspiration.    Aorta: Sinuses of Valsalva normal-sized. Aortic arch normal-sized. Mild dilatation of the ascending aorta at 4.3 cm.    Prior Study: Prior study available for comparison. Prior study date: 09/07/2021. Changes noted compared to prior study.  The ejection fraction is in the low normal range on the current study.  Gradients across the TAVR valve are similar.  The ascending aorta is mildly dilated on the current study.    ELECTROPHYSIOLOGY:  Devices (Single Chamber PPM (Biotronik-Evia SRT), serial # [id number], ventricular lead(9/16/13)biotronik S60) -9/16/2013    STRESS TESTS:  SEH - 5/20/2009    VASCULAR:  Carotid Duplex (scanned Vascular Carotid) - 12/22/2014    CT/MRI:  Abd/Pelvis CT - 2/7/2012    OTHER:  CXR - 12/3/2011 he has a history of coronary artery disease and is status post PCI intervention and coronary artery bypass surgery.  He follows closely with Dr. Barrett.  He has no anginal symptoms.  Other (ABDOMINAL AORTIC US) - 1/20/2011  Other (CAROTID US) - 1/20/2011    TAVR 11/27/2019  s/p successful TF TAVR with 29 mm Evolut Pro    ECG   V pacing    Assessment   Problem List Items Addressed This Visit          Circulatory    Atrial fibrillation (CMS/HCC) (Chronic)    He has chronic asymptomatic rate controlled atrial fibrillation.  He is anticoagulated on Eliquis 2.5 mg twice daily.         S/P CABG (coronary artery bypass graft) (Chronic)    He has a history of CAD status post PCI intervention and CABG.  He has no anginal symptoms.           S/P TAVR (transcatheter aortic valve replacement) - Primary (Chronic)    He is status post TAVR in 2019.  Last echo March 2023 showed an EF of 50 to 55% with mild to moderate transvalvular regurgitation.         Relevant Orders    [facility] MUSE ECG 12 lead (clinic performed) (Completed)    Pacemaker    He has a normally functioning Biotronik  pacemaker that was implanted on 8/9/2023.  Battery longevity 6 years 10 months.  Mode is VVI-CLS with a base rate of 60.  The RV lead measures and R wave of 14.7 mV, lead impedance of 565 ohms, and a pacing threshold of 1.9 V at 0.4 ms.  There is pacing 91% of the time.  Underlying rhythm is atrial fibrillation.         CHF (congestive heart failure), NYHA class III, acute on chronic, combined (CMS/HCC)    The patient's son reports that he has been dealing with some fluid retention.  His Bumex was adjusted to furosemide 20 mg daily.  He is scheduled to see Dr. Mejia later this afternoon.                   Stevo Meyer MD  03/11/2025

## 2025-03-11 NOTE — ASSESSMENT & PLAN NOTE
The patient's son reports that he has been dealing with some fluid retention.  His Bumex was adjusted to furosemide 20 mg daily.  He is scheduled to see Dr. Mejia later this afternoon.

## 2025-03-13 LAB
ATRIAL RATE: 48
QRS DURATION: 218
QT INTERVAL: 510
QTC CALCULATION(BAZETT): 550
R AXIS: -81
T WAVE AXIS: 98
VENTRICULAR RATE: 70

## 2025-04-17 ENCOUNTER — TELEPHONE (OUTPATIENT)
Age: OVER 89
End: 2025-04-17

## 2025-04-17 NOTE — TELEPHONE ENCOUNTER
PCP office calling    Nursing home went to refill Finasteride and it was denied because patient also on Dutasteride. Unable to see in the chart that we have ever had him on Finasteride    Please advise which medication patient should be taking and [facility] PCP at [phone]

## 2025-04-17 NOTE — TELEPHONE ENCOUNTER
Please let  nursing home and pharmacy know There is not documentation that patient was on finasteride with our providers.  He is on dutasteride and should continue this medication

## 2025-04-18 NOTE — TELEPHONE ENCOUNTER
Spoke with patient's son and made aware that MISSY Nj stated that charanjit should be on the Dutasteride. Son William stated that his dad ended up in the hospital and his medication was switched to Finasteride due to cost. Then he went to a rehab and they continued the Finasteride. PCP was then questioning because they thought patient was on both. Son wants to know what Dr. Henao feels patient should be taking? He said that he would like the decision to be made by Dr. Henao if patient should continue the Finasteride or go back on the Dutasteride. He said that script will need to be placed for patient once the decision is made. The script can be sent to [facility]. Will contact PCP and Angelo Oneal after Dr. Henao places the order.

## 2025-04-23 NOTE — TELEPHONE ENCOUNTER
It is fine for the patient to be on finasteride only.  5 mg daily.  Okay to discontinue dutasteride.   Finasteride is equivalent to dutasteride regarding its effectiveness.  Please pend prescription for finasteride 5 mg daily 90 with 3 refills to his pharmacy.  Please notify patient     thank you

## 2025-04-24 NOTE — TELEPHONE ENCOUNTER
Pt's Son William aware of Dr. Henao's message. Verbalized understanding. Nothing else needed at this time.

## 2025-05-05 RX ORDER — GARLIC EXTRACT 500 MG
CAPSULE ORAL
COMMUNITY
Start: 2024-11-26

## 2025-05-05 RX ORDER — AMLODIPINE BESYLATE 5 MG/1
5 TABLET ORAL DAILY
COMMUNITY
Start: 2024-11-26

## 2025-05-05 RX ORDER — FUROSEMIDE 40 MG/1
40 TABLET ORAL 3 TIMES WEEKLY
COMMUNITY
Start: 2025-03-11

## 2025-05-05 RX ORDER — INSULIN ASPART 100 [IU]/ML
INJECTION, SOLUTION INTRAVENOUS; SUBCUTANEOUS
COMMUNITY
Start: 2024-11-25

## 2025-05-05 RX ORDER — TRAMADOL HYDROCHLORIDE 50 MG/1
25 TABLET ORAL 2 TIMES DAILY
COMMUNITY
Start: 2025-02-04 | End: 2025-05-06

## 2025-05-06 ENCOUNTER — TELEPHONE (OUTPATIENT)
Dept: UROLOGY | Facility: CLINIC | Age: OVER 89
End: 2025-05-06

## 2025-05-06 ENCOUNTER — TELEMEDICINE (OUTPATIENT)
Dept: UROLOGY | Facility: CLINIC | Age: OVER 89
End: 2025-05-06
Payer: MEDICARE

## 2025-05-06 DIAGNOSIS — Z87.440 HISTORY OF RECURRENT UTIS: ICD-10-CM

## 2025-05-06 DIAGNOSIS — R33.9 URINARY RETENTION: Primary | ICD-10-CM

## 2025-05-06 PROCEDURE — 99213 OFFICE O/P EST LOW 20 MIN: CPT | Performed by: UROLOGY

## 2025-05-06 NOTE — PROGRESS NOTES
UROLOGY PROGRESS NOTE         NAME: Ivan Alcala  AGE: 89 y.o. SEX: male  : 3/13/1932   MRN: [id number]    DATE: 2025  TIME: 10:18 AM    Assessment and Plan      Impression:   1. Urinary retention  2. History of recurrent UTIs       Plan: Sonny is no longer cathing since the Edward catheter was removed.  He is voiding reasonably well.  He does have urinary frequency which sounds to be most related to an increased dose of Lasix.  He feels as though he is emptying.  He has rare initial dysuria but has not had a UTI in 6 months.  No gross hematuria.  I reviewed his renal sonogram from February which was satisfactory.  There was a Edward catheter in at that time however.  Previous notes from [facility] were reviewed from December as well.  Patient is on Avodart.  He should continue this.  He has no Edward and is not self cathing and seems to be doing well.  His most recent creatinine was stable and actually lower than it had been earlier this year.  We are not going to make any changes at this point.  I spoke with he and his son William today.  They will contact me if there are any issues I will set up of 6-month phone call visit.  His underlying issues with diabetes continue and there has been a new component of CHF which he is being treated for.    Chief Complaint     Chief Complaint   Patient presents with   • Follow-up     6 month follow up visit. No longer self cathing. Patient is voiding frequently but not large volumes      History of Present Illness     HPI: Ivan Alcala is a 89 y.o. year old male who presents with history of recurrent UTIs bladder dysfunction and incomplete bladder emptying.  Patient is on self cath proximately 4 times daily.  His last visit at [facility] in [place] was satisfactory and he had been doing well.  Phone call visit with him today.  Additional toe amputation since last visit.              The following portions of the patient's history were reviewed and updated as  appropriate: allergies, current medications, past family history, past medical history, past social history, past surgical history and problem list.  Past Medical History:   Diagnosis Date   • Aortic stenosis    • Atherosclerotic heart disease of native coronary artery without angina pectoris    • BPH with obstruction/lower urinary tract symptoms    • Carcinoma, basal cell, skin    • CHF (congestive heart failure) (HCC)    • Chronic kidney disease    • Chronic systolic (congestive) heart failure (HCC)    • Diabetes mellitus (HCC)    • Diabetic neuropathy ([facility])    • DM (diabetes mellitus) ([facility])    • Gastric AVM    • Gastrointestinal hemorrhage with melena    • GI bleed 07/23/2016   • Hypertension    • 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, unspecified CKD stage ([facility])     Stage 3a chronic kidney disease   • Kidney disease, chronic, stage III (GFR 30-59 ml/min) ([facility])    • Left ventricular ejection fraction less than 40%    • Murmur, cardiac    • Normocytic anemia    • Osteomyelitis ([facility]) 04/04/2017   • Paroxysmal atrial fibrillation ([facility])    • Rhinorrhea    • Type 2 diabetes mellitus ([facility])      Past Surgical History:   Procedure Laterality Date   • AMPUTATION  08/12/2020    left toe   • AORTIC VALVE REPLACEMENT  2019    Open femoral   • BACK SURGERY      lower back lumbar disc removed   • CARDIAC PACEMAKER PLACEMENT  10/2013   • COLECTOMY  01/01/2012    Partial removal of colon   • COLONOSCOPY  12/04/2012   • CORONARY ARTERY BYPASS GRAFT  01/01/1994   • EGD  02/17/2023   • FL GUIDED NEEDLE PLAC BX/ASP/INJ  03/10/2017   • FL MYELOGRAM LUMBAR  03/07/2022   • FL MYELOGRAM LUMBAR  03/17/2022   • FOOT SURGERY Right 11/11/2015    Remove foreign body   • MEDIPORT INSERTION, SINGLE     • MOHS SURGERY Left 04/08/2024    Ear   • TOE AMPUTATION Right 03/31/2017 7/5/2018   • TOE SURGERY      right 5th toe removed     shoulder  Review of Systems     Const: Denies chills,  fever and weight loss.  CV: Denies chest pain.  Resp: Denies SOB.  GI: Denies abdominal pain, nausea and vomiting.  : Denies symptoms other than stated above.  Musculo: Denies back pain.    Objective   There were no vitals taken for this visit.        Procedure   Procedures     Current Medications     Current Outpatient Medications:   •  amLODIPine (NORVASC) 5 mg tablet, Take 5 mg by mouth daily, Disp: , Rfl:   •  cadexomer iodine (IODOSORB) 0.9 % gel, Apply to to wound, Disp: , Rfl:   •  Cholecalciferol 25 MCG (1000 UT) capsule, Take 1,000 Units by mouth daily, Disp: , Rfl:   •  Cyanocobalamin 1000 MCG CAPS, Take 1,000 mcg by mouth daily, Disp: , Rfl:   •  dutasteride (AVODART) 0.5 mg capsule, Take 0.5 mg by mouth daily, Disp: , Rfl:   •  Eliquis 2.5 MG, Take 2.5 mg by mouth 2 (two) times a day, Disp: , Rfl:   •  ezetimibe (Zetia) 10 mg tablet, Take 10 mg by mouth daily, Disp: , Rfl:   •  FeroSul 325 (65 Fe) MG tablet, Take 325 mg by mouth every other day, Disp: , Rfl:   •  furosemide (LASIX) 40 mg tablet, Take 40 mg by mouth 3 (three) times a week, Disp: , Rfl:   •  Insulin Aspart (NovoLOG) 100 units/mL injection, Units as per sliding scale; 141-180 add 1 unit, 181-220 add 2 units, 221-260 add 3 units, 261-300 add 4 units, 301-340 add 5 units, 341-380 add 6 units, 381-420 add 7 units, 421-460 add 8 units, 461-500 add 9 units, > 501 add 8 units and call, Disp: , Rfl:   •  ipratropium (ATROVENT) 0.06 % nasal spray, 1 spray into each nostril 3 (three) times a day as needed, Disp: , Rfl:   •  Lactobacillus Acid-Pectin (Acidophilus/Pectin) CAPS, TAKE 1 CAPSULE ORALLY EVERY MORNING (DX:PROBIOTIC), Disp: , Rfl:   •  metoprolol succinate (TOPROL-XL) 50 mg 24 hr tablet, Take 50 mg by mouth daily, Disp: , Rfl:   •  omeprazole (PriLOSEC) 20 mg delayed release capsule, Take 20 mg by mouth daily, Disp: , Rfl:   •  aspirin (Aspirin 81) 81 mg chewable tablet, Chew 81 mg daily (Patient not taking: Reported on 5/6/2025), Disp: ,  Rfl:     Administrative Statements   Encounter provider Mariana Henao MD    The Patient is located at Home and in the following state in which I hold an active license [place].    The patient was identified by name and date of birth. Ivan Alcala was informed that this is a telemedicine visit and that the visit is being conducted through Telephone.  My office door was closed. No one else was in the room.  He acknowledged consent and understanding of privacy and security of the video platform. The patient has agreed to participate and understands they can discontinue the visit at any time.    I have spent a total time of 10 minutes in caring for this patient on the day of the visit/encounter including Patient and family education, not including the time spent for establishing the audio/video connection.     Mariana Henao MD

## (undated) DEVICE — CATH D 6F FR4 100CM

## (undated) DEVICE — GUIDEWIRE DIAGNOSTIC J .035. 150 (ORDER IN 5'S)

## (undated) DEVICE — TRANSDUCER DISP 12IN CABLE

## (undated) DEVICE — CATH 6FR PIG 145 ANGLE

## (undated) DEVICE — SYRINGE DISP LUER-LOK 50 CC

## (undated) DEVICE — GUIDEWIRE DIAGNOSTIC 035-260 EXCHANGE

## (undated) DEVICE — CATH BALLOON TRUE DILATATION 24MM X 4.5CM

## (undated) DEVICE — DRAPE C-ARM X-RAY EQUIPMENT IMAGE

## (undated) DEVICE — SET MICROPUNCTURE INTRO

## (undated) DEVICE — ***USE 121412***PACK DEVICE IMPLANT TLH

## (undated) DEVICE — SHEATH INTRODUCER PRELUDE IDEAL HYDROPHILIC SF 6F .021MM

## (undated) DEVICE — PAD DEFIB BIPHASIC HANDS FREE

## (undated) DEVICE — PACK MAJOR PROCEDURE TLH

## (undated) DEVICE — CATH BALLOON TRUE DILATATION 20MM X 4.5CM

## (undated) DEVICE — R-BAND RADIAL HEMOSTASIS 24CM

## (undated) DEVICE — Device

## (undated) DEVICE — DRAPE IOBAN EXTRA LARGE

## (undated) DEVICE — SHEATH ULTIMUM 6FR 23CM 10/BX

## (undated) DEVICE — BLADE SCALPEL #11

## (undated) DEVICE — LOADING SYS L-EVPROP2329US
Type: IMPLANTABLE DEVICE | Site: GROIN | Status: NON-FUNCTIONAL
Brand: EVOLUT™ PRO+
Removed: 2019-11-27

## (undated) DEVICE — DRAPE HALF STERILE

## (undated) DEVICE — GUIDEWIRE PLANTIUM PLUS ST 0.018 X 260CM

## (undated) DEVICE — APPLICATOR CHLORAPREP 10.5ML ORANGE TINT

## (undated) DEVICE — GLIDESHEATH SLENDER SS (.021) 6FR 10CM

## (undated) DEVICE — DRESSING TEGADERM 4X4 3/4

## (undated) DEVICE — BLADE SCALPEL #15

## (undated) DEVICE — CATH 5FR RIM

## (undated) DEVICE — CATH 6FR FL3.5

## (undated) DEVICE — STOPCOCK THREE WAY LUER LOK

## (undated) DEVICE — ADHESIVE SKIN DERMABOND ADVANCED 0.7ML

## (undated) DEVICE — KIT CATH LAB ANGIO

## (undated) DEVICE — SHEATH INTRODUCER PRELUDE IDEAL HYDROPHILIC SF 5F .021MM

## (undated) DEVICE — DRAPE 3/4 REINFORCED

## (undated) DEVICE — SHEATH 8 FR  WITH GUIDEWIRE

## (undated) DEVICE — CONTROLLER HAND AVANTA

## (undated) DEVICE — CATH GUIDING 6FR .070 IM VB-1

## (undated) DEVICE — KIT LEFT HEART TLH

## (undated) DEVICE — SET SINGLE PATIENT STERILE DISP 65"

## (undated) DEVICE — HOVERMATT 34IN FULL SINGLE PATI

## (undated) DEVICE — SUTURE SILK 0 K834H

## (undated) DEVICE — CHECK-FLO INTRODUCER 14FRX30 LONG

## (undated) DEVICE — SET ENDOVASCULAR DILATOR 16/18FR

## (undated) DEVICE — DELIVERY SYSTEM 23,26,29 MM VALVES
Type: IMPLANTABLE DEVICE | Site: GROIN | Status: NON-FUNCTIONAL
Removed: 2019-11-27

## (undated) DEVICE — COVER MAYO STAND

## (undated) DEVICE — GUIDEWIRE, BRECKER CURVE

## (undated) DEVICE — KIT RIGHT HEART LMC

## (undated) DEVICE — SUTURE BOOTS YELLOW STANDARD